# Patient Record
Sex: FEMALE | HISPANIC OR LATINO | Employment: FULL TIME | URBAN - METROPOLITAN AREA
[De-identification: names, ages, dates, MRNs, and addresses within clinical notes are randomized per-mention and may not be internally consistent; named-entity substitution may affect disease eponyms.]

---

## 2017-10-04 ENCOUNTER — TRANSFERRED RECORDS (OUTPATIENT)
Dept: HEALTH INFORMATION MANAGEMENT | Facility: CLINIC | Age: 32
End: 2017-10-04

## 2017-10-30 ENCOUNTER — TRANSFERRED RECORDS (OUTPATIENT)
Dept: HEALTH INFORMATION MANAGEMENT | Facility: CLINIC | Age: 32
End: 2017-10-30

## 2017-10-31 ENCOUNTER — TRANSFERRED RECORDS (OUTPATIENT)
Dept: HEALTH INFORMATION MANAGEMENT | Facility: CLINIC | Age: 32
End: 2017-10-31

## 2017-11-22 ENCOUNTER — PRENATAL OFFICE VISIT (OUTPATIENT)
Dept: OBGYN | Facility: CLINIC | Age: 32
End: 2017-11-22
Payer: COMMERCIAL

## 2017-11-22 ENCOUNTER — PRENATAL OFFICE VISIT (OUTPATIENT)
Dept: NURSING | Facility: CLINIC | Age: 32
End: 2017-11-22
Payer: COMMERCIAL

## 2017-11-22 VITALS
WEIGHT: 175.5 LBS | SYSTOLIC BLOOD PRESSURE: 122 MMHG | BODY MASS INDEX: 29.96 KG/M2 | HEART RATE: 62 BPM | HEIGHT: 64 IN | DIASTOLIC BLOOD PRESSURE: 72 MMHG | TEMPERATURE: 98 F

## 2017-11-22 DIAGNOSIS — N89.8 VAGINAL DISCHARGE: ICD-10-CM

## 2017-11-22 DIAGNOSIS — Z11.3 ROUTINE SCREENING FOR STI (SEXUALLY TRANSMITTED INFECTION): ICD-10-CM

## 2017-11-22 DIAGNOSIS — Z34.90 SUPERVISION OF NORMAL PREGNANCY: Primary | ICD-10-CM

## 2017-11-22 DIAGNOSIS — Z23 NEED FOR PROPHYLACTIC VACCINATION AND INOCULATION AGAINST INFLUENZA: ICD-10-CM

## 2017-11-22 DIAGNOSIS — Z33.3 PREGNANT STATE, GESTATIONAL CARRIER: Primary | ICD-10-CM

## 2017-11-22 LAB
ABO + RH BLD: NORMAL
ABO + RH BLD: NORMAL
ALBUMIN UR-MCNC: NEGATIVE MG/DL
APPEARANCE UR: CLEAR
BILIRUB UR QL STRIP: NEGATIVE
BLD GP AB SCN SERPL QL: NORMAL
BLOOD BANK CMNT PATIENT-IMP: NORMAL
COLOR UR AUTO: YELLOW
ERYTHROCYTE [DISTWIDTH] IN BLOOD BY AUTOMATED COUNT: 14.6 % (ref 10–15)
GLUCOSE UR STRIP-MCNC: NEGATIVE MG/DL
HCT VFR BLD AUTO: 37.7 % (ref 35–47)
HGB BLD-MCNC: 12.5 G/DL (ref 11.7–15.7)
HGB UR QL STRIP: NEGATIVE
KETONES UR STRIP-MCNC: NEGATIVE MG/DL
LEUKOCYTE ESTERASE UR QL STRIP: NEGATIVE
MCH RBC QN AUTO: 28.7 PG (ref 26.5–33)
MCHC RBC AUTO-ENTMCNC: 33.2 G/DL (ref 31.5–36.5)
MCV RBC AUTO: 87 FL (ref 78–100)
NITRATE UR QL: NEGATIVE
PH UR STRIP: 6.5 PH (ref 5–7)
PLATELET # BLD AUTO: 271 10E9/L (ref 150–450)
RBC # BLD AUTO: 4.35 10E12/L (ref 3.8–5.2)
SOURCE: NORMAL
SP GR UR STRIP: 1.02 (ref 1–1.03)
SPECIMEN EXP DATE BLD: NORMAL
SPECIMEN SOURCE: NORMAL
UROBILINOGEN UR STRIP-ACNC: 0.2 EU/DL (ref 0.2–1)
WBC # BLD AUTO: 7.9 10E9/L (ref 4–11)
WET PREP SPEC: NORMAL

## 2017-11-22 PROCEDURE — 99207 ZZC FIRST OB VISIT: CPT | Performed by: OBSTETRICS & GYNECOLOGY

## 2017-11-22 PROCEDURE — 86780 TREPONEMA PALLIDUM: CPT | Performed by: OBSTETRICS & GYNECOLOGY

## 2017-11-22 PROCEDURE — 90686 IIV4 VACC NO PRSV 0.5 ML IM: CPT | Performed by: OBSTETRICS & GYNECOLOGY

## 2017-11-22 PROCEDURE — 86900 BLOOD TYPING SEROLOGIC ABO: CPT | Performed by: OBSTETRICS & GYNECOLOGY

## 2017-11-22 PROCEDURE — 99207 ZZC NO CHARGE NURSE ONLY: CPT

## 2017-11-22 PROCEDURE — 87086 URINE CULTURE/COLONY COUNT: CPT | Performed by: OBSTETRICS & GYNECOLOGY

## 2017-11-22 PROCEDURE — 86901 BLOOD TYPING SEROLOGIC RH(D): CPT | Performed by: OBSTETRICS & GYNECOLOGY

## 2017-11-22 PROCEDURE — 87340 HEPATITIS B SURFACE AG IA: CPT | Performed by: OBSTETRICS & GYNECOLOGY

## 2017-11-22 PROCEDURE — 86850 RBC ANTIBODY SCREEN: CPT | Performed by: OBSTETRICS & GYNECOLOGY

## 2017-11-22 PROCEDURE — 87389 HIV-1 AG W/HIV-1&-2 AB AG IA: CPT | Performed by: OBSTETRICS & GYNECOLOGY

## 2017-11-22 PROCEDURE — 90471 IMMUNIZATION ADMIN: CPT | Performed by: OBSTETRICS & GYNECOLOGY

## 2017-11-22 PROCEDURE — 36415 COLL VENOUS BLD VENIPUNCTURE: CPT | Performed by: OBSTETRICS & GYNECOLOGY

## 2017-11-22 PROCEDURE — 87591 N.GONORRHOEAE DNA AMP PROB: CPT | Performed by: OBSTETRICS & GYNECOLOGY

## 2017-11-22 PROCEDURE — 87210 SMEAR WET MOUNT SALINE/INK: CPT | Performed by: OBSTETRICS & GYNECOLOGY

## 2017-11-22 PROCEDURE — 87491 CHLMYD TRACH DNA AMP PROBE: CPT | Performed by: OBSTETRICS & GYNECOLOGY

## 2017-11-22 PROCEDURE — 86762 RUBELLA ANTIBODY: CPT | Performed by: OBSTETRICS & GYNECOLOGY

## 2017-11-22 PROCEDURE — 81003 URINALYSIS AUTO W/O SCOPE: CPT | Performed by: OBSTETRICS & GYNECOLOGY

## 2017-11-22 PROCEDURE — 85027 COMPLETE CBC AUTOMATED: CPT | Performed by: OBSTETRICS & GYNECOLOGY

## 2017-11-22 RX ORDER — PRENATAL VIT/IRON FUM/FOLIC AC 27MG-0.8MG
1 TABLET ORAL DAILY
Status: ON HOLD | COMMUNITY
End: 2018-06-10

## 2017-11-22 NOTE — PROGRESS NOTES
OB - New OB History and Physical  Date of visit: 2017  Chief Complaint: To establish prenatal care    HPI: Ana Lilia Arrington is a 32 year old  at 11w0d as dated by frozen embryo transfer.  She is a gestational carrier for a couple in [place].  The egg is from the intended mother's sister who was in her early 30s at the time of egg retrieval.  Two frozen embryos were transferred, but only one has developed.    Since becoming pregnant, she has been doing well.  She's had some mild nausea, but no vomiting.  She has not had any cramping or bleeding since becoming pregnant.    Ultrasound: performed from [facility] prior to transfer to our clinic showed IUP with + FHT, and second gestational sac that did not develop.    Obstetric history:     Obstetric History       T3      L3     SAB0   TAB0   Ectopic0   Multiple0   Live Births3       # Outcome Date GA Lbr Mayank/2nd Weight Sex Delivery Anes PTL Lv   4 Current            3 Term 09/12/10 39w0d 06:00 8 lb 6 oz (3.799 kg) M    MICHELINE      Name: radha   2 Term 08 40w2d 03:00 7 lb (3.175 kg) F    MICHELINE      Name: Marcella   1 Term 06 40w0d 05:00 6 lb (2.722 kg) F    MICHELINE      Name: Estephanie        Patient denies history of gestational hypertension, pre-eclampsia, gestational diabetes,  labor.    Gynecologic History:   STI history: remote history of chlamydia  Last Pap: 2015  History of abnormal pap: none    Allergy: Eggs and No known drug allergies  Patient denies food, latex or environmental allergies except as above.    Current Medications:  Current Outpatient Prescriptions   Medication     PROGESTERONE MICRONIZED PO     Prenatal Vit-Fe Fumarate-FA (PRENATAL MULTIVITAMIN PLUS IRON) 27-0.8 MG TABS per tablet     No current facility-administered medications for this visit.        Past Medical History:  Past Medical History:   Diagnosis Date     NO ACTIVE PROBLEMS        Past Surgical History:  Past Surgical History:   Procedure  Laterality Date     NO HISTORY OF SURGERY         Social History:  Patient lives with  and children.  Patient's relationship status is: .    Denies current tobacco, alcohol or recreational drug use.  She feels safe in her relationship. Patient denies history of sexual, physical or mental abuse.     Family History:  Family History   Problem Relation Age of Onset     Family History Negative No family hx of        Review of Systems  Gen:  no change in weight, no fever, no chills, no fatigue  CV: no palpitations, no chest pain, no hypertension, no syncope  Resp: no shortness of breath, no cough, no wheezing, no asthma  GI: some nausea, no vomiting, no diarrhea, no constipation, no bloating, no GERD  :  no vaginal discharge, no dysuria, no abnormal bleeding, no pelvic pain   Endo: no thyroid problems, no cold/heat intolerance, no acne, no hirsutism, no diabetes  Heme: no easy bruising or bleeding, no history of DVT/PE/CVA  Neuro: no headaches, no seizures, no strokes, no focal deficits      Physical Exam:  There were no vitals filed for this visit.  There is no height or weight on file to calculate BMI.  Gen: alert, oriented, no distress, pleasant, appears stated age, well groomed  Neck: supple, trachea midline, no thyromegaly, no lymphadenopathy  HEENT: head normocephalic, atraumatic, normal oropharynx without erythema or exudates  CV: normal heart sounds, regular rate and rhythm, no murmurs  Resp: good inspiratory effort, lungs clear to ascultation bilaterally, no wheezes or rhonchi  Breast: no masses or nipple discharge  Abd: soft, nontender, nondistended, normoactive bowel sounds,  no masses or organomegaly, no hernias, no scars  : normal external genitalia with lesions or erythema; normal, well supported urethra, normal Bartholins, normal Skenes; normal pink rugated vaginal mucosa, no lesions or abnormal discharge; regular Nisreen speculum inserted without difficulty; normal appearing cervix  without lesions, bleeding or discharge. Bimanual exam shows 12week sized anteverted uterus, mobile, no fundal tenderness, no CMT, no adnexal masses or tenderness. Cervix WNL  Extr: warm, well perfused, nontender, no edema  Psych: affect bright, cooperative, responds appropriately      Assessment:  Ana Lilia Arrington is a 32 year old  at 11w0d presenting to establish prenatal care.    Problem List:   Gestational carrier  Nausea in early pregnancy    Plan:  1. Gestational carrier: intended parents in [place].  Patient will set up [employer] so she's able to share information/test results with intended parents.  2. Nausea in early pregnancy:  Taking B6 and unisom.  Rx sent for Zofran  3. Reviewed routine prenatal care. Discussed MD call schedule as well as role of residents and med students both in clinic and hospital.  She is okay  with resident care  4. Pap: UTD   5. Diet, Nutrition and Exercise:  Continue PNVs. Continue normal exercise. Her prepregnancy BMI is 28.  According to the WHO guidelines, patient is given a goal of gaining approximately 15-25 pounds during the course of her pregnancy.    6. Immunizations: plan TdaP at 28 weeks  7. Fetal anomaly screening: FTS scheduled, as it's in her gestational carrier contract  8. Routine Prenatal Care: the patient will return to clinic in 4 weeks and prn    Mraybel Monroe MD

## 2017-11-22 NOTE — PROGRESS NOTES

## 2017-11-22 NOTE — NURSING NOTE
"Chief Complaint   Patient presents with     Prenatal Care     new  teaching and labs       Initial /72  Pulse 62  Temp 98  F (36.7  C)  Ht 5' 4\" (1.626 m)  Wt 175 lb 8 oz (79.6 kg)  BMI 30.12 kg/m2 Estimated body mass index is 30.12 kg/(m^2) as calculated from the following:    Height as of this encounter: 5' 4\" (1.626 m).    Weight as of this encounter: 175 lb 8 oz (79.6 kg).  Medication Reconciliation: complete    "

## 2017-11-22 NOTE — PROGRESS NOTES
Patient presents for new ob teaching and labs, fourth pregnancy. This pregnancy she is a surrogate mother, [place] [facility] Last ultrasound was 10/30/17. Ordered first trimester screening, handouts reviewed and given. Complains of nausea, would like Rx. Has appointment today with Dr Monroe      Caffeine intake/servings daily - 0  Calcium intake/servings daily - 3  Exercise 5 times weekly - describe ; walks,   Sunscreen used - Yes  Seatbelts used - Yes  Guns stored in the home - No  Self Breast Exam - Yes  Pap test up to date -  Yes  Eye exam up to date -  Yes  Dental exam up to date -  No  DEXA scan up to date -  No  Flex Sig/Colonoscopy up to date -  No  Mammography up to date -  No  Immunizations reviewed and up to date - Yes  Abuse: Current or Past (Physical, Sexual or Emotional) - No  Do you feel safe in your environment - Yes  Do you cope well with stress - Yes  Do you suffer from insomnia - No    Prenatal OB Questionnaire  Past Medical History  Diabetes   No  Hypertension   No  Heart Disease, mitral valve prolapse, or rheumatic fever?   No  An autoimmune disorder such as Lupus or Rheumatoid Arthritis?   No  Kidney Disease or Urinary Tract Infection?   No  Epilepsy, seizures or spells?   No  Migraine headaches?   Yes  A stroke or loss of function or sensation?   No  Any other neurological problems?   No  Have you ever been treated for depression?  No  Are you having problems with crying spells or loss of self-esteem?   No  Have you ever required psychiatric care?   No  Have you ever hepatitis, liver disease or jaundice?   No  Have you ever been treated for blood clots in your veins, deep venous thrombosis, inflammation in the veins, thrombosis, phlebitis, pulmonary embolism or varicosities?   No  Have you had excessive bleeding after surgery or dental work?   No  Do you bleed more than other women after a cut or scratch?   No  Do you have a history of anemia?   No  Have you ever been treated  for thyroid problems or taken thyroid medication?  No  Do you have any other endocrine problems?  No  Have you ever been in a major accident or suffered serious trauma?   No  Within the last year, has anyone hit slapped, kicked or otherwise hurt you?  No  In the last year, has anyone forced you to have sex when you didn't want to?  No  Have you ever had a blood transfusion?   No  Would you refuse a blood transfusion if a doctor judged it to be medically necessary?   No  If you answered yes, would you rather die than have a blood transfusion?   No  If you answered yes, is this for Sabianist reasons?   No  Does anyone in your home smoke?   No  Do you use tobacco products?  No  Do you drink beer, wine, hard liquor?  No  Do you use any of the following: marijuana, speed, cocaine, heroine, hallucinogens, or other drugs?  No  Is your blood type Rh negative?   No  Have you ever had abnormal antibodies in your blood?   No  Have you ever had asthma?   No  Have you ever had tuberculosis?   No  Do you have any allergies to drugs or over-the-counter medications?   No    Allergies as of 11/22/2017:    Allergies as of 11/22/2017 - Nba as Reviewed 11/22/2017   Allergen Reaction Noted     Eggs  10/29/2010     No known drug allergies  08/06/2010       Do you have any breast problems?   No  Have you ever ?   No  Have you had any gynecological surgical procedures such as cervical conization, a LEEP procedure, laser treatment, cryosurgery of the cervix, or a dilation and curettage, etc?  No  Have you had any other surgical procedures?  No  Have you been hospitalized for a nonsurgical reason excluding normal delivery?   No  Have you ever had any anesthetic complications?   No  Have you ever had an abnormal pap smear?   No  Do you have a history of abnormalities of the uterus?   No  Did it take you more than one year to become pregnant?   No  Have you ever been evaluated or treated for infertility?   No  Is there a history of  medical problems in your family, which you feel might adversely affect your health or pregnancy?   No  Do you have any other problems we have not asked you about which you feel may be important to this pregnancy?  No    Symptoms since Last Menstrual Period  Do you have any of the following:    *abdominal pain  No  *blood in stool or urine  No  *chest pain  No  *shortness of breath  No  *coughing or vomiting up blood No  *heart racing or skipping beats  No  *nausea and vomiting  No  *pain with urination  No  *vaginal discharge or bleeding  No  Current medications are:  Current Outpatient Prescriptions   Medication Sig Dispense Refill     PROGESTERONE MICRONIZED PO Take 200 mg by mouth daily       Prenatal Vit-Fe Fumarate-FA (PRENATAL MULTIVITAMIN PLUS IRON) 27-0.8 MG TABS per tablet Take 1 tablet by mouth daily         Genetic Screening  At the time of birth, will you be 35 years old or older?  No  Has the patient, baby s father, or anyone in either family had:  Thalassemia (Italian, Greek, Mediterranean, or  background only) and an MCV result less than 80?  No  Neural tube defect such as meningomyelocele, spina bifida or anencephaly?  No  Congenital heart defect?  No  Down s syndrome?  No  Nicho-Sach s disease (Orthodox, Cajun, Uruguayan-[place])?  No  Sickle cell disease or trait (Shayy)?  No  Hemophilia or other inherited problems of blood coagulation? No  Muscular dystrophy?  No  Cystic Fibrosis?  No  Richland s chorea?  No  Mental retardation/autism? No   If yes, was the person tested for fragile X?  No  Any other inherited genetic or chromosomal disorder?  No  Maternal metabolic disorder (e.g. insulin-dependent diabetes, PKU)? No  A child with birth defects not listed above?  No  Recurrent pregnancy loss or a stillbirth?  No  Has the patient had any medications/street drugs/alcohol since her last menstrual period? No  Does the patient or baby s father have any other genetic risks?  No  Infection History  Do  you object to being tested for Hepatitis B? No  Do you object to being tested for HIV? No  Do you feel that you are at high risk for coming in contact with the AIDS virus?  No  Have you ever been treated for tuberculosis?  No  Have you ever received the BCG vaccine for tuberculosis?  No  Have you ever had a positive skin test for tuberculosis? No  Do you live with someone who has tuberculosis?  No  Have you ever been exposed to tuberculosis?  No  Do you have genital herpes?  No  Does your partner have genital herpes?  No  Have you had a rash or viral illness since your last period?  No  Have you ever had Gonorrhea, Chlamydia, Syphilis, venereal warts, trichomoniasis, pelvic inflammatory disease or any other sexually transmitted disease?  Yes  Do you know if you are a genital group B streptococcus carrier? No  You have not had chicken pox/varicella  Yes  Have you been vaccinated against chicken pox?  Yes  Have you had any other infectious disease? No        Early ultrasound screening tool:    Does patient have irregular periods?  No  Did patient use hormonal birth control in the three months prior to positive urine pregnancy test? No  Is the patient breastfeeding?  No  Is the patient 10 weeks or greater at time of education visit?  Yes

## 2017-11-22 NOTE — MR AVS SNAPSHOT
After Visit Summary   11/22/2017    Ana Lilia Arrington    MRN: [id number]           Patient Information     Date Of Birth          1985        Visit Information        Provider Department      11/22/2017 1:45 PM RD OB NURSE EDUCATION [facility]        Today's Diagnoses     Supervision of normal pregnancy    -  1       Follow-ups after your visit        Additional Services     MAT FETAL MED CTR REFERRAL-PREGNANCY       >> Patient may proceed with recommendations for further testing as directed by the Maternal Fetal Medicine Specialist >>    >> If requesting Fetal Echo: MFM will determine appropriate location for exam due to indication.    >> If requesting Lung Maturity Amnio:  If results indicate fetal lung maturity, induction or C/S is recommended within 36 hours.  Please schedule accordingly.     Dear Patient:   Please be aware that coverage of these services is subject to the terms and limitations of your health insurance plan.  Call member services at your health plan with any benefit or coverage questions.      Please bring the following to your appointment:    >>  Any x-rays, CTs or MRIs which have been performed.  Contact the facility where they were done to arrange for  prior to your scheduled appointment.  Any new CT, MRI or other procedures ordered by your specialist must be performed at a [facility] facility or coordinated by your clinic's referral office.  >>  List of current medications   >>  This referral request   >>  Any documents/labs given to you for this referral                  Your next 10 appointments already scheduled     Nov 22, 2017  2:30 PM CST   New Prenatal with Marybel Monroe MD   [facility]    [address] [place] [id number] [phone]              Who to contact     If you have questions or need follow up information about today's clinic visit or your schedule please  "contact [facility] directly at [phone].  Normal or non-critical lab and imaging results will be communicated to you by MyChart, letter or phone within 4 business days after the clinic has received the results. If you do not hear from us within 7 days, please contact the clinic through MyChart or phone. If you have a critical or abnormal lab result, we will notify you by phone as soon as possible.  Submit refill requests through [employer] or call your pharmacy and they will forward the refill request to us. Please allow 3 business days for your refill to be completed.          Additional Information About Your Visit        [facility] Information     [employer] lets you send messages to your doctor, view your test results, renew your prescriptions, schedule appointments and more. To sign up, go to [email or web address] . Click on \"Log in\" on the left side of the screen, which will take you to the Welcome page. Then click on \"Sign up Now\" on the right side of the page.     You will be asked to enter the access code listed below, as well as some personal information. Please follow the directions to create your username and password.     Your access code is: [email or web address]  Expires: 2018  2:21 PM     Your access code will  in 90 days. If you need help or a new code, please call your [facility] clinic or [phone].        Care EveryWhere ID     This is your Care EveryWhere ID. This could be used by other organizations to access your [facility] medical records  [id number]        Your Vitals Were     Pulse Temperature Height BMI (Body Mass Index)          62 98  F (36.7  C) 5' 4\" (1.626 m) 30.12 kg/m2         Blood Pressure from Last 3 Encounters:   17 122/72   16 133/74   11/16/15 134/70    Weight from Last 3 Encounters:   17 175 lb 8 oz (79.6 kg)   16 137 lb 6.4 oz (62.3 kg)   11/16/15 127 lb 3.2 oz (57.7 kg)              We Performed the Following     ABO/RH Type and " Screen     Anti Treponema     CBC with Platelets     Hepatitis B surface antigen     HIV Antigen Antibody Combo     MAT FETAL MED CTR REFERRAL-PREGNANCY     Rubella Antibody IgG Quantitative     UA without Microscopic     Urine Culture Aerobic Bacterial        Primary Care Provider Office Phone # Fax SONU Reece [phone]       [address]  [facility] [address]        Equal Access to Services     [facility] Hospital[facility] : Hadii aad ku hadasho Soomaali, waaxda luqadaha, qaybta kaalmada adeegyada, waxay idiin hayaan adeeg kharash la'aan . So [facility] [phone].    ATENCIÓN: Si habla español, tiene a alberto disposición servicios gratuitos de asistencia lingüística. Ernesto al [phone].    We comply with applicable federal civil rights laws and [place] laws. We do not discriminate on the basis of race, color, national origin, age, disability, sex, sexual orientation, or gender identity.            Thank you!     Thank you for choosing [facility]  for your care. Our goal is always to provide you with excellent care. Hearing back from our patients is one way we can continue to improve our services. Please take a few minutes to complete the written survey that you may receive in the mail after your visit with us. Thank you!             Your Updated Medication List - Protect others around you: Learn how to safely use, store and throw away your medicines at www.disposemymeds.org.          This list is accurate as of: 11/22/17  2:21 PM.  Always use your most recent med list.                   Brand Name Dispense Instructions for use Diagnosis    prenatal multivitamin plus iron 27-0.8 MG Tabs per tablet      Take 1 tablet by mouth daily        PROGESTERONE MICRONIZED PO      Take 200 mg by mouth daily

## 2017-11-22 NOTE — MR AVS SNAPSHOT
After Visit Summary   11/22/2017    Ana Lilia Arrington    MRN: [id number]           Patient Information     Date Of Birth          1985        Visit Information        Provider Department      11/22/2017 2:30 PM Marybel Monroe MD [facility]        Today's Diagnoses     Pregnant state, gestational carrier    -  1    Need for prophylactic vaccination and inoculation against influenza        Vaginal discharge        Routine screening for STI (sexually transmitted infection)           Follow-ups after your visit        Additional Services     MAT FETAL MED CTR REFERRAL-PREGNANCY       >> Patient may proceed with recommendations for further testing as directed by the Maternal Fetal Medicine Specialist >>    >> If requesting Fetal Echo: MFM will determine appropriate location for exam due to indication.    >> If requesting Lung Maturity Amnio:  If results indicate fetal lung maturity, induction or C/S is recommended within 36 hours.  Please schedule accordingly.     Dear Patient:   Please be aware that coverage of these services is subject to the terms and limitations of your health insurance plan.  Call member services at your health plan with any benefit or coverage questions.      Please bring the following to your appointment:    >>  Any x-rays, CTs or MRIs which have been performed.  Contact the facility where they were done to arrange for  prior to your scheduled appointment.  Any new CT, MRI or other procedures ordered by your specialist must be performed at a [place] facility or coordinated by your clinic's referral office.  >>  List of current medications   >>  This referral request   >>  Any documents/labs given to you for this referral                  Your next 10 appointments already scheduled     Nov 30, 2017  2:15 PM CST   Genetic Counseling with SH GEN COUNSELOR 1   [facility] Maternal Fetal Medicine [facility] ([facility])    [address] [facility]  "[address] [address]   [phone]            Nov 30, 2017  3:00 PM CST   MFM NUCHAL TRANSLUCENCY with SHMFMUSR3   [facility] Maternal Fetal Medicine Ultrasound - [facility] ([facility])    [address] [place] [address] [address]  [facility] [address]   [phone]            Nov 30, 2017  3:30 PM CST   Radiology MD with SH MFM MD   [facility] Maternal Fetal Medicine [facility] ([facility])    [address] [place] [address] [address]  [facility] [address]   [phone]           Please arrive at the time given for your first appointment. This visit is used internally to schedule the physician's time during your ultrasound.            Dec 20, 2017  1:30 PM CST   ESTABLISHED PRENATAL with Marybel Monroe MD   [facility]    [address] [address]  [facility] [address] [phone]              Who to contact     If you have questions or need follow up information about today's clinic visit or your schedule please contact [facility] directly at [phone].  Normal or non-critical lab and imaging results will be communicated to you by MyChart, letter or phone within 4 business days after the clinic has received the results. If you do not hear from us within 7 days, please contact the clinic through Local Motors[employer] or phone. If you have a critical or abnormal lab result, we will notify you by phone as soon as possible.  Submit refill requests through Yodle or call your pharmacy and they will forward the refill request to us. Please allow 3 business days for your refill to be completed.          Additional Information About Your Visit        Yodle Information     Yodle lets you send messages to your doctor, view your test results, renew your prescriptions, schedule appointments and more. To sign up, go to [email or web address] . Click on \"Log in\" on the left side of the screen, which will take you to " "the Welcome page. Then click on \"Sign up Now\" on the right side of the page.     You will be asked to enter the access code listed below, as well as some personal information. Please follow the directions to create your username and password.     Your access code is: [email or web address]  Expires: 2018  3:45 PM     Your access code will  in 90 days. If you need help or a new code, please call your [place] clinic or [phone].        Care EveryWhere ID     This is your Care EveryWhere ID. This could be used by other organizations to access your [place] medical records  [id number]         Blood Pressure from Last 3 Encounters:   17 122/72   16 133/74   11/16/15 134/70    Weight from Last 3 Encounters:   17 175 lb 8 oz (79.6 kg)   16 137 lb 6.4 oz (62.3 kg)   11/16/15 127 lb 3.2 oz (57.7 kg)              We Performed the Following     CHLAMYDIA TRACHOMATIS PCR     FLU VAC, SPLIT VIRUS IM > 3 YO (QUADRIVALENT) [67757]     MAT FETAL MED CTR REFERRAL-PREGNANCY     NEISSERIA GONORRHOEA PCR     Vaccine Administration, Initial [13041]     Wet prep        Primary Care Provider Office Phone # Fax #    SONU Ortiz [facility] [phone]       [address]  [facility] [address]        Equal Access to Services     MISHA ALAN : Hadii aad ku hadasho Soomaali, waaxda luqadaha, qaybta kaalmada adeegyada, duane hewitt . So [facility] [phone].    ATENCIÓN: Si habla español, tiene a alberto disposición servicios gratuitos de asistencia lingüística. Cadename al [phone].    We comply with applicable federal civil rights laws and [place] laws. We do not discriminate on the basis of race, color, national origin, age, disability, sex, sexual orientation, or gender identity.            Thank you!     Thank you for choosing [facility]  for your care. Our goal is always to provide you with excellent care. Hearing back from our patients is " one way we can continue to improve our services. Please take a few minutes to complete the written survey that you may receive in the mail after your visit with us. Thank you!             Your Updated Medication List - Protect others around you: Learn how to safely use, store and throw away your medicines at www.disposemymeds.org.          This list is accurate as of: 11/22/17 11:59 PM.  Always use your most recent med list.                   Brand Name Dispense Instructions for use Diagnosis    prenatal multivitamin plus iron 27-0.8 MG Tabs per tablet      Take 1 tablet by mouth daily        PROGESTERONE MICRONIZED PO      Take 200 mg by mouth daily

## 2017-11-23 LAB
BACTERIA SPEC CULT: NORMAL
SPECIMEN SOURCE: NORMAL

## 2017-11-24 LAB
C TRACH DNA SPEC QL NAA+PROBE: NEGATIVE
HBV SURFACE AG SERPL QL IA: NONREACTIVE
HIV 1+2 AB+HIV1 P24 AG SERPL QL IA: NONREACTIVE
N GONORRHOEA DNA SPEC QL NAA+PROBE: NEGATIVE
RUBV IGG SERPL IA-ACNC: 199 IU/ML
SPECIMEN SOURCE: NORMAL
SPECIMEN SOURCE: NORMAL
T PALLIDUM IGG+IGM SER QL: NEGATIVE

## 2017-11-27 ENCOUNTER — PRE VISIT (OUTPATIENT)
Dept: MATERNAL FETAL MEDICINE | Facility: CLINIC | Age: 32
End: 2017-11-27

## 2017-11-27 PROBLEM — Z33.3 PREGNANT STATE, GESTATIONAL CARRIER: Status: ACTIVE | Noted: 2017-11-27

## 2017-11-30 ENCOUNTER — HOSPITAL ENCOUNTER (OUTPATIENT)
Dept: LAB | Facility: CLINIC | Age: 32
End: 2017-11-30
Attending: OBSTETRICS & GYNECOLOGY
Payer: COMMERCIAL

## 2017-11-30 ENCOUNTER — OFFICE VISIT (OUTPATIENT)
Dept: MATERNAL FETAL MEDICINE | Facility: CLINIC | Age: 32
End: 2017-11-30
Attending: OBSTETRICS & GYNECOLOGY
Payer: COMMERCIAL

## 2017-11-30 ENCOUNTER — HOSPITAL ENCOUNTER (OUTPATIENT)
Dept: ULTRASOUND IMAGING | Facility: CLINIC | Age: 32
Discharge: HOME | End: 2017-11-30
Attending: OBSTETRICS & GYNECOLOGY | Admitting: OBSTETRICS & GYNECOLOGY
Payer: COMMERCIAL

## 2017-11-30 DIAGNOSIS — Z36.9 FIRST TRIMESTER SCREENING: ICD-10-CM

## 2017-11-30 DIAGNOSIS — O09.811 PREGNANCY RESULTING FROM ASSISTED REPRODUCTIVE TECHNOLOGY IN FIRST TRIMESTER: Primary | ICD-10-CM

## 2017-11-30 DIAGNOSIS — Z36.9 FIRST TRIMESTER SCREENING: Primary | ICD-10-CM

## 2017-11-30 DIAGNOSIS — Z36.82 ENCOUNTER FOR (NT) NUCHAL TRANSLUCENCY SCAN: ICD-10-CM

## 2017-11-30 DIAGNOSIS — O26.90 PREGNANCY RELATED CONDITION, ANTEPARTUM: ICD-10-CM

## 2017-11-30 PROCEDURE — 84704 HCG FREE BETACHAIN TEST: CPT | Performed by: OBSTETRICS & GYNECOLOGY

## 2017-11-30 PROCEDURE — 76813 OB US NUCHAL MEAS 1 GEST: CPT

## 2017-11-30 PROCEDURE — 96040 ZZH GENETIC COUNSELING, EACH 30 MINUTES: CPT | Mod: ZF | Performed by: GENETIC COUNSELOR, MS

## 2017-11-30 PROCEDURE — 84163 PAPPA SERUM: CPT | Performed by: OBSTETRICS & GYNECOLOGY

## 2017-11-30 PROCEDURE — 36415 COLL VENOUS BLD VENIPUNCTURE: CPT | Performed by: OBSTETRICS & GYNECOLOGY

## 2017-11-30 NOTE — MR AVS SNAPSHOT
After Visit Summary   11/30/2017    Ana Lilia Arrington    MRN: [id number]           Patient Information     Date Of Birth          1985        Visit Information        Provider Department      11/30/2017 3:30 PM Pat Marshall DO [facility] Maternal Fetal Medicine [facility]        Today's Diagnoses     Pregnancy resulting from assisted reproductive technology in first trimester    -  1    Encounter for (NT) nuchal translucency scan           Follow-ups after your visit        Your next 10 appointments already scheduled     Dec 20, 2017  1:30 PM CST   ESTABLISHED PRENATAL with Marybel Monroe MD   [facility] ([facility])    [address]  [address]  [facility] [address]   [phone]            Jan 12, 2018 10:15 AM CST   (Arrive by 10:00 AM)   MFM US COMP with URMFMUSR3   [facility] Maternal Fetal Medicine Ultrasound - [facility])    [address]  [facility] [id number] [phone]           Wear comfortable clothes and leave your valuables at home.            Jan 12, 2018 10:45 AM CST   Radiology MD with UR MARGARET WALTON   [facility] Maternal Fetal Medicine - [facility])    [address]  [facility] [address]   [phone]           Please arrive at the time given for your first appointment. This visit is used internally to schedule the physician's time during your ultrasound.              Future tests that were ordered for you today     Open Future Orders        Priority Expected Expires Ordered    MFM US Comprehensive Single Routine 1/11/2018 9/30/2018 11/30/2017    First Trimester Screen Biochem Markers Routine  11/30/2018 11/30/2017            Who to contact     If you have questions or need follow up information about today's clinic visit or your schedule please contact [facility] MATERNAL FETAL MEDICINE [facility] directly at  [phone].  Normal or non-critical lab and imaging results will be communicated to you by MyChart, letter or phone within 4 business days after the clinic has received the results. If you do not hear from us within 7 days, please contact the clinic through MEDOPhart or phone. If you have a critical or abnormal lab result, we will notify you by phone as soon as possible.  Submit refill requests through BotanoCap or call your pharmacy and they will forward the refill request to us. Please allow 3 business days for your refill to be completed.          Additional Information About Your Visit        MEDOPhart Information     BotanoCap gives you secure access to your electronic health record. If you see a primary care provider, you can also send messages to your care team and make appointments. If you have questions, please call your primary care clinic.  If you do not have a primary care provider, please call [phone] and they will assist you.        Care EveryWhere ID     This is your Care EveryWhere ID. This could be used by other organizations to access your [place] medical records  [id number]         Blood Pressure from Last 3 Encounters:   11/22/17 122/72   04/13/16 133/74   11/16/15 134/70    Weight from Last 3 Encounters:   11/22/17 79.6 kg (175 lb 8 oz)   04/13/16 62.3 kg (137 lb 6.4 oz)   11/16/15 57.7 kg (127 lb 3.2 oz)               Primary Care Provider Office Phone # Fax #    Gill SONU Perez [facility] [phone]       [address] [facility] [address]  [facility] [address]        Equal Access to Services     [facility]: Hadii aad ku hadasho Soomaali, waaxda luqadaha, qaybta kaalmada adeegyanellie, duane hewitt . So [facility] [phone].    ATENCIÓN: Si habla español, tiene a alberto disposición servicios gratuitos de asistencia lingüística. Llame al [phone].    We comply with applicable federal civil rights laws and [place] laws. We do not discriminate on the basis of  race, color, national origin, age, disability, sex, sexual orientation, or gender identity.            Thank you!     Thank you for choosing [facility]  for your care. Our goal is always to provide you with excellent care. Hearing back from our patients is one way we can continue to improve our services. Please take a few minutes to complete the written survey that you may receive in the mail after your visit with us. Thank you!             Your Updated Medication List - Protect others around you: Learn how to safely use, store and throw away your medicines at www.disposemymeds.org.          This list is accurate as of: 11/30/17 11:59 PM.  Always use your most recent med list.                   Brand Name Dispense Instructions for use Diagnosis    prenatal multivitamin plus iron 27-0.8 MG Tabs per tablet      Take 1 tablet by mouth daily        PROGESTERONE MICRONIZED PO      Take 200 mg by mouth daily

## 2017-11-30 NOTE — PROGRESS NOTES
[facility] [place]  Genetic Counseling Consult    Patient: Ana Lilia Arrington YOB: 1985   Date of Service: 17      Ana Lilia Arrington was seen at [facility] for genetic consultation to discuss the options for routine screening for fetal chromosome abnormalities.       Impression/Plan:   1.  Ana Lilia had an ultrasound and blood draw for first trimester screening.  Results are expected within 3-5 days, and will be available in [facility].  We will contact her to discuss the results, and a copy will be forwarded to the office of the referring OB provider.    2. Maternal serum AFP (single marker screen) is recommended after 15 weeks to screen for open neural tube defects. A quad screen should not be performed.    Pregnancy History:   /Parity:    Age at Delivery: 33 year old  SANDY: 2018, by Ultrasound  Gestational Age: 12w1d    No significant complications or exposures were reported in the current pregnancy.    As you know, Ana Lilia is a gestational carrier for a couple in [place]. The egg was donated by the intended mother's sister. Per Ana Lilia,the donor was 27 years old at the time of retrieval The intended father is the sperm donor. Ana Lilia reported that this was a frozen embryo transfer on 2017. Two embryos were transferred and a osborn pregnancy was diagnosed by early ultrasound.       Family History:   No family history was available for the intended father or the egg donor.          Risk Assessment for Chromosome Conditions:   We explained that the risk for fetal chromosome abnormalities increases with maternal age. We discussed specific features of common chromosome abnormalities, including Down syndrome, trisomy 13, trisomy 18, and sex chromosome trisomies.      At age 27 (age of the egg donor), the risk to have a baby with Down syndrome is 1 in 1111.     At age 27 (age of the egg donor), the risk to have a baby with any  chromosome abnormality is 1 in 455.          Testing Options:   We discussed the following options:   First trimester screening    First trimester ultrasound with nuchal translucency and nasal bone assessments, maternal plasma hCG, JULIETA-A, and AFP measurement    Screens for fetal trisomy 21, trisomy 13, and trisomy 18    Cannot screen for open neural tube defects; maternal serum AFP after 15 weeks is recommended     Non-invasive Prenatal Testing (NIPT)    Maternal plasma cell-free DNA testing; first trimester ultrasound with nuchal translucency and nasal bone assessment is recommended, when appropriate    Screens for fetal trisomy 21, trisomy 13, trisomy 18, and sex chromosome aneuploidy    Cannot screen for open neural tube defects; maternal serum AFP after 15 weeks is recommended     Genetic Amniocentesis    Invasive procedure typically performed in the second trimester by which amniotic fluid is obtained for the purpose of chromosome analysis and/or other prenatal genetic analysis    Diagnostic results; >99% sensitivity for fetal chromosome abnormalities    AFAFP measurement tests for open neural tube defects     Comprehensive (Level II) ultrasound: Detailed ultrasound performed between 18-22 weeks gestation to screen for major birth defects and markers for aneuploidy.      We reviewed the benefits and limitations of this testing.  Screening tests provide a risk assessment specific to the pregnancy for certain fetal chromosome abnormalities, but cannot definitively diagnose or exclude a fetal chromosome abnormality.  Follow-up genetic counseling and consideration of diagnostic testing is recommended with any abnormal screening result.      It was a pleasure to be involved with Iris s care. Face-to-face time of the meeting was 25 minutes.    Melinda Sutton MS, [facility]  Maternal Fetal Medicine  [facility]  Phone:[phone]  Email: [email or web address]

## 2017-11-30 NOTE — MR AVS SNAPSHOT
After Visit Summary   11/30/2017    Ana Lilia Arrington    MRN: [id number]           Patient Information     Date Of Birth          1985        Visit Information        Provider Department      11/30/2017 2:15 PM Melinda Sutton GC [facility] Maternal Fetal Medicine  [place]        Today's Diagnoses     First trimester screening    -  1    Pregnancy related condition, antepartum           Follow-ups after your visit        Your next 10 appointments already scheduled     Dec 20, 2017  1:30 PM CST   ESTABLISHED PRENATAL with Marybel Monroe MD   [facility] ([facility])    [address]  [address]  [facility] [address]   [phone]            Jan 12, 2018 10:15 AM CST   (Arrive by 10:00 AM)   MFM US COMP with [id number]   [facility] Maternal Fetal Medicine Ultrasound - [facility])    [address]  [facility] [address] [phone]           Wear comfortable clothes and leave your valuables at home.            Jan 12, 2018 10:45 AM CST   Radiology MD with UR MARGARET WALTON   [facility] Maternal Fetal Medicine - [facility])    [address]  [facility] [address]   [phone]           Please arrive at the time given for your first appointment. This visit is used internally to schedule the physician's time during your ultrasound.              Future tests that were ordered for you today     Open Future Orders        Priority Expected Expires Ordered    MFM US Comprehensive Single Routine 1/11/2018 9/30/2018 11/30/2017    First Trimester Screen Biochem Markers Routine  11/30/2018 11/30/2017            Who to contact     If you have questions or need follow up information about today's clinic visit or your schedule please contact [facility] MATERNAL FETAL MEDICINE [facility] directly at [phone].  Normal or non-critical lab and imaging results will be  communicated to you by Sierra Photonicshart, letter or phone within 4 business days after the clinic has received the results. If you do not hear from us within 7 days, please contact the clinic through APR E[employer] or phone. If you have a critical or abnormal lab result, we will notify you by phone as soon as possible.  Submit refill requests through dotSyntax or call your pharmacy and they will forward the refill request to us. Please allow 3 business days for your refill to be completed.          Additional Information About Your Visit        [employer] Information     dotSyntax gives you secure access to your electronic health record. If you see a primary care provider, you can also send messages to your care team and make appointments. If you have questions, please call your primary care clinic.  If you do not have a primary care provider, please call [phone] and they will assist you.        Care EveryWhere ID     This is your Care EveryWhere ID. This could be used by other organizations to access your [place] medical records  [id number]         Blood Pressure from Last 3 Encounters:   11/22/17 122/72   04/13/16 133/74   11/16/15 134/70    Weight from Last 3 Encounters:   11/22/17 79.6 kg (175 lb 8 oz)   04/13/16 62.3 kg (137 lb 6.4 oz)   11/16/15 57.7 kg (127 lb 3.2 oz)              We Performed the Following     [facility] Genetic Counseling        Primary Care Provider Office Phone # Fax #    Gill SONU Perez [facility] [phone]       [address] [facility] [address]  [facility] [address]        Equal Access to Services     MISHA ALAN : Hadii aad ku hadasho Soomaali, waaxda luqadaha, qaybta kaalmada adeegyada, duane hewitt . So [facility] [phone].    ATENCIÓN: Si habla español, tiene a alberto disposición servicios gratuitos de asistencia lingüística. Llame al [phone].    We comply with applicable federal civil rights laws and [place] laws. We do not discriminate on the basis of race, color,  national origin, age, disability, sex, sexual orientation, or gender identity.            Thank you!     Thank you for choosing [facility]  for your care. Our goal is always to provide you with excellent care. Hearing back from our patients is one way we can continue to improve our services. Please take a few minutes to complete the written survey that you may receive in the mail after your visit with us. Thank you!             Your Updated Medication List - Protect others around you: Learn how to safely use, store and throw away your medicines at www.disposemymeds.org.          This list is accurate as of: 11/30/17  4:34 PM.  Always use your most recent med list.                   Brand Name Dispense Instructions for use Diagnosis    prenatal multivitamin plus iron 27-0.8 MG Tabs per tablet      Take 1 tablet by mouth daily        PROGESTERONE MICRONIZED PO      Take 200 mg by mouth daily

## 2017-12-01 NOTE — PROGRESS NOTES
"Please see \"Imaging\" tab under \"Chart Review\" for details of today's US.    Pat Marshall, DO    "

## 2017-12-04 ENCOUNTER — TELEPHONE (OUTPATIENT)
Dept: MATERNAL FETAL MEDICINE | Facility: CLINIC | Age: 32
End: 2017-12-04

## 2017-12-04 NOTE — TELEPHONE ENCOUNTER
Called Iris and discussed normal first trimester screen resutls. These results indicated a 1 in >10,000 risk for Down syndrome and a 1 in >10,000 risk for trisomy 18/13. This screening test gives information about the risk of some chromosome conditions in a pregnancy, but does not definitively diagnose or exclude the presence of these chromosome conditions.  A copy of these results are available in her EPIC chart for her primary OB to review. Maternal serum AFP only is recommended in the second trimester to screen for neural tube defects.    Melinda Sutton MS, [facility]  Maternal Fetal Medicine  [phone]

## 2017-12-05 LAB — LAB SCANNED RESULT: NORMAL

## 2017-12-20 ENCOUNTER — PRENATAL OFFICE VISIT (OUTPATIENT)
Dept: OBGYN | Facility: CLINIC | Age: 32
End: 2017-12-20
Payer: COMMERCIAL

## 2017-12-20 VITALS
HEART RATE: 65 BPM | WEIGHT: 178 LBS | DIASTOLIC BLOOD PRESSURE: 65 MMHG | TEMPERATURE: 97.1 F | BODY MASS INDEX: 30.55 KG/M2 | SYSTOLIC BLOOD PRESSURE: 115 MMHG

## 2017-12-20 DIAGNOSIS — Z33.3 PREGNANT STATE, GESTATIONAL CARRIER: Primary | ICD-10-CM

## 2017-12-20 PROCEDURE — 99207 ZZC PRENATAL VISIT: CPT | Performed by: OBSTETRICS & GYNECOLOGY

## 2017-12-20 NOTE — MR AVS SNAPSHOT
After Visit Summary   12/20/2017    Ana Lilia Arrington    MRN: [id number]           Patient Information     Date Of Birth          1985        Visit Information        Provider Department      12/20/2017 1:30 PM Marybel Monroe MD [facility]        Today's Diagnoses     Pregnant state, gestational carrier    -  1       Follow-ups after your visit        Your next 10 appointments already scheduled     Jan 12, 2018 10:15 AM CST   (Arrive by 10:00 AM)   MARGARET US COMP with URMFMUSR3   [facility] - [facility])    [address]  [facility] [id number] [phone]           Wear comfortable clothes and leave your valuables at home.            Jan 12, 2018 10:45 AM CST   Radiology MD with UR MARGARET WALTON   [facility] - [facility])    [address]  [facility] [address] [phone]           Please arrive at the time given for your first appointment. This visit is used internally to schedule the physician's time during your ultrasound.            Jan 12, 2018 11:30 AM CST   LAB with RD LAB   [facility] ([facility])    [address] [place] [address]  [facility] [address] [phone]           Please do not eat 10-12 hours before your appointment if you are coming in fasting for labs on lipids, cholesterol, or glucose (sugar). This does not apply to pregnant women. Water, hot tea and black coffee (with nothing added) are okay. Do not drink other fluids, diet soda or chew gum.            Jan 19, 2018  1:30 PM CST   ESTABLISHED PRENATAL with Marybel Monroe MD   [facility] ([facility])    [address] [place] [address]  [facility] [id number] [phone]              Future tests that were ordered for you today     Open Future Orders        Priority  Expected Expires Ordered    Alpha fetoprotein maternal screen Routine 1/12/2018 12/20/2018 12/20/2017            Who to contact     If you have questions or need follow up information about today's clinic visit or your schedule please contact [facility] directly at [phone].  Normal or non-critical lab and imaging results will be communicated to you by MyChart, letter or phone within 4 business days after the clinic has received the results. If you do not hear from us within 7 days, please contact the clinic through [employer] or phone. If you have a critical or abnormal lab result, we will notify you by phone as soon as possible.  Submit refill requests through Rypos or call your pharmacy and they will forward the refill request to us. Please allow 3 business days for your refill to be completed.          Additional Information About Your Visit        MyChart Information     Rypos gives you secure access to your electronic health record. If you see a primary care provider, you can also send messages to your care team and make appointments. If you have questions, please call your primary care clinic.  If you do not have a primary care provider, please call [phone] and they will assist you.        Care EveryWhere ID     This is your Care EveryWhere ID. This could be used by other organizations to access your [place] medical records  [id number]        Your Vitals Were     Pulse Temperature BMI (Body Mass Index)             65 97.1  F (36.2  C) (Oral) 30.55 kg/m2          Blood Pressure from Last 3 Encounters:   12/20/17 115/65   11/22/17 122/72   04/13/16 133/74    Weight from Last 3 Encounters:   12/20/17 178 lb (80.7 kg)   11/22/17 175 lb 8 oz (79.6 kg)   04/13/16 137 lb 6.4 oz (62.3 kg)               Primary Care Provider Office Phone # Fax #    SONU Ortiz [facility] [phone]       [address] [address]        Equal Access to Services      MISHA ALAN : Hadii aad ku natalie Worrell, watressada luqadaha, qaybta kaalmada kwakustivennellie, duane gardnergregkayla munoz. So [facility] [phone].    ATENCIÓN: Si habla español, tiene a alberto disposición servicios gratuitos de asistencia lingüística. Llame al [phone].    We comply with applicable federal civil rights laws and [place] laws. We do not discriminate on the basis of race, color, national origin, age, disability, sex, sexual orientation, or gender identity.            Thank you!     Thank you for choosing [facility]  for your care. Our goal is always to provide you with excellent care. Hearing back from our patients is one way we can continue to improve our services. Please take a few minutes to complete the written survey that you may receive in the mail after your visit with us. Thank you!             Your Updated Medication List - Protect others around you: Learn how to safely use, store and throw away your medicines at www.disposemymeds.org.          This list is accurate as of: 12/20/17 11:59 PM.  Always use your most recent med list.                   Brand Name Dispense Instructions for use Diagnosis    prenatal multivitamin plus iron 27-0.8 MG Tabs per tablet      Take 1 tablet by mouth daily        PROGESTERONE MICRONIZED PO      Take 200 mg by mouth daily

## 2017-12-21 NOTE — PROGRESS NOTES
Doing well.  No problems since last visit.  Normal first trimester screen.  No cramping or vaginal bleeding, but has some round ligament pain.  Not really feeling movement yet.  Has FAS scheduled.  Will have AFP drawn that day.  RTC 4 weeks.    Marybel Monroe MD

## 2018-01-12 ENCOUNTER — HOSPITAL ENCOUNTER (OUTPATIENT)
Dept: ULTRASOUND IMAGING | Facility: CLINIC | Age: 33
Discharge: HOME | End: 2018-01-12
Attending: OBSTETRICS & GYNECOLOGY | Admitting: OBSTETRICS & GYNECOLOGY
Payer: COMMERCIAL

## 2018-01-12 ENCOUNTER — OFFICE VISIT (OUTPATIENT)
Dept: MATERNAL FETAL MEDICINE | Facility: CLINIC | Age: 33
End: 2018-01-12
Attending: OBSTETRICS & GYNECOLOGY
Payer: COMMERCIAL

## 2018-01-12 DIAGNOSIS — Z36.3 SCREENING, ANTENATAL, FOR MALFORMATION BY ULTRASOUND: ICD-10-CM

## 2018-01-12 DIAGNOSIS — O09.819 PREGNANCY RESULTING FROM IN VITRO FERTILIZATION, ANTEPARTUM: Primary | ICD-10-CM

## 2018-01-12 DIAGNOSIS — Z33.3 PREGNANT STATE, GESTATIONAL CARRIER: ICD-10-CM

## 2018-01-12 DIAGNOSIS — O09.811 PREGNANCY RESULTING FROM ASSISTED REPRODUCTIVE TECHNOLOGY IN FIRST TRIMESTER: ICD-10-CM

## 2018-01-12 PROCEDURE — 99000 SPECIMEN HANDLING OFFICE-LAB: CPT | Performed by: OBSTETRICS & GYNECOLOGY

## 2018-01-12 PROCEDURE — 82105 ALPHA-FETOPROTEIN SERUM: CPT | Mod: 90 | Performed by: OBSTETRICS & GYNECOLOGY

## 2018-01-12 PROCEDURE — 76811 OB US DETAILED SNGL FETUS: CPT

## 2018-01-12 PROCEDURE — 36415 COLL VENOUS BLD VENIPUNCTURE: CPT | Performed by: OBSTETRICS & GYNECOLOGY

## 2018-01-12 NOTE — MR AVS SNAPSHOT
After Visit Summary   2018    Ana Lilia Arrington    MRN: [id number]           Patient Information     Date Of Birth          1985        Visit Information        Provider Department      2018 10:45 AM Juan Lawton MD [facility] Maternal Fetal Medicine [facility] [place]        Today's Diagnoses     Pregnancy resulting from in vitro fertilization, antepartum    -  1    Screening, , for malformation by ultrasound           Follow-ups after your visit        Your next 10 appointments already scheduled     2018  1:30 PM CST   ESTABLISHED PRENATAL with Marybel Monroe MD   [facility] ([facility])    [address] [facility] 700  [facility] [address]   [phone]            2018 11:00 AM CST   Ech Fetal Complete* with URFETR1   [facility] Echo/EKG ([facility])    [address]  [facility] [address]               2018  1:30 PM CST   (Arrive by 1:15 PM)   MFM US COMPRE SINGLE F/U with URMFMUSR3   [facility] Maternal Fetal Medicine Ultrasound - [facility])    [address]  [facility] [id number] [phone]           Wear comfortable clothes and leave your valuables at home.            2018  2:00 PM CST   Radiology MD with UR MARGARET WALTON   [facility] Maternal Fetal Medicine - [facility])    [address]  [facility] [address]   [phone]           Please arrive at the time given for your first appointment. This visit is used internally to schedule the physician's time during your ultrasound.              Future tests that were ordered for you today     Open Future Orders        Priority Expected Expires Ordered    Echo Fetal Complete-Peds Cardiology Routine 2018    MFM US Comprehensive Single F/U Routine 2018            Who to contact      If you have questions or need follow up information about today's clinic visit or your schedule please contact [facility] MATERNAL FETAL MEDICINE [facility] directly at [phone].  Normal or non-critical lab and imaging results will be communicated to you by MyChart, letter or phone within 4 business days after the clinic has received the results. If you do not hear from us within 7 days, please contact the clinic through XOJEThart or phone. If you have a critical or abnormal lab result, we will notify you by phone as soon as possible.  Submit refill requests through Rapidlea or call your pharmacy and they will forward the refill request to us. Please allow 3 business days for your refill to be completed.          Additional Information About Your Visit        Rapidlea Information     Rapidlea gives you secure access to your electronic health record. If you see a primary care provider, you can also send messages to your care team and make appointments. If you have questions, please call your primary care clinic.  If you do not have a primary care provider, please call [phone] and they will assist you.        Care EveryWhere ID     This is your Care EveryWhere ID. This could be used by other organizations to access your [place] medical records  [id number]         Blood Pressure from Last 3 Encounters:   12/20/17 115/65   11/22/17 122/72   04/13/16 133/74    Weight from Last 3 Encounters:   12/20/17 80.7 kg (178 lb)   11/22/17 79.6 kg (175 lb 8 oz)   04/13/16 62.3 kg (137 lb 6.4 oz)               Primary Care Provider Office Phone # Fax #    Gill SONU Perez [facility] [phone]       [address] [facility] [address]  [facility] [address]        Equal Access to Services     MISHA ALAN : Hadii davon Worrell, waaxda luqadaha, qaybta kaalmada vrashayada, duane munoz. So [facility] [phone].    ATENCIÓN: Si habla español, tiene a alberto disposición servicios gratuitos de  asistencia lingüística. Ernesto al [phone].    We comply with applicable federal civil rights laws and [place] laws. We do not discriminate on the basis of race, color, national origin, age, disability, sex, sexual orientation, or gender identity.            Thank you!     Thank you for choosing [facility]  for your care. Our goal is always to provide you with excellent care. Hearing back from our patients is one way we can continue to improve our services. Please take a few minutes to complete the written survey that you may receive in the mail after your visit with us. Thank you!             Your Updated Medication List - Protect others around you: Learn how to safely use, store and throw away your medicines at www.disposemymeds.org.          This list is accurate as of: 1/12/18 11:50 AM.  Always use your most recent med list.                   Brand Name Dispense Instructions for use Diagnosis    prenatal multivitamin plus iron 27-0.8 MG Tabs per tablet      Take 1 tablet by mouth daily        PROGESTERONE MICRONIZED PO      Take 200 mg by mouth daily

## 2018-01-12 NOTE — PROGRESS NOTES
Please refer to ultrasound report under 'Imaging' Studies of 'Chart Review' tabs.    Juan Lawton M.D.

## 2018-01-13 LAB
# FETUSES US: NORMAL
AFP ADJ MOM AMN: 1.31
AFP SERPL-MCNC: 51 NG/ML
AGE - REPORTED: 32.3 YR
DATING METHOD: NORMAL
DIABETIC AT CONCEPTION: NO
FAMILY MEMBER DISEASES HX: NO
FAMILY MEMBER DISEASES HX: NO
GA METHOD: NORMAL
GA: 18.29 WEEKS
HX OF HEREDITARY DISORDERS: NO
IDDM PATIENT QL: NO
INTEGRATED SCN PATIENT-IMP: NORMAL
LMP START DATE: NORMAL
PREV HX CHROMOSOME ABNORMALITY: NO
SPECIMEN DRAWN SERPL: NORMAL
TWINS: NORMAL

## 2018-01-19 ENCOUNTER — PRENATAL OFFICE VISIT (OUTPATIENT)
Dept: OBGYN | Facility: CLINIC | Age: 33
End: 2018-01-19
Payer: COMMERCIAL

## 2018-01-19 VITALS
HEART RATE: 64 BPM | WEIGHT: 181 LBS | DIASTOLIC BLOOD PRESSURE: 59 MMHG | TEMPERATURE: 97.8 F | BODY MASS INDEX: 31.07 KG/M2 | SYSTOLIC BLOOD PRESSURE: 110 MMHG

## 2018-01-19 DIAGNOSIS — Z33.3 PREGNANT STATE, GESTATIONAL CARRIER: Primary | ICD-10-CM

## 2018-01-19 PROCEDURE — 99207 ZZC PRENATAL VISIT: CPT | Performed by: OBSTETRICS & GYNECOLOGY

## 2018-01-19 NOTE — PROGRESS NOTES
19w2d  Doing well.  No issues since last visit.  Had comprehensive US, which was WNL.  Girl!  Parents excited.  Has repeat US and fetal echo scheduled.  RTC 4w.  Marybel Monroe MD

## 2018-01-19 NOTE — MR AVS SNAPSHOT
After Visit Summary   1/19/2018    Ana Lilia Arrington    MRN: [id number]           Patient Information     Date Of Birth          1985        Visit Information        Provider Department      1/19/2018 1:30 PM Marybel Monroe MD [facility]        Today's Diagnoses     Pregnant state, gestational carrier    -  1       Follow-ups after your visit        Your next 10 appointments already scheduled     Feb 09, 2018 11:00 AM CST   Ech Fetal Complete* with URFETR1   [facility] Echo[facility]KG ([facility])    [address]  [facility] [address]               Feb 09, 2018  1:30 PM CST   (Arrive by 1:15 PM)   MFM US COMPRE SINGLE F/U with URMFMUSR3   [facility] [place] ([facility])    [address]  [facility] [address] [phone]           Wear comfortable clothes and leave your valuables at home.            Feb 09, 2018  2:00 PM CST   Radiology MD with UR MARGARET WALTON   [facility])    [address]  [facility] [id number] [phone]           Please arrive at the time given for your first appointment. This visit is used internally to schedule the physician's time during your ultrasound.            Feb 16, 2018 11:00 AM CST   ESTABLISHED PRENATAL with Marybel Monroe MD   [facility] ([facility])    [address] [place] [id number] [phone]              Who to contact     If you have questions or need follow up information about today's clinic visit or your schedule please contact [facility] directly at [phone].  Normal or non-critical lab and imaging results will be communicated to you by MyChart, letter or phone within 4 business days after the clinic has received the results. If you do not hear from us  within 7 days, please contact the clinic through [employer] or phone. If you have a critical or abnormal lab result, we will notify you by phone as soon as possible.  Submit refill requests through [employer] or call your pharmacy and they will forward the refill request to us. Please allow 3 business days for your refill to be completed.          Additional Information About Your Visit        [employer]s [employer]row[employer] Information     [employer] gives you secure access to your electronic health record. If you see a primary care provider, you can also send messages to your care team and make appointments. If you have questions, please call your primary care clinic.  If you do not have a primary care provider, please call [phone] and they will assist you.        Care EveryWhere ID     This is your Care EveryWhere ID. This could be used by other organizations to access your [place] medical records  [id number]        Your Vitals Were     Pulse Temperature BMI (Body Mass Index)             64 97.8  F (36.6  C) (Oral) 31.07 kg/m2          Blood Pressure from Last 3 Encounters:   01/19/18 110/59   12/20/17 115/65   11/22/17 122/72    Weight from Last 3 Encounters:   01/19/18 181 lb (82.1 kg)   12/20/17 178 lb (80.7 kg)   11/22/17 175 lb 8 oz (79.6 kg)              Today, you had the following     No orders found for display       Primary Care Provider Office Phone # Fax #    Gill SONU Perez [facility] [phone]       [address]  [facility] [address]        Equal Access to Services     MISHA ALAN : Hadii aad ku hadasho Soomaali, waaxda luqadaha, qaybta kaalmada adeegyada, duane hewitt . So [facility] [phone].    ATENCIÓN: Si habla español, tiene a alberto disposición servicios gratuitos de asistencia lingüística. Llame al [phone].    We comply with applicable federal civil rights laws and [place] laws. We do not discriminate on the basis of race, color, national origin, age,  disability, sex, sexual orientation, or gender identity.            Thank you!     Thank you for choosing [facility]  for your care. Our goal is always to provide you with excellent care. Hearing back from our patients is one way we can continue to improve our services. Please take a few minutes to complete the written survey that you may receive in the mail after your visit with us. Thank you!             Your Updated Medication List - Protect others around you: Learn how to safely use, store and throw away your medicines at www.disposemymeds.org.          This list is accurate as of: 1/19/18  3:19 PM.  Always use your most recent med list.                   Brand Name Dispense Instructions for use Diagnosis    prenatal multivitamin plus iron 27-0.8 MG Tabs per tablet      Take 1 tablet by mouth daily        PROGESTERONE MICRONIZED PO      Take 200 mg by mouth daily

## 2018-02-09 ENCOUNTER — HOSPITAL ENCOUNTER (OUTPATIENT)
Dept: ULTRASOUND IMAGING | Facility: CLINIC | Age: 33
End: 2018-02-09
Attending: OBSTETRICS & GYNECOLOGY
Payer: COMMERCIAL

## 2018-02-09 ENCOUNTER — OFFICE VISIT (OUTPATIENT)
Dept: MATERNAL FETAL MEDICINE | Facility: CLINIC | Age: 33
End: 2018-02-09
Attending: OBSTETRICS & GYNECOLOGY
Payer: COMMERCIAL

## 2018-02-09 ENCOUNTER — HOSPITAL ENCOUNTER (OUTPATIENT)
Dept: CARDIOLOGY | Facility: CLINIC | Age: 33
Discharge: HOME | End: 2018-02-09
Attending: OBSTETRICS & GYNECOLOGY | Admitting: OBSTETRICS & GYNECOLOGY
Payer: COMMERCIAL

## 2018-02-09 DIAGNOSIS — O09.819 PREGNANCY RESULTING FROM IN VITRO FERTILIZATION, ANTEPARTUM: ICD-10-CM

## 2018-02-09 DIAGNOSIS — Z36.89 ENCOUNTER FOR ULTRASOUND TO CHECK FETAL GROWTH: Primary | ICD-10-CM

## 2018-02-09 PROCEDURE — 76816 OB US FOLLOW-UP PER FETUS: CPT

## 2018-02-09 PROCEDURE — 76825 ECHO EXAM OF FETAL HEART: CPT

## 2018-02-09 NOTE — MR AVS SNAPSHOT
After Visit Summary   2/9/2018    Ana Lilia Arrington    MRN: [id number]           Patient Information     Date Of Birth          1985        Visit Information        Provider Department      2/9/2018 2:00 PM Jesenia Askew DO [facility] Maternal Fetal Medicine [facility]        Today's Diagnoses     Encounter for ultrasound to check fetal growth    -  1    Pregnancy resulting from in vitro fertilization, antepartum           Follow-ups after your visit        Your next 10 appointments already scheduled     Feb 16, 2018 11:00 AM CST   ESTABLISHED PRENATAL with [facility]bel [facility] MD   [facility]    [address]  [address]  [facility] [address] [phone]            Mar 09, 2018 11:00 AM CST   (Arrive by 10:45 AM)   MFM US COMPRE SINGLE F/U with URMFMUSR1   [facility]ound - [place] ([facility])    [address]  [facility] [address] [phone]           Wear comfortable clothes and leave your valuables at home.            Mar 09, 2018 11:30 AM CST   Radiology MD with UR MARGARET WALTON   [facility] [place])    [address]  [facility] [id number] [phone]           Please arrive at the time given for your first appointment. This visit is used internally to schedule the physician's time during your ultrasound.              Future tests that were ordered for you today     Open Future Orders        Priority Expected Expires Ordered    MFM US Comprehensive Single F/U Routine 3/9/2018 2/9/2019 2/9/2018            Who to contact     If you have questions or need follow up information about today's clinic visit or your schedule please contact [facility] MATERNAL FETAL MEDICINE [facility] directly at [phone].  Normal or non-critical lab and imaging results will be communicated to you by  MyChart, letter or phone within 4 business days after the clinic has received the results. If you do not hear from us within 7 days, please contact the clinic through [employer] or phone. If you have a critical or abnormal lab result, we will notify you by phone as soon as possible.  Submit refill requests through Escapio or call your pharmacy and they will forward the refill request to us. Please allow 3 business days for your refill to be completed.          Additional Information About Your Visit        [employer] Information     [employer] gives you secure access to your electronic health record. If you see a primary care provider, you can also send messages to your care team and make appointments. If you have questions, please call your primary care clinic.  If you do not have a primary care provider, please call [phone] and they will assist you.        Care EveryWhere ID     This is your Care EveryWhere ID. This could be used by other organizations to access your [place] medical records  [id number]         Blood Pressure from Last 3 Encounters:   01/19/18 110/59   12/20/17 115/65   11/22/17 122/72    Weight from Last 3 Encounters:   01/19/18 82.1 kg (181 lb)   12/20/17 80.7 kg (178 lb)   11/22/17 79.6 kg (175 lb 8 oz)               Primary Care Provider Office Phone # Fax #    Gill SONU Perez [facility] [phone]       [address]  [facility] [address]        Equal Access to Services     MISHA ALAN AH: Hadii davon sappo Soantonioali, waaxda luqadaha, qaybta kaalmada adeegyada, duane hewitt . So [facility] [phone].    ATENCIÓN: Si habla español, tiene a alberto disposición servicios gratuitos de asistencia lingüística. Ernesto al [phone].    We comply with applicable federal civil rights laws and [place] laws. We do not discriminate on the basis of race, color, national origin, age, disability, sex, sexual orientation, or gender identity.            Thank  you!     Thank you for choosing [facility]  for your care. Our goal is always to provide you with excellent care. Hearing back from our patients is one way we can continue to improve our services. Please take a few minutes to complete the written survey that you may receive in the mail after your visit with us. Thank you!             Your Updated Medication List - Protect others around you: Learn how to safely use, store and throw away your medicines at www.disposemymeds.org.          This list is accurate as of 2/9/18  2:13 PM.  Always use your most recent med list.                   Brand Name Dispense Instructions for use Diagnosis    prenatal multivitamin plus iron 27-0.8 MG Tabs per tablet      Take 1 tablet by mouth daily        PROGESTERONE MICRONIZED PO      Take 200 mg by mouth daily

## 2018-02-09 NOTE — PROGRESS NOTES
"Please see \"Imaging\" tab under \"Chart Review\" for details of today's US.      Jesenia Askew, DO  Maternal-Fetal Medicine        "

## 2018-02-16 ENCOUNTER — PRENATAL OFFICE VISIT (OUTPATIENT)
Dept: OBGYN | Facility: CLINIC | Age: 33
End: 2018-02-16
Payer: COMMERCIAL

## 2018-02-16 VITALS
HEART RATE: 62 BPM | WEIGHT: 186.8 LBS | TEMPERATURE: 97.9 F | DIASTOLIC BLOOD PRESSURE: 72 MMHG | BODY MASS INDEX: 32.06 KG/M2 | SYSTOLIC BLOOD PRESSURE: 106 MMHG

## 2018-02-16 DIAGNOSIS — Z33.3 PREGNANT STATE, GESTATIONAL CARRIER: ICD-10-CM

## 2018-02-16 DIAGNOSIS — Z34.80 PRENATAL CARE, SUBSEQUENT PREGNANCY, UNSPECIFIED TRIMESTER: Primary | ICD-10-CM

## 2018-02-16 PROCEDURE — 99207 ZZC PRENATAL VISIT: CPT | Performed by: OBSTETRICS & GYNECOLOGY

## 2018-02-16 NOTE — PROGRESS NOTES
23w2d  Doing well.  Sent message with contraction recently, but it was only one and eventually resolved.  No vaginal bleeding or leakage of fluid.  +fetal movement.  Has f/u US scheduled on 3/9.  FL 4%ile, but suspecting constitutional, not skeletal dysplasia.  Will schedule GCT for 3/9, PNV in 4w.  Marybel Monroe MD

## 2018-02-16 NOTE — MR AVS SNAPSHOT
After Visit Summary   2/16/2018    Ana Lilia Arrington    MRN: [id number]           Patient Information     Date Of Birth          1985        Visit Information        Provider Department      2/16/2018 11:00 AM Marybel Monroe MD [facility]        Today's Diagnoses     Prenatal care, subsequent pregnancy, unspecified trimester    -  1    Pregnant state, gestational carrier           Follow-ups after your visit        Your next 10 appointments already scheduled     Mar 09, 2018  8:45 AM CST   LAB with RD LAB   [facility] ([facility])    [address] [facility] [address]  [facility] [id number] [phone]           Please do not eat 10-12 hours before your appointment if you are coming in fasting for labs on lipids, cholesterol, or glucose (sugar). This does not apply to pregnant women. Water, hot tea and black coffee (with nothing added) are okay. Do not drink other fluids, diet soda or chew gum.            Mar 09, 2018 11:00 AM CST   (Arrive by 10:45 AM)   MFM US COMPRE SINGLE F/U with [id number]   [facility] Maternal Fetal Medicine Ultrasound - [facility])    [address]  [facility] [address] [phone]           Wear comfortable clothes and leave your valuables at home.            Mar 09, 2018 11:30 AM CST   Radiology MD with UR MARGARET WALTON   [facility]etal [facility] - [facility])    [address]  [facility] [address] [phone]           Please arrive at the time given for your first appointment. This visit is used internally to schedule the physician's time during your ultrasound.            Mar 16, 2018 11:30 AM CDT   ESTABLISHED PRENATAL with Marybel Monroe MD   [facility] ([facility])    [address] [facility] [address]  [facility] [address] [phone]              Future  tests that were ordered for you today     Open Future Orders        Priority Expected Expires Ordered    OB Hemoglobin: Future Routine  8/15/2018 2/16/2018    Glucose Challenge Test (GCT)1 Hour, (Future) Routine  4/13/2018 2/16/2018            Who to contact     If you have questions or need follow up information about today's clinic visit or your schedule please contact [facility] directly at [phone].  Normal or non-critical lab and imaging results will be communicated to you by [employer], letter or phone within 4 business days after the clinic has received the results. If you do not hear from us within 7 days, please contact the clinic through [employer] or phone. If you have a critical or abnormal lab result, we will notify you by phone as soon as possible.  Submit refill requests through [employer] or call your pharmacy and they will forward the refill request to us. Please allow 3 business days for your refill to be completed.          Additional Information About Your Visit        [employer] Information     [employer] gives you secure access to your electronic health record. If you see a primary care provider, you can also send messages to your care team and make appointments. If you have questions, please call your primary care clinic.  If you do not have a primary care provider, please call [phone] and they will assist you.        Care EveryWhere ID     This is your Care EveryWhere ID. This could be used by other organizations to access your [facility] medical records  [id number]        Your Vitals Were     Pulse Temperature BMI (Body Mass Index)             62 97.9  F (36.6  C) (Oral) 32.06 kg/m2          Blood Pressure from Last 3 Encounters:   02/16/18 106/72   01/19/18 110/59   12/20/17 115/65    Weight from Last 3 Encounters:   02/16/18 186 lb 12.8 oz (84.7 kg)   01/19/18 181 lb (82.1 kg)   12/20/17 178 lb (80.7 kg)                 Today's Medication Changes          These changes are  accurate as of 2/16/18 12:01 PM.  If you have any questions, ask your nurse or doctor.               Stop taking these medicines if you haven't already. Please contact your care team if you have questions.     PROGESTERONE MICRONIZED PO   Stopped by:  Marybel Monroe MD                    Primary Care Provider Office Phone # Fax #    [facility]U [facility] [phone]       [address] [facility] [address]  [place] [address]        Equal Access to Services     [facility] HospitalMAHIN : Hadii aad ku hadasho Soomaali, waaxda luqadaha, qaybta kaalmada adeegyada, waxay idiin hayaan adeeg kharash marcelina . So [facility] [phone].    ATENCIÓN: Si habla español, tiene a alberto disposición servicios gratuitos de asistencia lingüística. [facility] [phone].    We comply with applicable federal civil rights laws and [place] laws. We do not discriminate on the basis of race, color, national origin, age, disability, sex, sexual orientation, or gender identity.            Thank you!     Thank you for choosing [facility]  for your care. Our goal is always to provide you with excellent care. Hearing back from our patients is one way we can continue to improve our services. Please take a few minutes to complete the written survey that you may receive in the mail after your visit with us. Thank you!             Your Updated Medication List - Protect others around you: Learn how to safely use, store and throw away your medicines at www.disposemymeds.org.          This list is accurate as of 2/16/18 12:01 PM.  Always use your most recent med list.                   Brand Name Dispense Instructions for use Diagnosis    prenatal multivitamin plus iron 27-0.8 MG Tabs per tablet      Take 1 tablet by mouth daily

## 2018-03-09 ENCOUNTER — OFFICE VISIT (OUTPATIENT)
Dept: MATERNAL FETAL MEDICINE | Facility: CLINIC | Age: 33
End: 2018-03-09
Attending: OBSTETRICS & GYNECOLOGY
Payer: COMMERCIAL

## 2018-03-09 ENCOUNTER — HOSPITAL ENCOUNTER (OUTPATIENT)
Dept: ULTRASOUND IMAGING | Facility: CLINIC | Age: 33
Discharge: HOME | End: 2018-03-09
Attending: OBSTETRICS & GYNECOLOGY | Admitting: OBSTETRICS & GYNECOLOGY
Payer: COMMERCIAL

## 2018-03-09 DIAGNOSIS — Z34.80 PRENATAL CARE, SUBSEQUENT PREGNANCY, UNSPECIFIED TRIMESTER: ICD-10-CM

## 2018-03-09 DIAGNOSIS — Z36.89 ENCOUNTER FOR ULTRASOUND TO CHECK FETAL GROWTH: ICD-10-CM

## 2018-03-09 DIAGNOSIS — O35.9XX0 SUSPECTED FETAL ANOMALY, ANTEPARTUM, SINGLE OR UNSPECIFIED FETUS: Primary | ICD-10-CM

## 2018-03-09 LAB
GLUCOSE 1H P 50 G GLC PO SERPL-MCNC: 98 MG/DL (ref 60–129)
HGB BLD-MCNC: 11.4 G/DL (ref 11.7–15.7)

## 2018-03-09 PROCEDURE — 36415 COLL VENOUS BLD VENIPUNCTURE: CPT | Performed by: OBSTETRICS & GYNECOLOGY

## 2018-03-09 PROCEDURE — 00000218 ZZHCL STATISTIC OBHBG - HEMOGLOBIN: Performed by: OBSTETRICS & GYNECOLOGY

## 2018-03-09 PROCEDURE — 82950 GLUCOSE TEST: CPT | Performed by: OBSTETRICS & GYNECOLOGY

## 2018-03-09 PROCEDURE — 76816 OB US FOLLOW-UP PER FETUS: CPT

## 2018-03-09 NOTE — MR AVS SNAPSHOT
After Visit Summary   3/9/2018    Ana Lilia Arrington    MRN: [id number]           Patient Information     Date Of Birth          1985        Visit Information        Provider Department      3/9/2018 11:30 AM Berry Coe MD [facility] Maternal Fetal Medicine [facility]        Today's Diagnoses     Suspected fetal anomaly, antepartum, single or unspecified fetus    -  1    Encounter for ultrasound to check fetal growth           Follow-ups after your visit        Your next 10 appointments already scheduled     Mar 16, 2018 11:30 AM CDT   ESTABLISHED PRENATAL with Marybel Monroe MD   [facility] ([facility])    [address] [facility] [address]  [facility] [address]   [phone]            Apr 20, 2018  1:30 PM CDT   (Arrive by 1:15 PM)   MFM US COMPRE SINGLE F/U with [id number]   [facility] Maternal Fetal Medicine Ultrasound [facility] ([facility])    [address]  [facility] [address] [phone]           Wear comfortable clothes and leave your valuables at home.            Apr 20, 2018  2:00 PM CDT   Radiology MD with UR MARGARET WALTON   [facility] Maternal Fetal Medicine - [place] ([facility])    [address]  [facility] [address] [phone]           Please arrive at the time given for your first appointment. This visit is used internally to schedule the physician's time during your ultrasound.              Future tests that were ordered for you today     Open Future Orders        Priority Expected Expires Ordered    MFM US Comprehensive Single F/U Routine 4/20/2018 3/9/2019 3/9/2018            Who to contact     If you have questions or need follow up information about today's clinic visit or your schedule please contact [facility] MATERNAL FETAL MEDICINE [facility] directly at [phone].  Normal or non-critical lab and imaging results will be communicated to you  by [employer], letter or phone within 4 business days after the clinic has received the results. If you do not hear from us within 7 days, please contact the clinic through [employer] or phone. If you have a critical or abnormal lab result, we will notify you by phone as soon as possible.  Submit refill requests through [employer] or call your pharmacy and they will forward the refill request to us. Please allow 3 business days for your refill to be completed.          Additional Information About Your Visit        [employer] Information     [employer] gives you secure access to your electronic health record. If you see a primary care provider, you can also send messages to your care team and make appointments. If you have questions, please call your primary care clinic.  If you do not have a primary care provider, please call [phone] and they will assist you.        Care EveryWhere ID     This is your Care EveryWhere ID. This could be used by other organizations to access your [facility] medical records  [id number]         Blood Pressure from Last 3 Encounters:   02/16/18 106/72   01/19/18 110/59   12/20/17 115/65    Weight from Last 3 Encounters:   02/16/18 84.7 kg (186 lb 12.8 oz)   01/19/18 82.1 kg (181 lb)   12/20/17 80.7 kg (178 lb)               Primary Care Provider Office Phone # Fax #    Gill SONU Perez [facility] [phone]       [address] [facility] [address]  [facility] [address]        Equal Access to Services     MISHA ALAN : Hadii davon ku hadasho Soomaali, waaxda luqadaha, qaybta kaalmada adeegyada, duane hewitt . So [facility] [phone].    ATENCIÓN: Si habla mauricioañol, tiene a alberto disposición servicios gratuitos de asistencia lingüística. Llame al [phone].    We comply with applicable federal civil rights laws and [place] laws. We do not discriminate on the basis of race, color, national origin, age, disability, sex, sexual orientation, or gender identity.             Thank you!     Thank you for choosing [facility]  for your care. Our goal is always to provide you with excellent care. Hearing back from our patients is one way we can continue to improve our services. Please take a few minutes to complete the written survey that you may receive in the mail after your visit with us. Thank you!             Your Updated Medication List - Protect others around you: Learn how to safely use, store and throw away your medicines at www.disposemymeds.org.          This list is accurate as of 3/9/18 11:37 AM.  Always use your most recent med list.                   Brand Name Dispense Instructions for use Diagnosis    prenatal multivitamin plus iron 27-0.8 MG Tabs per tablet      Take 1 tablet by mouth daily

## 2018-03-09 NOTE — PROGRESS NOTES
"Please see \"Imaging\" tab under \"Chart Review\" for details of today's US at the [facility].    Berry Coe MD  Maternal-Fetal Medicine      "

## 2018-03-16 ENCOUNTER — PRENATAL OFFICE VISIT (OUTPATIENT)
Dept: OBGYN | Facility: CLINIC | Age: 33
End: 2018-03-16
Payer: COMMERCIAL

## 2018-03-16 VITALS
SYSTOLIC BLOOD PRESSURE: 95 MMHG | BODY MASS INDEX: 32.1 KG/M2 | WEIGHT: 187 LBS | TEMPERATURE: 97.9 F | DIASTOLIC BLOOD PRESSURE: 57 MMHG | HEART RATE: 66 BPM

## 2018-03-16 DIAGNOSIS — Z34.82 ENCOUNTER FOR SUPERVISION OF OTHER NORMAL PREGNANCY IN SECOND TRIMESTER: Primary | ICD-10-CM

## 2018-03-16 PROCEDURE — 99207 ZZC PRENATAL VISIT: CPT | Performed by: OBSTETRICS & GYNECOLOGY

## 2018-03-16 NOTE — PROGRESS NOTES
"27w2d  Active fetal movement. No leaking or bleeding, no pain or cramping.   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  Last ultrasound showed EFW 31%. \"Growth parameters and estimated fetal weight were consistent with appropriate for gestational age pattern of growth. There has been normal interval growth of the femur bones. Fetal anatomy appeared normal for gestational age.\"  Gtt wnl (98) and Hgb 11.4  Childbirth classes? No  Plan on breastfeeding? Might pump   Birthcontrol? Undecided   Gender on ultrasound? girl  Circumsion? NA  Peds doc? Unsure  RTC 3 weeks.  Marybel Pisano MD  "

## 2018-03-16 NOTE — LETTER
[facility]  [address] [address]  [facility] [address]  [phone]      March 16, 2018      Ana Lilia Arrington  [address]  [facility] [address]              To Whom It May Concern:    Ana Lilia Arrington is pregnant and being seen in our clinic for prenatal care.  Her Estimated Date of Delivery: Jun 13, 2018. Patient may continue to work with reasonable modifications. I recommend the following restrictions: no lifting greater than 8lb. She may use an upright vacuum but cannot carry a backpack vacuum. Thank you for your anticipated understanding. Please contact our clinic if questions, [phone].         Sincerely,            Marybel Pisano MD

## 2018-03-16 NOTE — MR AVS SNAPSHOT
After Visit Summary   3/16/2018    Ana Lilia Arrington    MRN: [id number]           Patient Information     Date Of Birth          1985        Visit Information        Provider Department      3/16/2018 11:30 AM Marybel Pisano MD [facility]        Today's Diagnoses     Encounter for supervision of other normal pregnancy in second trimester    -  1       Follow-ups after your visit        Follow-up notes from your care team     Return in about 3 weeks (around 4/6/2018).      Your next 10 appointments already scheduled     Apr 06, 2018 11:30 AM CDT   ESTABLISHED PRENATAL with Marybel Monroe MD   [facility] ([facility])    [address] [facility] [address]  [facility] [address] [phone]            Apr 20, 2018  1:30 PM CDT   (Arrive by 1:15 PM)   MFM US COMPRE SINGLE F/U with [id number]   [facility] Maternal Fetal Medicine Ultrasound - [facility])    [address]  [facility] [address] [phone]           Wear comfortable clothes and leave your valuables at home.            Apr 20, 2018  2:00 PM CDT   Radiology MD with UR MARGARET WALTON   [facility] M[facility]etal [facility])    [address]  [facility] [address] [phone]           Please arrive at the time given for your first appointment. This visit is used internally to schedule the physician's time during your ultrasound.              Who to contact     If you have questions or need follow up information about today's clinic visit or your schedule please contact [facility] directly at [phone].  Normal or non-critical lab and imaging results will be communicated to you by MyChart, letter or phone within 4 business days after the clinic has received the results. If you do not hear from us within 7 days, please contact the clinic through  [employer] or phone. If you have a critical or abnormal lab result, we will notify you by phone as soon as possible.  Submit refill requests through [email or web address] or call your pharmacy and they will forward the refill request to us. Please allow 3 business days for your refill to be completed.          Additional Information About Your Visit        [employer] Information     [email or web address] gives you secure access to your electronic health record. If you see a primary care provider, you can also send messages to your care team and make appointments. If you have questions, please call your primary care clinic.  If you do not have a primary care provider, please call [phone] and they will assist you.        Care EveryWhere ID     This is your Care EveryWhere ID. This could be used by other organizations to access your [place] medical records  [id number]        Your Vitals Were     Pulse Temperature BMI (Body Mass Index)             66 97.9  F (36.6  C) (Oral) 32.1 kg/m2          Blood Pressure from Last 3 Encounters:   03/16/18 95/57   02/16/18 106/72   01/19/18 110/59    Weight from Last 3 Encounters:   03/16/18 187 lb (84.8 kg)   02/16/18 186 lb 12.8 oz (84.7 kg)   01/19/18 181 lb (82.1 kg)              Today, you had the following     No orders found for display       Primary Care Provider Office Phone # Fax #    Gill Saenz SONU [facility] [phone]       [address] [place] [address]  [facility] [address]        Equal Access to Services     MISHA ALAN : Hadii aad ku hadasho Soomaali, waaxda luqadaha, qaybta kaalmada adeegyada, waxay blue haytun varsha hewitt . So [facility] [phone].    ATENCIÓN: Si habla español, tiene a alberto disposición servicios gratuitos de asistencia lingüística. Llame al [phone].    We comply with applicable federal civil rights laws and [place] laws. We do not discriminate on the basis of race, color, national origin, age, disability, sex, sexual orientation, or gender  identity.            Thank you!     Thank you for choosing [facility]  for your care. Our goal is always to provide you with excellent care. Hearing back from our patients is one way we can continue to improve our services. Please take a few minutes to complete the written survey that you may receive in the mail after your visit with us. Thank you!             Your Updated Medication List - Protect others around you: Learn how to safely use, store and throw away your medicines at www.disposemymeds.org.          This list is accurate as of 3/16/18 11:45 AM.  Always use your most recent med list.                   Brand Name Dispense Instructions for use Diagnosis    prenatal multivitamin plus iron 27-0.8 MG Tabs per tablet      Take 1 tablet by mouth daily

## 2018-04-06 ENCOUNTER — PRENATAL OFFICE VISIT (OUTPATIENT)
Dept: OBGYN | Facility: CLINIC | Age: 33
End: 2018-04-06
Payer: COMMERCIAL

## 2018-04-06 VITALS
DIASTOLIC BLOOD PRESSURE: 74 MMHG | BODY MASS INDEX: 32.96 KG/M2 | HEART RATE: 74 BPM | TEMPERATURE: 98.1 F | SYSTOLIC BLOOD PRESSURE: 111 MMHG | WEIGHT: 192 LBS

## 2018-04-06 DIAGNOSIS — Z33.3 PREGNANT STATE, GESTATIONAL CARRIER: Primary | ICD-10-CM

## 2018-04-06 DIAGNOSIS — Z23 NEED FOR TDAP VACCINATION: ICD-10-CM

## 2018-04-06 PROCEDURE — 90715 TDAP VACCINE 7 YRS/> IM: CPT | Performed by: OBSTETRICS & GYNECOLOGY

## 2018-04-06 PROCEDURE — 90471 IMMUNIZATION ADMIN: CPT | Performed by: OBSTETRICS & GYNECOLOGY

## 2018-04-06 PROCEDURE — 99207 ZZC PRENATAL VISIT: CPT | Performed by: OBSTETRICS & GYNECOLOGY

## 2018-04-06 NOTE — PROGRESS NOTES
30w2d  Doing well.  Getting more tired with pregnancy and has some cramping when she's more active at work.  Always resolves with rest.  Iris tries very hard to stay hydrated.  ++ fetal movement  Intended mom plans to come to the area by June 1 to be here for delivery.  She has some family in the area.  Tdap today  RTC 2w  Marybel Monroe MD

## 2018-04-20 ENCOUNTER — OFFICE VISIT (OUTPATIENT)
Dept: MATERNAL FETAL MEDICINE | Facility: CLINIC | Age: 33
End: 2018-04-20
Attending: OBSTETRICS & GYNECOLOGY
Payer: COMMERCIAL

## 2018-04-20 ENCOUNTER — PRENATAL OFFICE VISIT (OUTPATIENT)
Dept: OBGYN | Facility: CLINIC | Age: 33
End: 2018-04-20
Payer: COMMERCIAL

## 2018-04-20 ENCOUNTER — HOSPITAL ENCOUNTER (OUTPATIENT)
Dept: ULTRASOUND IMAGING | Facility: CLINIC | Age: 33
Discharge: HOME | End: 2018-04-20
Attending: OBSTETRICS & GYNECOLOGY | Admitting: OBSTETRICS & GYNECOLOGY
Payer: COMMERCIAL

## 2018-04-20 VITALS
BODY MASS INDEX: 33.28 KG/M2 | TEMPERATURE: 97.5 F | WEIGHT: 193.9 LBS | SYSTOLIC BLOOD PRESSURE: 118 MMHG | DIASTOLIC BLOOD PRESSURE: 72 MMHG | HEART RATE: 69 BPM

## 2018-04-20 DIAGNOSIS — O35.HXX0 SHORT LOWER EXTREMITIES OF FETUS AFFECTING ANTEPARTUM CARE OF MOTHER, SINGLE OR UNSPECIFIED FETUS: ICD-10-CM

## 2018-04-20 DIAGNOSIS — Z03.74 FETAL GROWTH PROBLEM SUSPECTED BUT NOT FOUND: Primary | ICD-10-CM

## 2018-04-20 DIAGNOSIS — O35.9XX0 SUSPECTED FETAL ANOMALY, ANTEPARTUM, SINGLE OR UNSPECIFIED FETUS: ICD-10-CM

## 2018-04-20 DIAGNOSIS — Z33.3 PREGNANT STATE, GESTATIONAL CARRIER: Primary | ICD-10-CM

## 2018-04-20 PROCEDURE — 99207 ZZC PRENATAL VISIT: CPT | Performed by: OBSTETRICS & GYNECOLOGY

## 2018-04-20 PROCEDURE — 76816 OB US FOLLOW-UP PER FETUS: CPT

## 2018-04-20 NOTE — MR AVS SNAPSHOT
After Visit Summary   4/20/2018    Ana Lilia Arrington    MRN: [id number]           Patient Information     Date Of Birth          1985        Visit Information        Provider Department      4/20/2018 2:00 PM Sindy Green MD [facility] Maternal Fetal Medicine [facility]        Today's Diagnoses     Fetal growth problem suspected but not found    -  1    Short lower extremities of fetus affecting antepartum care of mother, single or unspecified fetus           Follow-ups after your visit        Your next 10 appointments already scheduled     May 04, 2018  1:00 PM CDT   ESTABLISHED PRENATAL with Marybel Monroe MD   [facility])    [address] [place] [address] [address]  [facility] [address] [phone]            May 18, 2018  1:00 PM CDT   ESTABLISHED PRENATAL with Marybel Monroe MD   [facility] ([facility])    [address] [place] [address]  [facility] [address] [phone]              Who to contact     If you have questions or need follow up information about today's clinic visit or your schedule please contact [facility] MATERNAL FETAL MEDICINE [facility] directly at [phone].  Normal or non-critical lab and imaging results will be communicated to you by MyChart, letter or phone within 4 business days after the clinic has received the results. If you do not hear from us within 7 days, please contact the clinic through [employer] or phone. If you have a critical or abnormal lab result, we will notify you by phone as soon as possible.  Submit refill requests through [employer] or call your pharmacy and they will forward the refill request to us. Please allow 3 business days for your refill to be completed.          Additional Information About Your Visit        [employer] Information     [employer] gives you secure access to your electronic health record. If you see a primary care provider, you can also send  messages to your care team and make appointments. If you have questions, please call your primary care clinic.  If you do not have a primary care provider, please call [phone] and they will assist you.        Care EveryWhere ID     This is your Care EveryWhere ID. This could be used by other organizations to access your [place] medical records  [id number]         Blood Pressure from Last 3 Encounters:   04/20/18 118/72   04/06/18 111/74   03/16/18 95/57    Weight from Last 3 Encounters:   04/20/18 193 lb 14.4 oz (88 kg)   04/06/18 192 lb (87.1 kg)   03/16/18 187 lb (84.8 kg)              Today, you had the following     No orders found for display       Primary Care Provider Office Phone # Fax #    Gill SONU Perez [employer] [phone]       [address] [place]  [facility] [address]        Equal Access to Services     MISHA ALAN : Hadii aad ku hadasho Soomaali, waaxda luqadaha, qaybta kaalmada adeegyada, waxay idiin hayaan varsha khveronica hewitt . So [facility] -[phone].    ATENCIÓN: Si rolandola esppeter, tiene a alberto disposición servicios gratuitos de asistencia lingüística. Ernesto al [phone].    We comply with applicable federal civil rights laws and [place] laws. We do not discriminate on the basis of race, color, national origin, age, disability, sex, sexual orientation, or gender identity.            Thank you!     Thank you for choosing [facility]  for your care. Our goal is always to provide you with excellent care. Hearing back from our patients is one way we can continue to improve our services. Please take a few minutes to complete the written survey that you may receive in the mail after your visit with us. Thank you!             Your Updated Medication List - Protect others around you: Learn how to safely use, store and throw away your medicines at www.disposemymeds.org.          This list is accurate as of 4/20/18  2:17 PM.  Always use your  most recent med list.                   Brand Name Dispense Instructions for use Diagnosis    prenatal multivitamin plus iron 27-0.8 MG Tabs per tablet      Take 1 tablet by mouth daily

## 2018-04-20 NOTE — PROGRESS NOTES
32w2d feeling ok, starting to get more uncomfortable.  Lots of mvmt.  Some heartburn, discussed OTC meds she can use.  FMLA paperwork done today.  RTC 2 weeks  davin

## 2018-04-20 NOTE — PROGRESS NOTES
Please see the imaging tab for details of the ultrasound performed today.    Sindy Green MD  Specialist in Maternal-Fetal Medicine

## 2018-04-20 NOTE — MR AVS SNAPSHOT
After Visit Summary   4/20/2018    Ana Lilia Arrington    MRN: [id number]           Patient Information     Date Of Birth          1985        Visit Information        Provider Department      4/20/2018 1:00 PM Valentina Stevens MD [facility]        Today's Diagnoses     Pregnant state, gestational carrier    -  1       Follow-ups after your visit        Your next 10 appointments already scheduled     May 04, 2018  1:00 PM CDT   ESTABLISHED PRENATAL with Marybel Monroe MD   [facility] ([facility])    [address] [place] [address] [phone]            May 18, 2018  1:00 PM CDT   ESTABLISHED PRENATAL with Marybel Monroe MD   [facility] ([facility])    [address] [place] [address] [phone]              Who to contact     If you have questions or need follow up information about today's clinic visit or your schedule please contact [facility] directly at [phone].  Normal or non-critical lab and imaging results will be communicated to you by Weddingfulhart, letter or phone within 4 business days after the clinic has received the results. If you do not hear from us within 7 days, please contact the clinic through GridBridget or phone. If you have a critical or abnormal lab result, we will notify you by phone as soon as possible.  Submit refill requests through TruClinic or call your pharmacy and they will forward the refill request to us. Please allow 3 business days for your refill to be completed.          Additional Information About Your Visit        Weddingfulhart Information     TruClinic gives you secure access to your electronic health record. If you see a primary care provider, you can also send messages to your care team and make appointments. If you have questions, please call your primary care clinic.  If you do not have a primary care  provider, please call [phone] and they will assist you.        Care EveryWhere ID     This is your Care EveryWhere ID. This could be used by other organizations to access your [place] medical records  [id number]        Your Vitals Were     Pulse Temperature BMI (Body Mass Index)             69 97.5  F (36.4  C) (Oral) 33.28 kg/m2          Blood Pressure from Last 3 Encounters:   04/20/18 118/72   04/06/18 111/74   03/16/18 95/57    Weight from Last 3 Encounters:   04/20/18 193 lb 14.4 oz (88 kg)   04/06/18 192 lb (87.1 kg)   03/16/18 187 lb (84.8 kg)              Today, you had the following     No orders found for display       Primary Care Provider Office Phone # Fax #    Gill SONU Perez [facility] [phone]       [address]  [facility] [address]        Equal Access to Services     MISHA ALAN : Hadii aad ku hadasho Soomaali, waaxda luqadaha, qaybta kaalmada adeegyada, waxay idiin haytun varsha hewitt . So [facility] [phone].    ATENCIÓN: Si habla español, tiene a alberto disposición servicios gratuitos de asistencia lingüística. Llame al [phone].    We comply with applicable federal civil rights laws and [place] laws. We do not discriminate on the basis of race, color, national origin, age, disability, sex, sexual orientation, or gender identity.            Thank you!     Thank you for choosing [facility]  for your [place]. Our goal is always to provide you with excellent care. Hearing back from our patients is one way we can continue to improve our services. Please take a few minutes to complete the written survey that you may receive in the mail after your visit with us. Thank you!             Your Updated Medication List - Protect others around you: Learn how to safely use, store and throw away your medicines at www.disposemymeds.org.          This list is accurate as of 4/20/18  5:16 PM.  Always use your most recent med list.                   Brand  Name Dispense Instructions for use Diagnosis    prenatal multivitamin plus iron 27-0.8 MG Tabs per tablet      Take 1 tablet by mouth daily

## 2018-05-01 ENCOUNTER — PATIENT OUTREACH (OUTPATIENT)
Dept: CARE COORDINATION | Facility: CLINIC | Age: 33
End: 2018-05-01

## 2018-05-01 DIAGNOSIS — Z33.3 PREGNANT STATE, GESTATIONAL CARRIER: Primary | ICD-10-CM

## 2018-05-01 NOTE — PROGRESS NOTES
Clinic Care Coordination Contact  Care Team Conversations    SW received referral from Dr. Monroe. Patient is a gestational carrier. Patient is in need of a a psychosocial assessment.      SW called and left voice mail for patient to call this SW back.     Patient has appointment on Friday 05/04/2018 at 1pm.   SW will meet with patient after appointment.     BENITEZ Conner    Care Coordinator  [facility] ,[facility], [facility] Primary Care, and Women's   [phone]

## 2018-05-04 ENCOUNTER — PRENATAL OFFICE VISIT (OUTPATIENT)
Dept: OBGYN | Facility: CLINIC | Age: 33
End: 2018-05-04
Payer: COMMERCIAL

## 2018-05-04 VITALS
WEIGHT: 198 LBS | TEMPERATURE: 98.7 F | HEART RATE: 67 BPM | SYSTOLIC BLOOD PRESSURE: 120 MMHG | BODY MASS INDEX: 33.99 KG/M2 | DIASTOLIC BLOOD PRESSURE: 73 MMHG

## 2018-05-04 DIAGNOSIS — Z33.3 PREGNANT STATE, GESTATIONAL CARRIER: Primary | ICD-10-CM

## 2018-05-04 PROCEDURE — 99207 ZZC PRENATAL VISIT: CPT | Performed by: OBSTETRICS & GYNECOLOGY

## 2018-05-04 NOTE — PROGRESS NOTES
34w2d  Doing well.  No questions or concerns today.  Discussed social work consult to ensure everything is all set up for her delivery.  SW unavailable after her visit, so will have them call her.  They are already working with a  to have all the legal paperwork drawn up.  Intended mom coming to town a week before due date.  Plans to stay in the area at least one week after delivery.  No signs of labor.  Good fetal movement.  RTC 2w.  Group B Strep and hgb at that time.  Marybel Monroe MD

## 2018-05-04 NOTE — MR AVS SNAPSHOT
After Visit Summary   5/4/2018    Ana Lilia Arrington    MRN: [id number]           Patient Information     Date Of Birth          1985        Visit Information        Provider Department      5/4/2018 1:00 PM Marybel Monroe MD [facility]        Today's Diagnoses     Pregnant state, gestational carrier    -  1       Follow-ups after your visit        Your next 10 appointments already scheduled     May 18, 2018  1:00 PM CDT   ESTABLISHED PRENATAL with Marybel Monroe MD   [facility] ([facility])    [address] [place] [address] [phone]              Who to contact     If you have questions or need follow up information about today's clinic visit or your schedule please contact [facility] directly at [phone].  Normal or non-critical lab and imaging results will be communicated to you by MyChart, letter or phone within 4 business days after the clinic has received the results. If you do not hear from us within 7 days, please contact the clinic through MyChart or phone. If you have a critical or abnormal lab result, we will notify you by phone as soon as possible.  Submit refill requests through Xi'an [email or web address] or call your pharmacy and they will forward the refill request to us. Please allow 3 business days for your refill to be completed.          Additional Information About Your Visit        MyChart Information     Xi'an [email or web address] gives you secure access to your electronic health record. If you see a primary care provider, you can also send messages to your care team and make appointments. If you have questions, please call your primary care clinic.  If you do not have a primary care provider, please call [phone] and they will assist you.        Care EveryWhere ID     This is your Care EveryWhere ID. This could be used by other organizations to access your [place] medical records  [id number]         Your Vitals Were     Pulse Temperature BMI (Body Mass Index)             67 98.7  F (37.1  C) (Oral) 33.99 kg/m2          Blood Pressure from Last 3 Encounters:   05/04/18 120/73   04/20/18 118/72   04/06/18 111/74    Weight from Last 3 Encounters:   05/04/18 198 lb (89.8 kg)   04/20/18 193 lb 14.4 oz (88 kg)   04/06/18 192 lb (87.1 kg)              Today, you had the following     No orders found for display       Primary Care Provider Office Phone # Fax #    Gill Verma, APRN [phone]       [address] [facility] [address]  [facility] [address]        Equal Access to Services     MISHA ALAN : Hadii davon sappo Sorachel, waaxda luqadaha, qaybta kaalmada adeegyada, waxeliot hewitt . So [facility] [phone].    ATENCIÓN: Si habla español, tiene a alberto disposición servicios gratuitos de asistencia lingüística. Llame al [phone].    We comply with applicable federal civil rights laws and [place] laws. We do not discriminate on the basis of race, color, national origin, age, disability, sex, sexual orientation, or gender identity.            Thank you!     Thank you for choosing [facility]  for your care. Our goal is always to provide you with excellent care. Hearing back from our patients is one way we can continue to improve our services. Please take a few minutes to complete the written survey that you may receive in the mail after your visit with us. Thank you!             Your Updated Medication List - Protect others around you: Learn how to safely use, store and throw away your medicines at www.disposemymeds.org.          This list is accurate as of 5/4/18  2:14 PM.  Always use your most recent med list.                   Brand Name Dispense Instructions for use Diagnosis    prenatal multivitamin plus iron 27-0.8 MG Tabs per tablet      Take 1 tablet by mouth daily

## 2018-05-18 ENCOUNTER — PRENATAL OFFICE VISIT (OUTPATIENT)
Dept: OBGYN | Facility: CLINIC | Age: 33
End: 2018-05-18
Payer: COMMERCIAL

## 2018-05-18 ENCOUNTER — PATIENT OUTREACH (OUTPATIENT)
Dept: OBGYN | Facility: CLINIC | Age: 33
End: 2018-05-18

## 2018-05-18 VITALS
SYSTOLIC BLOOD PRESSURE: 126 MMHG | HEART RATE: 68 BPM | WEIGHT: 203 LBS | BODY MASS INDEX: 34.84 KG/M2 | TEMPERATURE: 98.3 F | DIASTOLIC BLOOD PRESSURE: 78 MMHG

## 2018-05-18 DIAGNOSIS — Z33.3 PREGNANT STATE, GESTATIONAL CARRIER: Primary | ICD-10-CM

## 2018-05-18 LAB — HGB BLD-MCNC: 11.2 G/DL (ref 11.7–15.7)

## 2018-05-18 PROCEDURE — 87186 SC STD MICRODIL/AGAR DIL: CPT | Performed by: OBSTETRICS & GYNECOLOGY

## 2018-05-18 PROCEDURE — 00000218 ZZHCL STATISTIC OBHBG - HEMOGLOBIN: Performed by: OBSTETRICS & GYNECOLOGY

## 2018-05-18 PROCEDURE — 99207 ZZC PRENATAL VISIT: CPT | Performed by: OBSTETRICS & GYNECOLOGY

## 2018-05-18 PROCEDURE — 36416 COLLJ CAPILLARY BLOOD SPEC: CPT | Performed by: OBSTETRICS & GYNECOLOGY

## 2018-05-18 PROCEDURE — 87653 STREP B DNA AMP PROBE: CPT | Performed by: OBSTETRICS & GYNECOLOGY

## 2018-05-18 NOTE — PROGRESS NOTES
36w2d  Feeling more pelvic pressure and occ mild cramping.  No vaginal bleeding or leakage of fluid.  + fetal movement.  Meeting with SW after visit today to ensure all documentation is set for time of delivery.  Group B Strep, hgb today  RTC weekly  Marybel Monroe MD

## 2018-05-18 NOTE — PROGRESS NOTES
Clinic Care Coordination Contact  Care Team Conversations    SW met with patient in clinic.     Patient currently lives with  and children. Patient works full time. Patient is currently a gestational carrier. Patient is working with an  and has filed paperwork with the courts.     Patient provided SW with the court documents.     SW sent them to [employer] group to reviews.     At this time patient does not have any SW needs. SW will no longer follow.     BENITEZ Conner    Care Coordinator  [facility] ,[facility] Primary Care, and Women's   [phone]

## 2018-05-18 NOTE — MR AVS SNAPSHOT
"              After Visit Summary   2018    Ana Lilia Arrington    MRN: [id number]           Patient Information     Date Of Birth          1985        Visit Information        Provider Department      2018 1:00 PM Marybel Monroe MD [facility]        Today's Diagnoses     Pregnant state, gestational carrier    -  1       Follow-ups after your visit        Who to contact     If you have questions or need follow up information about today's clinic visit or your schedule please contact [facility] directly at [phone].  Normal or non-critical lab and imaging results will be communicated to you by Custorahart, letter or phone within 4 business days after the clinic has received the results. If you do not hear from us within 7 days, please contact the clinic through Nubeet or phone. If you have a critical or abnormal lab result, we will notify you by phone as soon as possible.  Submit refill requests through [email or web address] or call your pharmacy and they will forward the refill request to us. Please allow 3 business days for your refill to be completed.          Additional Information About Your Visit        MyChart Information     [email or web address] lets you send messages to your doctor, view your test results, renew your prescriptions, schedule appointments and more. To sign up, go to [email or web address] . Click on \"Log in\" on the left side of the screen, which will take you to the Welcome page. Then click on \"Sign up Now\" on the right side of the page.     You will be asked to enter the access code listed below, as well as some personal information. Please follow the directions to create your username and password.     Your access code is: [email or web address]  Expires: 2018  1:39 PM     Your access code will  in 90 days. If you need help or a new code, please call your [facility] or [phone].        Care EveryWhere ID     This is your Care EveryWhere ID. This could be used " by other organizations to access your [place] medical records  [id number]        Your Vitals Were     Pulse Temperature BMI (Body Mass Index)             68 98.3  F (36.8  C) (Oral) 34.84 kg/m2          Blood Pressure from Last 3 Encounters:   05/18/18 126/78   05/04/18 120/73   04/20/18 118/72    Weight from Last 3 Encounters:   05/18/18 203 lb (92.1 kg)   05/04/18 198 lb (89.8 kg)   04/20/18 193 lb 14.4 oz (88 kg)              We Performed the Following     Group B strep PCR     OB hemoglobin        Primary Care Provider Office Phone # Fax #    Gill Saenz Bayron, APRN [facility] [phone]       [address] [facility] [address]  [facility] [address]        Equal Access to Services     MISHA ALAN : Hadii davon sappo Sorachel, waaxda luqadaha, qaybta kaalmada adeclaritayanellie, duane hewitt . So [facility] [phone].    ATENCIÓN: Si habla español, tiene a alberto disposición servicios gratuitos de asistencia lingüística. Ernesto al [phone].    We comply with applicable federal civil rights laws and [place] laws. We do not discriminate on the basis of race, color, national origin, age, disability, sex, sexual orientation, or gender identity.            Thank you!     Thank you for choosing [facility]  for your care. Our goal is always to provide you with excellent care. Hearing back from our patients is one way we can continue to improve our services. Please take a few minutes to complete the written survey that you may receive in the mail after your visit with us. Thank you!             Your Updated Medication List - Protect others around you: Learn how to safely use, store and throw away your medicines at www.disposemymeds.org.          This list is accurate as of 5/18/18  1:39 PM.  Always use your most recent med list.                   Brand Name Dispense Instructions for use Diagnosis    prenatal multivitamin plus iron 27-0.8 MG Tabs per tablet      Take 1 tablet by mouth  daily

## 2018-05-19 LAB
GP B STREP DNA SPEC QL NAA+PROBE: POSITIVE
SPECIMEN SOURCE: ABNORMAL

## 2018-05-23 LAB
BACTERIA SPEC CULT: ABNORMAL
SPECIMEN SOURCE: ABNORMAL

## 2018-05-25 ENCOUNTER — PRENATAL OFFICE VISIT (OUTPATIENT)
Dept: OBGYN | Facility: CLINIC | Age: 33
End: 2018-05-25
Payer: COMMERCIAL

## 2018-05-25 VITALS
SYSTOLIC BLOOD PRESSURE: 122 MMHG | HEART RATE: 60 BPM | TEMPERATURE: 98.4 F | BODY MASS INDEX: 34.72 KG/M2 | WEIGHT: 202.3 LBS | DIASTOLIC BLOOD PRESSURE: 77 MMHG

## 2018-05-25 DIAGNOSIS — Z33.3 PREGNANT STATE, GESTATIONAL CARRIER: Primary | ICD-10-CM

## 2018-05-25 DIAGNOSIS — B95.1 POSITIVE GBS TEST: ICD-10-CM

## 2018-05-25 PROCEDURE — 99207 ZZC PRENATAL VISIT: CPT | Performed by: OBSTETRICS & GYNECOLOGY

## 2018-05-25 RX ORDER — ACETAMINOPHEN 325 MG/1
650 TABLET ORAL EVERY 4 HOURS PRN
Status: CANCELLED | OUTPATIENT
Start: 2018-05-25

## 2018-05-25 RX ORDER — OXYTOCIN 10 [USP'U]/ML
10 INJECTION, SOLUTION INTRAMUSCULAR; INTRAVENOUS
Status: CANCELLED | OUTPATIENT
Start: 2018-05-25

## 2018-05-25 RX ORDER — SODIUM CHLORIDE, SODIUM LACTATE, POTASSIUM CHLORIDE, CALCIUM CHLORIDE 600; 310; 30; 20 MG/100ML; MG/100ML; MG/100ML; MG/100ML
INJECTION, SOLUTION INTRAVENOUS CONTINUOUS
Status: CANCELLED | OUTPATIENT
Start: 2018-05-25

## 2018-05-25 RX ORDER — OXYTOCIN/0.9 % SODIUM CHLORIDE 30/500 ML
100-340 PLASTIC BAG, INJECTION (ML) INTRAVENOUS CONTINUOUS PRN
Status: CANCELLED | OUTPATIENT
Start: 2018-05-25

## 2018-05-25 RX ORDER — NALOXONE HYDROCHLORIDE 0.4 MG/ML
.1-.4 INJECTION, SOLUTION INTRAMUSCULAR; INTRAVENOUS; SUBCUTANEOUS
Status: CANCELLED | OUTPATIENT
Start: 2018-05-25

## 2018-05-25 RX ORDER — OXYCODONE AND ACETAMINOPHEN 5; 325 MG/1; MG/1
1 TABLET ORAL
Status: CANCELLED | OUTPATIENT
Start: 2018-05-25

## 2018-05-25 RX ORDER — ONDANSETRON 2 MG/ML
4 INJECTION INTRAMUSCULAR; INTRAVENOUS EVERY 6 HOURS PRN
Status: CANCELLED | OUTPATIENT
Start: 2018-05-25

## 2018-05-25 RX ORDER — FENTANYL CITRATE 50 UG/ML
50-100 INJECTION, SOLUTION INTRAMUSCULAR; INTRAVENOUS
Status: CANCELLED | OUTPATIENT
Start: 2018-05-25

## 2018-05-25 RX ORDER — METHYLERGONOVINE MALEATE 0.2 MG/ML
200 INJECTION INTRAVENOUS
Status: CANCELLED | OUTPATIENT
Start: 2018-05-25

## 2018-05-25 RX ORDER — CARBOPROST TROMETHAMINE 250 UG/ML
250 INJECTION, SOLUTION INTRAMUSCULAR
Status: CANCELLED | OUTPATIENT
Start: 2018-05-25

## 2018-05-25 RX ORDER — IBUPROFEN 200 MG
800 TABLET ORAL
Status: CANCELLED | OUTPATIENT
Start: 2018-05-25

## 2018-05-25 NOTE — MR AVS SNAPSHOT
"              After Visit Summary   5/25/2018    Ana Lilia Arrington    MRN: [id number]           Patient Information     Date Of Birth          1985        Visit Information        Provider Department      5/25/2018 9:45 AM Marybel Monroe MD [facility]        Today's Diagnoses     Pregnant state, gestational carrier    -  1    Positive GBS test           Follow-ups after your visit        Your next 10 appointments already scheduled     Jun 01, 2018  9:45 AM CDT   ESTABLISHED PRENATAL with Marybel Monroe MD   [facility] ([facility])    [address] [place] [address] [phone]            Jun 08, 2018 11:45 AM CDT   ESTABLISHED PRENATAL with Valentina Stevens MD   [facility] ([facility])    [address] [place] [address] [phone]              Who to contact     If you have questions or need follow up information about today's clinic visit or your schedule please contact [facility] directly at [phone].  Normal or non-critical lab and imaging results will be communicated to you by MedeFile Internationalhart, letter or phone within 4 business days after the clinic has received the results. If you do not hear from us within 7 days, please contact the clinic through MedeFile Internationalhart or phone. If you have a critical or abnormal lab result, we will notify you by phone as soon as possible.  Submit refill requests through Radient Technologies or call your pharmacy and they will forward the refill request to us. Please allow 3 business days for your refill to be completed.          Additional Information About Your Visit        MedeFile Internationalhart Information     Radient Technologies lets you send messages to your doctor, view your test results, renew your prescriptions, schedule appointments and more. To sign up, go to [email or web address] . Click on \"Log in\" on the left side of the screen, which will take you " "to the Welcome page. Then click on \"Sign up Now\" on the right side of the page.     You will be asked to enter the access code listed below, as well as some personal information. Please follow the directions to create your username and password.     Your access code is: [email or web address]  Expires: 2018  1:39 PM     Your access code will  in 90 days. If you need help or a new code, please call your [place] clinic or [phone].        Care EveryWhere ID     This is your Care EveryWhere ID. This could be used by other organizations to access your [place] medical records  [id number]        Your Vitals Were     Pulse Temperature BMI (Body Mass Index)             60 98.4  F (36.9  C) (Oral) 34.72 kg/m2          Blood Pressure from Last 3 Encounters:   18 122/77   18 126/78   18 120/73    Weight from Last 3 Encounters:   18 202 lb 4.8 oz (91.8 kg)   18 203 lb (92.1 kg)   18 198 lb (89.8 kg)              Today, you had the following     No orders found for display       Primary Care Provider Office Phone # Fax #    [facility] [phone]       [address] [facility] [address]  [facility] [address]        Equal Access to Services     CARLITO [facility] : Hadii davon waters hadasho Sorachel, waaxda luqadaha, qaybta kaalmada adeegyada, duane hewitt . So [facility] [phone].    ATENCIÓN: Si habla español, tiene a alberto disposición servicios gratuitos de asistencia lingüística. Llame al [phone].    We comply with applicable federal civil rights laws and [place] laws. We do not discriminate on the basis of race, color, national origin, age, disability, sex, sexual orientation, or gender identity.            Thank you!     Thank you for choosing [facility]  for your care. Our goal is always to provide you with excellent care. Hearing back from our patients is one way we can continue to improve our services. Please take a few " minutes to complete the written survey that you may receive in the mail after your visit with us. Thank you!             Your Updated Medication List - Protect others around you: Learn how to safely use, store and throw away your medicines at www.disposemymeds.org.          This list is accurate as of 5/25/18 10:09 AM.  Always use your most recent med list.                   Brand Name Dispense Instructions for use Diagnosis    prenatal multivitamin plus iron 27-0.8 MG Tabs per tablet      Take 1 tablet by mouth daily

## 2018-06-01 ENCOUNTER — PRENATAL OFFICE VISIT (OUTPATIENT)
Dept: OBGYN | Facility: CLINIC | Age: 33
End: 2018-06-01
Payer: COMMERCIAL

## 2018-06-01 VITALS
TEMPERATURE: 98.6 F | BODY MASS INDEX: 34.84 KG/M2 | SYSTOLIC BLOOD PRESSURE: 122 MMHG | HEART RATE: 75 BPM | WEIGHT: 203 LBS | DIASTOLIC BLOOD PRESSURE: 80 MMHG

## 2018-06-01 DIAGNOSIS — B95.1 POSITIVE GBS TEST: ICD-10-CM

## 2018-06-01 DIAGNOSIS — Z33.3 PREGNANT STATE, GESTATIONAL CARRIER: Primary | ICD-10-CM

## 2018-06-01 PROCEDURE — 99207 ZZC PRENATAL VISIT: CPT | Performed by: OBSTETRICS & GYNECOLOGY

## 2018-06-01 NOTE — PROGRESS NOTES
38w2d  Irregular mild contractions, but nothing regular or painful.  No vaginal bleeding or leakage of fluid. + Fetal movement  Intended family coming to town on 6/5.  Very eager for baby to arrive.  Legal documentation in order.  GBS pos.  RTC weekly.  Marybel Monroe MD

## 2018-06-01 NOTE — MR AVS SNAPSHOT
"              After Visit Summary   6/1/2018    Ana Lilia Arrington    MRN: [id number]           Patient Information     Date Of Birth          1985        Visit Information        Provider Department      6/1/2018 3:30 PM Marybel Monroe MD [facility]        Today's Diagnoses     Pregnant state, gestational carrier    -  1    Positive GBS test           Follow-ups after your visit        Your next 10 appointments already scheduled     Jun 07, 2018  3:45 PM CDT   ESTABLISHED PRENATAL with Marybel Monroe MD   [facility] ([facility]    [address] [place] [address] [phone]              Who to contact     If you have questions or need follow up information about today's clinic visit or your schedule please contact [facility] directly at [phone].  Normal or non-critical lab and imaging results will be communicated to you by MyChart, letter or phone within 4 business days after the clinic has received the results. If you do not hear from us within 7 days, please contact the clinic through MyChart or phone. If you have a critical or abnormal lab result, we will notify you by phone as soon as possible.  Submit refill requests through Fighters or call your pharmacy and they will forward the refill request to us. Please allow 3 business days for your refill to be completed.          Additional Information About Your Visit        MyChart Information     Fighters lets you send messages to your doctor, view your test results, renew your prescriptions, schedule appointments and more. To sign up, go to [email or web address] . Click on \"Log in\" on the left side of the screen, which will take you to the Welcome page. Then click on \"Sign up Now\" on the right side of the page.     You will be asked to enter the access code listed below, as well as some personal information. Please follow the directions to create your " username and password.     Your access code is: [email or web address]  Expires: 2018  1:39 PM     Your access code will  in 90 days. If you need help or a new code, please call your [place] clinic or [phone].        Care EveryWhere ID     This is your Care EveryWhere ID. This could be used by other organizations to access your [place] medical records  [id number]        Your Vitals Were     Pulse Temperature BMI (Body Mass Index)             75 98.6  F (37  C) (Oral) 34.84 kg/m2          Blood Pressure from Last 3 Encounters:   18 122/80   18 122/77   18 126/78    Weight from Last 3 Encounters:   18 203 lb (92.1 kg)   18 202 lb 4.8 oz (91.8 kg)   18 203 lb (92.1 kg)              Today, you had the following     No orders found for display       Primary Care Provider Office Phone # Fax #    Gill SONU Perez [facility] [phone]       [address] [facility] [address]  [facility] [address]        Equal Access to Services     CARLITO ALAN : Hadii aad ku hadangelineo Soantonioali, waaxda luqadaha, qaybta kaalmada adeegyada, duane hewitt . So [facility] [phone].    ATENCIÓN: Si habla español, tiene a alberto disposición servicios gratuitos de asistencia lingüística. [facility] [phone].    We comply with applicable federal civil rights laws and [place] laws. We do not discriminate on the basis of race, color, national origin, age, disability, sex, sexual orientation, or gender identity.            Thank you!     Thank you for choosing [facility]  for your care. Our goal is always to provide you with excellent care. Hearing back from our patients is one way we can continue to improve our services. Please take a few minutes to complete the written survey that you may receive in the mail after your visit with us. Thank you!             Your Updated Medication List - Protect others around you: Learn how to safely use, store and throw away  your medicines at www.disposemymeds.org.          This list is accurate as of 6/1/18 11:59 PM.  Always use your most recent med list.                   Brand Name Dispense Instructions for use Diagnosis    prenatal multivitamin plus iron 27-0.8 MG Tabs per tablet      Take 1 tablet by mouth daily

## 2018-06-05 ENCOUNTER — HOSPITAL ENCOUNTER (OUTPATIENT)
Facility: CLINIC | Age: 33
End: 2018-06-05
Admitting: OBSTETRICS & GYNECOLOGY
Payer: COMMERCIAL

## 2018-06-07 ENCOUNTER — HOSPITAL ENCOUNTER (INPATIENT)
Facility: CLINIC | Age: 33
LOS: 3 days | Discharge: HOME HEALTH | End: 2018-06-10
Attending: OBSTETRICS & GYNECOLOGY | Admitting: OBSTETRICS & GYNECOLOGY
Payer: COMMERCIAL

## 2018-06-07 ENCOUNTER — PRENATAL OFFICE VISIT (OUTPATIENT)
Dept: OBGYN | Facility: CLINIC | Age: 33
End: 2018-06-07
Payer: COMMERCIAL

## 2018-06-07 VITALS
DIASTOLIC BLOOD PRESSURE: 103 MMHG | HEART RATE: 68 BPM | BODY MASS INDEX: 34.67 KG/M2 | TEMPERATURE: 98.4 F | SYSTOLIC BLOOD PRESSURE: 166 MMHG | WEIGHT: 202 LBS

## 2018-06-07 DIAGNOSIS — Z33.3 PREGNANT STATE, GESTATIONAL CARRIER: Primary | ICD-10-CM

## 2018-06-07 DIAGNOSIS — O14.93 PRE-ECLAMPSIA IN THIRD TRIMESTER: ICD-10-CM

## 2018-06-07 DIAGNOSIS — B95.1 POSITIVE GBS TEST: ICD-10-CM

## 2018-06-07 LAB
ABO + RH BLD: NORMAL
ABO + RH BLD: NORMAL
ALT SERPL W P-5'-P-CCNC: 14 U/L (ref 0–50)
AST SERPL W P-5'-P-CCNC: 23 U/L (ref 0–45)
BASOPHILS # BLD AUTO: 0 10E9/L (ref 0–0.2)
BASOPHILS NFR BLD AUTO: 0.2 %
BLD GP AB SCN SERPL QL: NORMAL
BLOOD BANK CMNT PATIENT-IMP: NORMAL
CREAT SERPL-MCNC: 0.68 MG/DL (ref 0.52–1.04)
CREAT UR-MCNC: 186 MG/DL
DIFFERENTIAL METHOD BLD: ABNORMAL
EOSINOPHIL # BLD AUTO: 0.1 10E9/L (ref 0–0.7)
EOSINOPHIL NFR BLD AUTO: 1.1 %
ERYTHROCYTE [DISTWIDTH] IN BLOOD BY AUTOMATED COUNT: 15.4 % (ref 10–15)
GFR SERPL CREATININE-BSD FRML MDRD: >90 ML/MIN/1.7M2
HCT VFR BLD AUTO: 37.7 % (ref 35–47)
HGB BLD-MCNC: 11.9 G/DL (ref 11.7–15.7)
IMM GRANULOCYTES # BLD: 0 10E9/L (ref 0–0.4)
IMM GRANULOCYTES NFR BLD: 0.2 %
LYMPHOCYTES # BLD AUTO: 2.7 10E9/L (ref 0.8–5.3)
LYMPHOCYTES NFR BLD AUTO: 32.9 %
MCH RBC QN AUTO: 25.7 PG (ref 26.5–33)
MCHC RBC AUTO-ENTMCNC: 31.6 G/DL (ref 31.5–36.5)
MCV RBC AUTO: 81 FL (ref 78–100)
MONOCYTES # BLD AUTO: 0.6 10E9/L (ref 0–1.3)
MONOCYTES NFR BLD AUTO: 7.7 %
NEUTROPHILS # BLD AUTO: 4.8 10E9/L (ref 1.6–8.3)
NEUTROPHILS NFR BLD AUTO: 57.9 %
NRBC # BLD AUTO: 0 10*3/UL
NRBC BLD AUTO-RTO: 0 /100
PLATELET # BLD AUTO: 248 10E9/L (ref 150–450)
PROT UR-MCNC: 3.23 G/L
PROT/CREAT 24H UR: 1.74 G/G CR (ref 0–0.2)
RBC # BLD AUTO: 4.63 10E12/L (ref 3.8–5.2)
SPECIMEN EXP DATE BLD: NORMAL
WBC # BLD AUTO: 8.3 10E9/L (ref 4–11)

## 2018-06-07 PROCEDURE — 36415 COLL VENOUS BLD VENIPUNCTURE: CPT | Performed by: STUDENT IN AN ORGANIZED HEALTH CARE EDUCATION/TRAINING PROGRAM

## 2018-06-07 PROCEDURE — 82565 ASSAY OF CREATININE: CPT | Performed by: STUDENT IN AN ORGANIZED HEALTH CARE EDUCATION/TRAINING PROGRAM

## 2018-06-07 PROCEDURE — 85025 COMPLETE CBC W/AUTO DIFF WBC: CPT | Performed by: STUDENT IN AN ORGANIZED HEALTH CARE EDUCATION/TRAINING PROGRAM

## 2018-06-07 PROCEDURE — 86900 BLOOD TYPING SEROLOGIC ABO: CPT | Performed by: OBSTETRICS & GYNECOLOGY

## 2018-06-07 PROCEDURE — 25000128 H RX IP 250 OP 636: Performed by: STUDENT IN AN ORGANIZED HEALTH CARE EDUCATION/TRAINING PROGRAM

## 2018-06-07 PROCEDURE — 99207 ZZC PRENATAL VISIT: CPT | Performed by: OBSTETRICS & GYNECOLOGY

## 2018-06-07 PROCEDURE — 84156 ASSAY OF PROTEIN URINE: CPT | Performed by: STUDENT IN AN ORGANIZED HEALTH CARE EDUCATION/TRAINING PROGRAM

## 2018-06-07 PROCEDURE — 86780 TREPONEMA PALLIDUM: CPT | Performed by: STUDENT IN AN ORGANIZED HEALTH CARE EDUCATION/TRAINING PROGRAM

## 2018-06-07 PROCEDURE — 84460 ALANINE AMINO (ALT) (SGPT): CPT | Performed by: STUDENT IN AN ORGANIZED HEALTH CARE EDUCATION/TRAINING PROGRAM

## 2018-06-07 PROCEDURE — 86901 BLOOD TYPING SEROLOGIC RH(D): CPT | Performed by: OBSTETRICS & GYNECOLOGY

## 2018-06-07 PROCEDURE — 12000028 ZZH R&B OB UMMC

## 2018-06-07 PROCEDURE — 25000132 ZZH RX MED GY IP 250 OP 250 PS 637: Performed by: OBSTETRICS & GYNECOLOGY

## 2018-06-07 PROCEDURE — 86850 RBC ANTIBODY SCREEN: CPT | Performed by: OBSTETRICS & GYNECOLOGY

## 2018-06-07 PROCEDURE — 84450 TRANSFERASE (AST) (SGOT): CPT | Performed by: STUDENT IN AN ORGANIZED HEALTH CARE EDUCATION/TRAINING PROGRAM

## 2018-06-07 RX ORDER — OXYTOCIN/0.9 % SODIUM CHLORIDE 30/500 ML
100-340 PLASTIC BAG, INJECTION (ML) INTRAVENOUS CONTINUOUS PRN
Status: COMPLETED | OUTPATIENT
Start: 2018-06-07 | End: 2018-06-08

## 2018-06-07 RX ORDER — CARBOPROST TROMETHAMINE 250 UG/ML
250 INJECTION, SOLUTION INTRAMUSCULAR
Status: DISCONTINUED | OUTPATIENT
Start: 2018-06-07 | End: 2018-06-08

## 2018-06-07 RX ORDER — METHYLERGONOVINE MALEATE 0.2 MG/ML
200 INJECTION INTRAVENOUS
Status: DISCONTINUED | OUTPATIENT
Start: 2018-06-07 | End: 2018-06-08

## 2018-06-07 RX ORDER — NIFEDIPINE 10 MG/1
10 CAPSULE ORAL
Status: DISCONTINUED | OUTPATIENT
Start: 2018-06-07 | End: 2018-06-08

## 2018-06-07 RX ORDER — ONDANSETRON 2 MG/ML
4 INJECTION INTRAMUSCULAR; INTRAVENOUS EVERY 6 HOURS PRN
Status: DISCONTINUED | OUTPATIENT
Start: 2018-06-07 | End: 2018-06-08

## 2018-06-07 RX ORDER — NALOXONE HYDROCHLORIDE 0.4 MG/ML
.1-.4 INJECTION, SOLUTION INTRAMUSCULAR; INTRAVENOUS; SUBCUTANEOUS
Status: DISCONTINUED | OUTPATIENT
Start: 2018-06-07 | End: 2018-06-08

## 2018-06-07 RX ORDER — IBUPROFEN 800 MG/1
800 TABLET, FILM COATED ORAL
Status: DISCONTINUED | OUTPATIENT
Start: 2018-06-07 | End: 2018-06-08

## 2018-06-07 RX ORDER — MAGNESIUM SULFATE IN WATER 40 MG/ML
2 INJECTION, SOLUTION INTRAVENOUS CONTINUOUS
Status: DISCONTINUED | OUTPATIENT
Start: 2018-06-07 | End: 2018-06-08

## 2018-06-07 RX ORDER — HYDRALAZINE HYDROCHLORIDE 20 MG/ML
5 INJECTION INTRAMUSCULAR; INTRAVENOUS
Status: DISCONTINUED | OUTPATIENT
Start: 2018-06-07 | End: 2018-06-08

## 2018-06-07 RX ORDER — HYDRALAZINE HYDROCHLORIDE 20 MG/ML
10 INJECTION INTRAMUSCULAR; INTRAVENOUS
Status: DISCONTINUED | OUTPATIENT
Start: 2018-06-07 | End: 2018-06-08

## 2018-06-07 RX ORDER — CALCIUM GLUCONATE 94 MG/ML
1 INJECTION, SOLUTION INTRAVENOUS
Status: DISCONTINUED | OUTPATIENT
Start: 2018-06-07 | End: 2018-06-08

## 2018-06-07 RX ORDER — MAGNESIUM SULFATE HEPTAHYDRATE 500 MG/ML
4 INJECTION, SOLUTION INTRAMUSCULAR; INTRAVENOUS
Status: DISCONTINUED | OUTPATIENT
Start: 2018-06-07 | End: 2018-06-08

## 2018-06-07 RX ORDER — SODIUM CHLORIDE, SODIUM LACTATE, POTASSIUM CHLORIDE, CALCIUM CHLORIDE 600; 310; 30; 20 MG/100ML; MG/100ML; MG/100ML; MG/100ML
10-125 INJECTION, SOLUTION INTRAVENOUS CONTINUOUS
Status: DISCONTINUED | OUTPATIENT
Start: 2018-06-07 | End: 2018-06-08

## 2018-06-07 RX ORDER — ACETAMINOPHEN 325 MG/1
650 TABLET ORAL EVERY 4 HOURS PRN
Status: DISCONTINUED | OUTPATIENT
Start: 2018-06-07 | End: 2018-06-08

## 2018-06-07 RX ORDER — MAGNESIUM SULFATE HEPTAHYDRATE 40 MG/ML
4 INJECTION, SOLUTION INTRAVENOUS
Status: DISCONTINUED | OUTPATIENT
Start: 2018-06-07 | End: 2018-06-08

## 2018-06-07 RX ORDER — OXYTOCIN 10 [USP'U]/ML
10 INJECTION, SOLUTION INTRAMUSCULAR; INTRAVENOUS
Status: DISCONTINUED | OUTPATIENT
Start: 2018-06-07 | End: 2018-06-08

## 2018-06-07 RX ORDER — LORAZEPAM 2 MG/ML
2 INJECTION INTRAMUSCULAR
Status: DISCONTINUED | OUTPATIENT
Start: 2018-06-07 | End: 2018-06-08

## 2018-06-07 RX ORDER — FENTANYL CITRATE 50 UG/ML
50-100 INJECTION, SOLUTION INTRAMUSCULAR; INTRAVENOUS
Status: DISCONTINUED | OUTPATIENT
Start: 2018-06-07 | End: 2018-06-08

## 2018-06-07 RX ORDER — OXYCODONE AND ACETAMINOPHEN 5; 325 MG/1; MG/1
1 TABLET ORAL
Status: DISCONTINUED | OUTPATIENT
Start: 2018-06-07 | End: 2018-06-08

## 2018-06-07 RX ORDER — MAGNESIUM SULFATE HEPTAHYDRATE 40 MG/ML
4 INJECTION, SOLUTION INTRAVENOUS ONCE
Status: COMPLETED | OUTPATIENT
Start: 2018-06-07 | End: 2018-06-07

## 2018-06-07 RX ORDER — SODIUM CHLORIDE, SODIUM LACTATE, POTASSIUM CHLORIDE, CALCIUM CHLORIDE 600; 310; 30; 20 MG/100ML; MG/100ML; MG/100ML; MG/100ML
INJECTION, SOLUTION INTRAVENOUS CONTINUOUS
Status: DISCONTINUED | OUTPATIENT
Start: 2018-06-07 | End: 2018-06-08

## 2018-06-07 RX ADMIN — ACETAMINOPHEN 650 MG: 325 TABLET, FILM COATED ORAL at 19:36

## 2018-06-07 RX ADMIN — MAGNESIUM SULFATE HEPTAHYDRATE 4 G: 40 INJECTION, SOLUTION INTRAVENOUS at 17:48

## 2018-06-07 RX ADMIN — SODIUM CHLORIDE, POTASSIUM CHLORIDE, SODIUM LACTATE AND CALCIUM CHLORIDE 75 ML/HR: 600; 310; 30; 20 INJECTION, SOLUTION INTRAVENOUS at 17:56

## 2018-06-07 RX ADMIN — MAGNESIUM SULFATE IN WATER 2 G/HR: 40 INJECTION, SOLUTION INTRAVENOUS at 18:35

## 2018-06-07 NOTE — IP AVS SNAPSHOT
[facility]    [address] [address]    Phone:  [phone]                                       After Visit Summary   6/7/2018    Ana Lilia Arrington    MRN: [id number]           After Visit Summary Signature Page     I have received my discharge instructions, and my questions have been answered. I have discussed any challenges I see with this plan with the nurse or doctor.    ..........................................................................................................................................  Patient/Patient Representative Signature      ..........................................................................................................................................  Patient Representative Print Name and Relationship to Patient    ..................................................               ................................................  Date                                            Time    ..........................................................................................................................................  Reviewed by Signature/Title    ...................................................              ..............................................  Date                                                            Time

## 2018-06-07 NOTE — H&P
[facility] Labor and Delivery History and Physical    Ana Lilia Arrington MRN# [id number]   Age: 33 year old YOB: 1985     Date of Admission:  2018    Primary care provider: Gill Verma    CC: IOL for Pre-Eclampsia with Severe Features     HPI: Ana Lilia Arrington is a 33 year old  at 39w1d by LMP c/w 7w5d US who presents for IOL d/t Pre-Eclampsia with Severe Features. Denies vaginal bleeding or loss of fluid. Denies visual changes. Does admit headache. Notes good fetal movement.    Pregnancy complicated by:  1. Pre-Eclampsia with Severe Features   2. GBS positive     ROS:   Complete 10-point ROS negative except as noted in HPI.She admits headache, denies blurry vision, chest pain, shortness of breath, RUQ pain, nausea, vomiting, dysuria, hematuria or extremity edema.     Pregnancy History:  OBSTETRIC HISTORY:    Obstetric History       T3      L3     SAB0   TAB0   Ectopic0   Multiple0   Live Births3       # Outcome Date GA Lbr Amyank/2nd Weight Sex Delivery Anes PTL Lv   4 Current            3 Term 09/12/10 39w0d 06:00 3.799 kg (8 lb 6 oz) M    MICHELINE      Name: radha   2 Term 08 40w2d 03:00 3.175 kg (7 lb) F    MICHELINE      Name: Marcella   1 Term 06 40w0d 05:00 2.722 kg (6 lb) F    MICHELINE      Name: Estephanie          Prenatal Labs:   Lab Results   Component Value Date    ABO PENDING 2018    RH Pos 2017    AS PENDING 2018    HEPBANG Nonreactive 2017    CHPCRT Negative 2017    GCPCRT Negative 2017    TREPAB Negative 2017    RUBELLAABIGG >500  Interpretation:  Positive, Immune 2010    HGB 11.9 2018    HIV Negative 2010       GBS Status:   Lab Results   Component Value Date    GBS Positive (A) 2018       Active Problem List  Patient Active Problem List   Diagnosis     Plans ESSURE postpartum     CARDIOVASCULAR SCREENING; LDL GOAL LESS THAN 160     Migraine     Need  for Tdap vaccination     Pregnant state, gestational carrier     Positive GBS test       Medication Prior to Admission  Prescriptions Prior to Admission   Medication Sig Dispense Refill Last Dose     Prenatal Vit-Fe Fumarate-FA (PRENATAL MULTIVITAMIN PLUS IRON) 27-0.8 MG TABS per tablet Take 1 tablet by mouth daily   Taking   .     Medical History  Past Medical History:   Diagnosis Date     NO ACTIVE PROBLEMS        Surgical History  Past Surgical History:   Procedure Laterality Date     NO HISTORY OF SURGERY             Family History  Family History   Problem Relation Age of Onset     Family History Negative No family hx of        Social History:  Lives with . Is gestational carrier for couple from [place], who are with her for labor. Denies tobacco, ETOH or drug use during pregnancy.    Physical Exam:    Gen: Well appearing, no apparent distress  CV: RRR, no murmurs  Pulm: CTAB, no wheezes   GI: gravid, soft, nontender. EFW 3500  Cervix: deferred at this time. in office 1 hour ago   Presentation:Cephalic by BSUS    Fetal Heart Rate Tracing: Baseline 30, moderate variability, accels present, no decels  Tocometer: 0 contractions in 10 minutes    Imaging:    Dating Ultrasound   GA: 7w5d      Single IUP, +Fetal cardiac activity   Fetal Anatomy Survey   GA: 18w2d      Single IUP, Fetal Anatomy WNL, 3 VC, Placenta: posterior     Assessment:  Ana Lilia Arrington is a 33 year old  at 39w1d by LMP c/w 7w5d US admitted for IOL for Pre-E with SF.    Plan:    #Labor  - Admit to L&D  - Plans pending for labor analgesia  - GBS positive, PCN when in active labor or ROM.   - Plans for IOL with miso after moves to L&D.    # Pre Eclampsia with Severe features  - hydralazine for SBP > 160  - Starting magnesium ggt  - Mag checks q4h   - HELLP labs: AST/ALT WNL, Cr WNL, Plts WNL, UPC pending.     #Fetal Wellbeing  -EFW   -Category 1, reactive.  Continue monitoring per protocol    #pain assessment  - pt has no pain at  this time     Jensen Olson DO, MA  PGY-1   2018     Physician Attestation   I, Mariya Singer, personally examined and evaluated this patient.  I discussed the patient with the care team, and agree with the assessment and plan of care as documented in the note of 2018  [date].      I personally reviewed vital signs, medications and labs and fetal heart tracing.     Key findings: patient is a  at 39w2d who had severe range BP's in clinic today x 2.  She was sent here for induction but d/t closed unit, we can only begin magnesium for now and wait until we have appropriate staffing to start induction. Patient denies any HA or visual changes at the current time.  Her BP's on antepartum have improved with only rare BP in severe range, while resting here.  will monitor tonight and begin induction tomorrow.    Mariya Singer MD  Date of Service (when I saw the patient): 18

## 2018-06-07 NOTE — IP AVS SNAPSHOT
MRN:[id number]                      After Visit Summary   6/7/2018    Ana Lilia Arrington    MRN: [id number]           Thank you!     Thank you for choosing [facility] for your care. Our goal is always to provide you with excellent care. Hearing back from our patients is one way we can continue to improve our services. Please take a few minutes to complete the written survey that you may receive in the mail after you visit with us. Thank you!        Patient Information     Date Of Birth          1985        About your hospital stay     You were [facility] on:  June 7, 2018 [facility]    You were discharged on:  Estela 10, 2018        Reason for your hospital stay       Maternity care                  Who to Call     For medical emergencies, please call 911.  For non-urgent questions about your medical care, please call your primary care provider or clinic, [phone]          Attending Provider     Provider Specialty    Mariya Singer MD OB/Gyn    Mabel, Marybel Merlos MD OB/Gyn    Rhea, Terrie Lawrence MD OB/Gyn       Primary Care Provider Office Phone # Fax #    [facility] [phone]      After Care Instructions     Activity       Review discharge instructions            Diet       Resume previous diet            Discharge Instructions       Call the clinic or return to the ED if you have any of the following:    - Temperature greater than 100.4F  - Pain not controlled by pain medications  - any symptoms or preeclampsia, including: Severe headache, visual changes, chest pain, shortness of breath, pain in the upper right abdomen, or sudden increase in swelling  - Uncontrolled nausea/vomiting  - Foul-smelling vaginal discharge  - Vaginal bleeding soaking 1 pad per hour for 2 hours in a row            Discharge Instructions - Hypertensive disorders patients       High Blood pressure patients to follow up in clinic or via home  care within one week for a blood pressure check            Discharge Instructions - Postpartum visit       Schedule postpartum visit with your provider and return to clinic in 6 weeks.                  Further instructions from your care team       Postpartum Vaginal Delivery Instructions    Activity       Ask family and friends for help when you need it.    Do not place anything in your vagina for 6 weeks.    You are not restricted on other activities, but take it easy for a few weeks to allow your body to recover from delivery.  You are able to do any activities you feel up to that point.    No driving until you have stopped taking your pain medications (usually two weeks after delivery).     Call your health care provider if you have any of these symptoms:       Increased pain, swelling, redness, or fluid around your stiches from an episiotomy or perineal tear.    A fever above 100.4 F (38 C) with or without chills when placing a thermometer under your tongue.    You soak a sanitary pad with blood within 1 hour, or you see blood clots larger than a golf ball.    Bleeding that lasts more than 6 weeks.    Vaginal discharge that smells bad.    Severe pain, cramping or tenderness in your lower belly area.    A need to urinate more frequently (use the toilet more often), more urgently (use the toilet very quickly), or it burns when you urinate.    Nausea and vomiting.    Redness, swelling or pain around a vein in your leg.    Problems breastfeeding or a red or painful area on your breast.    Chest pain and cough or are gasping for air.    Problems coping with sadness, anxiety, or depression.  If you have any concerns about hurting yourself or the baby, call your provider immediately.     You have questions or concerns after you return home.     Keep your hands clean:  Always wash your hands before touching your perineal area and stitches.  This helps reduce your risk of infection.  If your hands aren't dirty, you may  "use an alcohol hand-rub to clean your hands. Keep your nails clean and short.        Pending Results     No orders found from 2018 to 2018.            Statement of Approval     Ordered          06/10/18 0924  I have reviewed and agree with all the recommendations and orders detailed in this document.  EFFECTIVE NOW     Approved and electronically signed by:  Terrie Wilkerson MD             Admission Information     Date & Time Provider Department Dept. Phone    2018 Terrie Wilkerson MD [facility] [phone]      Your Vitals Were     Blood Pressure Pulse Temperature Respirations Height Weight    134/92 75 98.6  F (37  C) (Oral) 18 1.626 m (5' 4\") 87.5 kg (193 lb)    Pulse Oximetry BMI (Body Mass Index)                99% 33.13 kg/m2          MyChart Information     [facility] lets you send messages to your doctor, view your test results, renew your prescriptions, schedule appointments and more. To sign up, go to [email or web address] . Click on \"Log in\" on the left side of the screen, which will take you to the Welcome page. Then click on \"Sign up Now\" on the right side of the page.     You will be asked to enter the access code listed below, as well as some personal information. Please follow the directions to create your username and password.     Your access code is: [email or web address]  Expires: 2018  1:39 PM     Your access code will  in 90 days. If you need help or a new code, please call your [facility] clinic or [phone].        Care EveryWhere ID     This is your Care EveryWhere ID. This could be used by other organizations to access your [facility] medical records  [id number]        Equal Access to Services     CARLITO ALAN : Julito Worrell, sylvia magallanes, tao traylor, duane munoz. So [facility] [phone].    ATENCIÓN: Si habla español, tiene a alberto disposición servicios gratuitos de asistencia lingüística. Llame al " [phone].    We comply with applicable federal civil rights laws and [place] laws. We do not discriminate on the basis of race, color, national origin, age, disability, sex, sexual orientation, or gender identity.               Review of your medicines      START taking        Dose / Directions    acetaminophen 325 MG tablet   Commonly known as:  TYLENOL        Dose:  650 mg   Take 2 tablets (650 mg) by mouth every 4 hours as needed for mild pain or fever (greater than or equal to 38? C /100.4? F (oral) or 38.5? C/ 101.4? F (core).)   Quantity:  40 tablet   Refills:  0       ibuprofen 600 MG tablet   Commonly known as:  ADVIL/MOTRIN        Dose:  600 mg   Take 1 tablet (600 mg) by mouth every 6 hours as needed for moderate pain   Quantity:  30 tablet   Refills:  1         STOP taking     prenatal multivitamin plus iron 27-0.8 MG Tabs per tablet                Where to get your medicines      Some of these will need a paper prescription and others can be bought over the counter. Ask your nurse if you have questions.     You don't need a prescription for these medications     acetaminophen 325 MG tablet    ibuprofen 600 MG tablet                Protect others around you: Learn how to safely use, store and throw away your medicines at www.disposemymeds.org.             Medication List: This is a list of all your medications and when to take them. Check marks below indicate your daily home schedule. Keep this list as a reference.      Medications           Morning Afternoon Evening Bedtime As Needed    acetaminophen 325 MG tablet   Commonly known as:  TYLENOL   Take 2 tablets (650 mg) by mouth every 4 hours as needed for mild pain or fever (greater than or equal to 38? C /100.4? F (oral) or 38.5? C/ 101.4? F (core).)   Last time this was given:  650 mg on 6/8/2018  9:57 AM                                ibuprofen 600 MG tablet   Commonly known as:  ADVIL/MOTRIN   Take 1 tablet (600 mg) by mouth every 6 hours as  needed for moderate pain

## 2018-06-07 NOTE — PROGRESS NOTES
39w1d  Elevated BP today, 166/103 with repeat 160/100.  Also reports HA that started yesterday.  Tried to rest and wait for it to go away, but it did not.  Has been unrelenting since it came.  Discussed that based on her BP today and this HA, she has pre-eclampsia and I would recommend delivery.  Called L&D, but unfortunately L&D is full.  Given severe range BP and the fact that Ana Lilia is a gestational carrier (requiring much legal documentation for delivery) the patient was accepted to come to antepartum.  Plan for magnesium and anti-hypertensive treatment prn until staffing is adequate to start induction.  Clinic MA wheeled Ana Lilia over to labor and delivery following our visit today.  RTC post-partum.  Marybel Monroe MD

## 2018-06-07 NOTE — Clinical Note
This is the patient I'm sending over to you.  Super sweet.  Met the intended mom today for the first time.  She was very sweet and supportive as well.  Thanks for caring for Iris.  Hopefully they'll be able to start her induction sooner rather than later.  Marybel

## 2018-06-07 NOTE — MR AVS SNAPSHOT
"              After Visit Summary   2018    Ana Lilia Arrington    MRN: [id number]           Patient Information     Date Of Birth          1985        Visit Information        Provider Department      2018 3:45 PM Marybel Monroe MD [facility]        Today's Diagnoses     Pregnant state, gestational carrier    -  1    Pre-eclampsia in third trimester        Positive GBS test           Follow-ups after your visit        Who to contact     If you have questions or need follow up information about today's clinic visit or your schedule please contact [facility] directly at [phone].  Normal or non-critical lab and imaging results will be communicated to you by [employer], letter or phone within 4 business days after the clinic has received the results. If you do not hear from us within 7 days, please contact the clinic through [employer] or phone. If you have a critical or abnormal lab result, we will notify you by phone as soon as possible.  Submit refill requests through [employer] or call your pharmacy and they will forward the refill request to us. Please allow 3 business days for your refill to be completed.          Additional Information About Your Visit        MyChart Information     [employer] lets you send messages to your doctor, view your test results, renew your prescriptions, schedule appointments and more. To sign up, go to [email or web address] . Click on \"Log in\" on the left side of the screen, which will take you to the Welcome page. Then click on \"Sign up Now\" on the right side of the page.     You will be asked to enter the access code listed below, as well as some personal information. Please follow the directions to create your username and password.     Your access code is: [email or web address]  Expires: 2018  1:39 PM     Your access code will  in 90 days. If you need help or a new code, please call your [facility] or [phone].        Care " EveryWhere ID     This is your Care EveryWhere ID. This could be used by other organizations to access your [facility] medical records  [id number]        Your Vitals Were     Pulse Temperature BMI (Body Mass Index)             68 98.4  F (36.9  C) (Oral) 34.67 kg/m2          Blood Pressure from Last 3 Encounters:   06/07/18 (!) 166/103   06/01/18 122/80   05/25/18 122/77    Weight from Last 3 Encounters:   06/07/18 202 lb (91.6 kg)   06/01/18 203 lb (92.1 kg)   05/25/18 202 lb 4.8 oz (91.8 kg)              Today, you had the following     No orders found for display       Primary Care Provider Office Phone # Fax #    [facility] [phone]       [address] [facility] [address]  [facility] [address]        Equal Access to Services     [facility]: Hadii aad ku hadasho Sorachel, waaxda luqadaha, qaybta kaalmada adeegyada, duane mcarthur haymarianne hewitt . So [facility] [phone].    ATENCIÓN: Si habla español, tiene a alberto disposición servicios gratuitos de asistencia lingüística. Llame al [phone].    We comply with applicable federal civil rights laws and [place] laws. We do not discriminate on the basis of race, color, national origin, age, disability, sex, sexual orientation, or gender identity.            Thank you!     Thank you for choosing [facility]  for your care. Our goal is always to provide you with excellent care. Hearing back from our patients is one way we can continue to improve our services. Please take a few minutes to complete the written survey that you may receive in the mail after your visit with us. Thank you!             Your Updated Medication List - Protect others around you: Learn how to safely use, store and throw away your medicines at www.disposemymeds.org.          This list is accurate as of 6/7/18  4:38 PM.  Always use your most recent med list.                   Brand Name Dispense Instructions for use Diagnosis    prenatal  multivitamin plus iron 27-0.8 MG Tabs per tablet      Take 1 tablet by mouth daily

## 2018-06-08 ENCOUNTER — ANESTHESIA EVENT (OUTPATIENT)
Dept: OBGYN | Facility: CLINIC | Age: 33
End: 2018-06-08
Payer: COMMERCIAL

## 2018-06-08 ENCOUNTER — ANESTHESIA (OUTPATIENT)
Dept: OBGYN | Facility: CLINIC | Age: 33
End: 2018-06-08
Payer: COMMERCIAL

## 2018-06-08 LAB
ALT SERPL W P-5'-P-CCNC: 14 U/L (ref 0–50)
AST SERPL W P-5'-P-CCNC: 20 U/L (ref 0–45)
CREAT SERPL-MCNC: 0.64 MG/DL (ref 0.52–1.04)
ERYTHROCYTE [DISTWIDTH] IN BLOOD BY AUTOMATED COUNT: 15.6 % (ref 10–15)
GFR SERPL CREATININE-BSD FRML MDRD: >90 ML/MIN/1.7M2
HCT VFR BLD AUTO: 36.6 % (ref 35–47)
HGB BLD-MCNC: 11.8 G/DL (ref 11.7–15.7)
MCH RBC QN AUTO: 26.2 PG (ref 26.5–33)
MCHC RBC AUTO-ENTMCNC: 32.2 G/DL (ref 31.5–36.5)
MCV RBC AUTO: 81 FL (ref 78–100)
PLATELET # BLD AUTO: 244 10E9/L (ref 150–450)
RBC # BLD AUTO: 4.51 10E12/L (ref 3.8–5.2)
T PALLIDUM AB SER QL: NONREACTIVE
WBC # BLD AUTO: 8.8 10E9/L (ref 4–11)

## 2018-06-08 PROCEDURE — 84460 ALANINE AMINO (ALT) (SGPT): CPT | Performed by: STUDENT IN AN ORGANIZED HEALTH CARE EDUCATION/TRAINING PROGRAM

## 2018-06-08 PROCEDURE — 0HQ9XZZ REPAIR PERINEUM SKIN, EXTERNAL APPROACH: ICD-10-PCS | Performed by: OBSTETRICS & GYNECOLOGY

## 2018-06-08 PROCEDURE — 25000125 ZZHC RX 250: Performed by: OBSTETRICS & GYNECOLOGY

## 2018-06-08 PROCEDURE — 59400 OBSTETRICAL CARE: CPT | Performed by: OBSTETRICS & GYNECOLOGY

## 2018-06-08 PROCEDURE — 00HU33Z INSERTION OF INFUSION DEVICE INTO SPINAL CANAL, PERCUTANEOUS APPROACH: ICD-10-PCS | Performed by: ANESTHESIOLOGY

## 2018-06-08 PROCEDURE — 3E0R3BZ INTRODUCTION OF ANESTHETIC AGENT INTO SPINAL CANAL, PERCUTANEOUS APPROACH: ICD-10-PCS | Performed by: ANESTHESIOLOGY

## 2018-06-08 PROCEDURE — 82565 ASSAY OF CREATININE: CPT | Performed by: STUDENT IN AN ORGANIZED HEALTH CARE EDUCATION/TRAINING PROGRAM

## 2018-06-08 PROCEDURE — 25000128 H RX IP 250 OP 636: Performed by: ANESTHESIOLOGY

## 2018-06-08 PROCEDURE — 25000125 ZZHC RX 250

## 2018-06-08 PROCEDURE — 12000028 ZZH R&B OB UMMC

## 2018-06-08 PROCEDURE — 10907ZC DRAINAGE OF AMNIOTIC FLUID, THERAPEUTIC FROM PRODUCTS OF CONCEPTION, VIA NATURAL OR ARTIFICIAL OPENING: ICD-10-PCS | Performed by: OBSTETRICS & GYNECOLOGY

## 2018-06-08 PROCEDURE — 36415 COLL VENOUS BLD VENIPUNCTURE: CPT | Performed by: STUDENT IN AN ORGANIZED HEALTH CARE EDUCATION/TRAINING PROGRAM

## 2018-06-08 PROCEDURE — 25000125 ZZHC RX 250: Performed by: ANESTHESIOLOGY

## 2018-06-08 PROCEDURE — 72200001 ZZH LABOR CARE VAGINAL DELIVERY SINGLE

## 2018-06-08 PROCEDURE — 84450 TRANSFERASE (AST) (SGOT): CPT | Performed by: STUDENT IN AN ORGANIZED HEALTH CARE EDUCATION/TRAINING PROGRAM

## 2018-06-08 PROCEDURE — 25000132 ZZH RX MED GY IP 250 OP 250 PS 637: Performed by: OBSTETRICS & GYNECOLOGY

## 2018-06-08 PROCEDURE — 25000128 H RX IP 250 OP 636: Performed by: STUDENT IN AN ORGANIZED HEALTH CARE EDUCATION/TRAINING PROGRAM

## 2018-06-08 PROCEDURE — 85027 COMPLETE CBC AUTOMATED: CPT | Performed by: STUDENT IN AN ORGANIZED HEALTH CARE EDUCATION/TRAINING PROGRAM

## 2018-06-08 PROCEDURE — 25000128 H RX IP 250 OP 636: Performed by: OBSTETRICS & GYNECOLOGY

## 2018-06-08 PROCEDURE — 3E033VJ INTRODUCTION OF OTHER HORMONE INTO PERIPHERAL VEIN, PERCUTANEOUS APPROACH: ICD-10-PCS | Performed by: OBSTETRICS & GYNECOLOGY

## 2018-06-08 RX ORDER — NALOXONE HYDROCHLORIDE 0.4 MG/ML
.1-.4 INJECTION, SOLUTION INTRAMUSCULAR; INTRAVENOUS; SUBCUTANEOUS
Status: DISCONTINUED | OUTPATIENT
Start: 2018-06-08 | End: 2018-06-10 | Stop reason: HOSPADM

## 2018-06-08 RX ORDER — LABETALOL HYDROCHLORIDE 5 MG/ML
20 INJECTION, SOLUTION INTRAVENOUS EVERY 10 MIN PRN
Status: DISCONTINUED | OUTPATIENT
Start: 2018-06-08 | End: 2018-06-10 | Stop reason: HOSPADM

## 2018-06-08 RX ORDER — LIDOCAINE HYDROCHLORIDE AND EPINEPHRINE 15; 5 MG/ML; UG/ML
INJECTION, SOLUTION EPIDURAL PRN
Status: DISCONTINUED | OUTPATIENT
Start: 2018-06-08 | End: 2018-12-11 | Stop reason: HOSPADM

## 2018-06-08 RX ORDER — OXYTOCIN/0.9 % SODIUM CHLORIDE 30/500 ML
100 PLASTIC BAG, INJECTION (ML) INTRAVENOUS CONTINUOUS
Status: DISCONTINUED | OUTPATIENT
Start: 2018-06-08 | End: 2018-06-10 | Stop reason: HOSPADM

## 2018-06-08 RX ORDER — OXYCODONE HYDROCHLORIDE 5 MG/1
5 TABLET ORAL EVERY 4 HOURS PRN
Status: DISCONTINUED | OUTPATIENT
Start: 2018-06-08 | End: 2018-06-10 | Stop reason: HOSPADM

## 2018-06-08 RX ORDER — LORAZEPAM 2 MG/ML
2 INJECTION INTRAMUSCULAR
Status: DISCONTINUED | OUTPATIENT
Start: 2018-06-08 | End: 2018-06-10 | Stop reason: HOSPADM

## 2018-06-08 RX ORDER — LIDOCAINE HYDROCHLORIDE 10 MG/ML
INJECTION, SOLUTION INFILTRATION; PERINEURAL
Status: DISCONTINUED
Start: 2018-06-08 | End: 2018-06-08 | Stop reason: HOSPADM

## 2018-06-08 RX ORDER — OXYTOCIN 10 [USP'U]/ML
INJECTION, SOLUTION INTRAMUSCULAR; INTRAVENOUS
Status: DISCONTINUED
Start: 2018-06-08 | End: 2018-06-08 | Stop reason: HOSPADM

## 2018-06-08 RX ORDER — OXYTOCIN/0.9 % SODIUM CHLORIDE 30/500 ML
340 PLASTIC BAG, INJECTION (ML) INTRAVENOUS CONTINUOUS PRN
Status: DISCONTINUED | OUTPATIENT
Start: 2018-06-08 | End: 2018-06-10 | Stop reason: HOSPADM

## 2018-06-08 RX ORDER — LANOLIN 100 %
OINTMENT (GRAM) TOPICAL
Status: DISCONTINUED | OUTPATIENT
Start: 2018-06-08 | End: 2018-06-10 | Stop reason: HOSPADM

## 2018-06-08 RX ORDER — CALCIUM GLUCONATE 94 MG/ML
1 INJECTION, SOLUTION INTRAVENOUS
Status: DISCONTINUED | OUTPATIENT
Start: 2018-06-08 | End: 2018-06-10 | Stop reason: HOSPADM

## 2018-06-08 RX ORDER — EPHEDRINE SULFATE 50 MG/ML
INJECTION, SOLUTION INTRAMUSCULAR; INTRAVENOUS; SUBCUTANEOUS
Status: DISCONTINUED
Start: 2018-06-08 | End: 2018-06-08 | Stop reason: HOSPADM

## 2018-06-08 RX ORDER — LIDOCAINE 40 MG/G
CREAM TOPICAL
Status: DISCONTINUED | OUTPATIENT
Start: 2018-06-08 | End: 2018-06-08

## 2018-06-08 RX ORDER — SODIUM CHLORIDE, SODIUM LACTATE, POTASSIUM CHLORIDE, CALCIUM CHLORIDE 600; 310; 30; 20 MG/100ML; MG/100ML; MG/100ML; MG/100ML
10-125 INJECTION, SOLUTION INTRAVENOUS CONTINUOUS
Status: DISPENSED | OUTPATIENT
Start: 2018-06-08 | End: 2018-06-09

## 2018-06-08 RX ORDER — MAGNESIUM SULFATE HEPTAHYDRATE 500 MG/ML
4 INJECTION, SOLUTION INTRAMUSCULAR; INTRAVENOUS
Status: DISCONTINUED | OUTPATIENT
Start: 2018-06-08 | End: 2018-06-10 | Stop reason: HOSPADM

## 2018-06-08 RX ORDER — OXYTOCIN 10 [USP'U]/ML
10 INJECTION, SOLUTION INTRAMUSCULAR; INTRAVENOUS
Status: DISCONTINUED | OUTPATIENT
Start: 2018-06-08 | End: 2018-06-10 | Stop reason: HOSPADM

## 2018-06-08 RX ORDER — OXYTOCIN/0.9 % SODIUM CHLORIDE 30/500 ML
PLASTIC BAG, INJECTION (ML) INTRAVENOUS
Status: COMPLETED
Start: 2018-06-08 | End: 2018-06-08

## 2018-06-08 RX ORDER — ACETAMINOPHEN 325 MG/1
650 TABLET ORAL EVERY 4 HOURS PRN
Status: DISCONTINUED | OUTPATIENT
Start: 2018-06-08 | End: 2018-06-10 | Stop reason: HOSPADM

## 2018-06-08 RX ORDER — HYDROCORTISONE 2.5 %
CREAM (GRAM) TOPICAL 3 TIMES DAILY PRN
Status: DISCONTINUED | OUTPATIENT
Start: 2018-06-08 | End: 2018-06-10 | Stop reason: HOSPADM

## 2018-06-08 RX ORDER — AMOXICILLIN 250 MG
2 CAPSULE ORAL 2 TIMES DAILY
Status: DISCONTINUED | OUTPATIENT
Start: 2018-06-08 | End: 2018-06-10 | Stop reason: HOSPADM

## 2018-06-08 RX ORDER — MISOPROSTOL 200 UG/1
TABLET ORAL
Status: DISCONTINUED
Start: 2018-06-08 | End: 2018-06-08 | Stop reason: HOSPADM

## 2018-06-08 RX ORDER — FENTANYL/BUPIVACAINE/NS/PF 2-1250MCG
10 PLASTIC BAG, INJECTION (ML) INJECTION CONTINUOUS
Status: DISCONTINUED | OUTPATIENT
Start: 2018-06-08 | End: 2018-06-08

## 2018-06-08 RX ORDER — IBUPROFEN 800 MG/1
800 TABLET, FILM COATED ORAL EVERY 6 HOURS PRN
Status: DISCONTINUED | OUTPATIENT
Start: 2018-06-08 | End: 2018-06-10 | Stop reason: HOSPADM

## 2018-06-08 RX ORDER — MAGNESIUM SULFATE HEPTAHYDRATE 40 MG/ML
4 INJECTION, SOLUTION INTRAVENOUS
Status: DISCONTINUED | OUTPATIENT
Start: 2018-06-08 | End: 2018-06-10 | Stop reason: HOSPADM

## 2018-06-08 RX ORDER — NALOXONE HYDROCHLORIDE 0.4 MG/ML
.1-.4 INJECTION, SOLUTION INTRAMUSCULAR; INTRAVENOUS; SUBCUTANEOUS
Status: DISCONTINUED | OUTPATIENT
Start: 2018-06-08 | End: 2018-06-08

## 2018-06-08 RX ORDER — NALBUPHINE HYDROCHLORIDE 10 MG/ML
2.5-5 INJECTION, SOLUTION INTRAMUSCULAR; INTRAVENOUS; SUBCUTANEOUS EVERY 6 HOURS PRN
Status: DISCONTINUED | OUTPATIENT
Start: 2018-06-08 | End: 2018-06-08

## 2018-06-08 RX ORDER — EPHEDRINE SULFATE 50 MG/ML
5 INJECTION, SOLUTION INTRAMUSCULAR; INTRAVENOUS; SUBCUTANEOUS
Status: DISCONTINUED | OUTPATIENT
Start: 2018-06-08 | End: 2018-06-08

## 2018-06-08 RX ORDER — AMOXICILLIN 250 MG
1 CAPSULE ORAL 2 TIMES DAILY
Status: DISCONTINUED | OUTPATIENT
Start: 2018-06-08 | End: 2018-06-10 | Stop reason: HOSPADM

## 2018-06-08 RX ORDER — MISOPROSTOL 200 UG/1
400 TABLET ORAL
Status: DISCONTINUED | OUTPATIENT
Start: 2018-06-08 | End: 2018-06-10 | Stop reason: HOSPADM

## 2018-06-08 RX ORDER — MAGNESIUM SULFATE IN WATER 40 MG/ML
2 INJECTION, SOLUTION INTRAVENOUS CONTINUOUS
Status: DISPENSED | OUTPATIENT
Start: 2018-06-08 | End: 2018-06-09

## 2018-06-08 RX ORDER — NIFEDIPINE 10 MG/1
10 CAPSULE ORAL
Status: DISCONTINUED | OUTPATIENT
Start: 2018-06-08 | End: 2018-06-10 | Stop reason: HOSPADM

## 2018-06-08 RX ORDER — OXYTOCIN/0.9 % SODIUM CHLORIDE 30/500 ML
1-24 PLASTIC BAG, INJECTION (ML) INTRAVENOUS CONTINUOUS
Status: DISCONTINUED | OUTPATIENT
Start: 2018-06-08 | End: 2018-06-08

## 2018-06-08 RX ORDER — LABETALOL HYDROCHLORIDE 5 MG/ML
40 INJECTION, SOLUTION INTRAVENOUS EVERY 10 MIN PRN
Status: DISCONTINUED | OUTPATIENT
Start: 2018-06-08 | End: 2018-06-10 | Stop reason: HOSPADM

## 2018-06-08 RX ORDER — LABETALOL HYDROCHLORIDE 5 MG/ML
80 INJECTION, SOLUTION INTRAVENOUS EVERY 10 MIN PRN
Status: DISCONTINUED | OUTPATIENT
Start: 2018-06-08 | End: 2018-06-10 | Stop reason: HOSPADM

## 2018-06-08 RX ORDER — BISACODYL 10 MG
10 SUPPOSITORY, RECTAL RECTAL DAILY PRN
Status: DISCONTINUED | OUTPATIENT
Start: 2018-06-10 | End: 2018-06-10 | Stop reason: HOSPADM

## 2018-06-08 RX ADMIN — SODIUM CHLORIDE, POTASSIUM CHLORIDE, SODIUM LACTATE AND CALCIUM CHLORIDE 75 ML/HR: 600; 310; 30; 20 INJECTION, SOLUTION INTRAVENOUS at 06:56

## 2018-06-08 RX ADMIN — MAGNESIUM SULFATE IN WATER 2 G/HR: 40 INJECTION, SOLUTION INTRAVENOUS at 14:15

## 2018-06-08 RX ADMIN — Medication 10 ML/HR: at 19:32

## 2018-06-08 RX ADMIN — SODIUM CHLORIDE, POTASSIUM CHLORIDE, SODIUM LACTATE AND CALCIUM CHLORIDE 500 ML: 600; 310; 30; 20 INJECTION, SOLUTION INTRAVENOUS at 18:40

## 2018-06-08 RX ADMIN — SODIUM CHLORIDE, POTASSIUM CHLORIDE, SODIUM LACTATE AND CALCIUM CHLORIDE 500 ML: 600; 310; 30; 20 INJECTION, SOLUTION INTRAVENOUS at 20:00

## 2018-06-08 RX ADMIN — LIDOCAINE HYDROCHLORIDE,EPINEPHRINE BITARTRATE 3 ML: 15; .005 INJECTION, SOLUTION EPIDURAL; INFILTRATION; INTRACAUDAL; PERINEURAL at 19:25

## 2018-06-08 RX ADMIN — OXYTOCIN-SODIUM CHLORIDE 0.9% IV SOLN 30 UNIT/500ML 2 MILLI-UNITS/MIN: 30-0.9/5 SOLUTION at 14:15

## 2018-06-08 RX ADMIN — SODIUM CHLORIDE 5 MILLION UNITS: 9 INJECTION, SOLUTION INTRAVENOUS at 08:36

## 2018-06-08 RX ADMIN — Medication 2 MILLI-UNITS/MIN: at 14:15

## 2018-06-08 RX ADMIN — SODIUM CHLORIDE, POTASSIUM CHLORIDE, SODIUM LACTATE AND CALCIUM CHLORIDE 65 ML/HR: 600; 310; 30; 20 INJECTION, SOLUTION INTRAVENOUS at 20:29

## 2018-06-08 RX ADMIN — ACETAMINOPHEN 650 MG: 325 TABLET, FILM COATED ORAL at 09:57

## 2018-06-08 RX ADMIN — OXYTOCIN-SODIUM CHLORIDE 0.9% IV SOLN 30 UNIT/500ML 340 ML/HR: 30-0.9/5 SOLUTION at 20:27

## 2018-06-08 RX ADMIN — SODIUM CHLORIDE, POTASSIUM CHLORIDE, SODIUM LACTATE AND CALCIUM CHLORIDE: 600; 310; 30; 20 INJECTION, SOLUTION INTRAVENOUS at 19:05

## 2018-06-08 RX ADMIN — MAGNESIUM SULFATE IN WATER 2 G/HR: 40 INJECTION, SOLUTION INTRAVENOUS at 04:35

## 2018-06-08 RX ADMIN — Medication 2.5 MILLION UNITS: at 16:44

## 2018-06-08 RX ADMIN — Medication 10 ML/HR: at 19:34

## 2018-06-08 RX ADMIN — Medication 2.5 MILLION UNITS: at 12:32

## 2018-06-08 RX ADMIN — Medication 8 ML: at 19:31

## 2018-06-08 NOTE — PROVIDER NOTIFICATION
06/08/18 0825   Provider Notification   Provider Name/Title Dr Wilkerson   Method of Notification At Bedside   Notification Reason Status Update  (AM ROUNDING)   Awaiting transfer to L&D for IOL. Per Dr Wilkerson start first dose of PCN now.

## 2018-06-08 NOTE — PROGRESS NOTES
AROM performed, clear fluid. Cervical exam 3/70/-1. Will start pitocin augmentation if she does not spontaneously labor.       Mara Warren MD   Resident Physician, PGY2  Obstetrics, Gynecology, and Women's Health

## 2018-06-08 NOTE — PLAN OF CARE
Problem: Patient Care Overview  Goal: Plan of Care/Patient Progress Review  Outcome: No Change  [facility]   Reason for maternal/fetal assessment per patient is Hypertension  .  Patient is a . Prenatal record reviewed.      Obstetric History       T3      L3     SAB0   TAB0   Ectopic0   Multiple0   Live Births3       # Outcome Date GA Lbr Mayank/2nd Weight Sex Delivery Anes PTL Lv   4 Current            3 Term 09/12/10 39w0d 06:00 3.799 kg (8 lb 6 oz) M    MICHELINE      Name: radha   2 Term 08 40w2d 03:00 3.175 kg (7 lb) F    MICHELINE      Name: Marcella   1 Term 06 40w0d 05:00 2.722 kg (6 lb) F    MICHELINE      Name: Estephanie      . Medical history:   Past Medical History:   Diagnosis Date     Cardiac abnormality     migraine     Hypertension     only during pregnancy     NO ACTIVE PROBLEMS      Postpartum depression    . Gestational Age 39w1d. VSS. Fetal movement present. Patient denies cramping, backache, vaginal discharge, pelvic pressure, UTI symptoms, GI problems, bloody show, vaginal bleeding, edema, visual disturbances, epigastric or URQ pain, abdominal pain, rupture of membranes. Support persons Raheel () and Mannie and Brigitte (bioparents) present.  Action: Verbal consent for EFM. Triage assessment completed. EFM and TOCO applied. Fetal assessment: Presumed adequate fetal oxygenation documented (see flow record).   Response: Dr. Warren informed of patient arrival. Plan per provider is for BP to be monitored closely, IV magnesium to be started and to monitor for sx of Pre-E. Patient verbalized agreement with plan. Patient transferred to room 422 via , oriented to room and call light.

## 2018-06-08 NOTE — PROVIDER NOTIFICATION
06/08/18 1144   Provider Notification   Provider Name/Title Dr. Wilkerson   Method of Notification At Bedside   Request Evaluate in Person   Notification Reason Membrane Status;SVE;Status Update   Provider present to perform AROM, clear fluid. Will proceed with ongoing assessment.

## 2018-06-08 NOTE — PLAN OF CARE
Problem: Patient Care Overview  Goal: Plan of Care/Patient Progress Review  Outcome: Improving  Pt VSS and afebrile, FHR cat 1, occasional ctx noted on toco, pt comfortable see flowsheet for more detail. Pt. Denies any s/s of worsening pre-eclampsia or magnesium toxicity. Pitocin initiated per MD. Pt. Walking and resting intermittently. Pt. Notified when to call out to RN, verbalized understanding and agreed to plan.

## 2018-06-08 NOTE — ANESTHESIA PREPROCEDURE EVALUATION
Anesthesia Evaluation     .             ROS/MED HX    ENT/Pulmonary:  - neg pulmonary ROS     Neurologic:  - neg neurologic ROS     Cardiovascular:         METS/Exercise Tolerance:     Hematologic:         Musculoskeletal:         GI/Hepatic:  - neg GI/hepatic ROS       Renal/Genitourinary:         Endo:         Psychiatric:         Infectious Disease:         Malignancy:         Other:                     Physical Exam  Normal systems: cardiovascular, pulmonary and dental    Airway     Dental     Cardiovascular       Pulmonary     Other findings: 32 yo F  at 39.2 wks admitted for IOL 2/2 preeclampsia with severe features         (+) pre-eclampsia               Anesthesia Plan      History & Physical Review      ASA Status:  .  OB Epidural Asa: 2            Postoperative Care      Consents  Anesthetic plan, risks, benefits and alternatives discussed with:  Patient..

## 2018-06-08 NOTE — PROGRESS NOTES
Magnesium Check    Subjective: Patient feeling well, headache resolved. No vision changes, upper abdominal pain, chest pain or SOB. Pitocin was started, she is starting to feel some contractions, still mild    Objective:   Vitals:    18 1015 18 1238 18 1415 18 1458   BP: 124/63 144/78 143/82 142/70   Pulse:       Resp:     Temp: 98.3  F (36.8  C) 98.4  F (36.9  C) 98.5  F (36.9  C)    TempSrc: Oral Oral Oral    SpO2:       Weight:       Height:         Gen: NAD  Resp: CTAB  CV: RRR with no murmurs  Abdomen: soft, nontender  Ext: warm, well perfused, 1+ patellar and biceps reflexes.    FHT:  Baseline Rate: 120; moderate variability; Decelerations: absent; Accelerations: present;            Category: 1  [place]: ctx 3 in 10 mins    I/O last 3 completed shifts:  In: 2763.75 [P.O.:1080; I.V.:1683.75]  Out: 4000 [Urine:4000]  UOP: 400mL/hr    Assessment/Plan: Ana Lilia Arrington is a   at 39w2d admitted for IOL secondary to preeclampsia with severe features.  No s/s of worsening preeclampsia or magnesium toxicity. Pitocin started for augmentation of labor     Preeclampsia with Severe features (by BPs, UPC 1.74)  - HELLP labs wnl. Repeat HELLP labs as clinically indicated.   - UOP adequate. BPs mild range.  - S/p Mg 4g load. Continue IV magnesium sulfate 2g/h. Obtain Magnesium level as clinically indicated   - Continue q4h Mag checks; next at 1900  - Sustained severe range BP on admission, resolved w/out IV antihypertensives. Continue to monitor      Fetal well being  - Category 1  - Continuous fetal monitoring      Labor progress:  -IOL: s/p AROM. Continue pitocin augmentation  -Pain: Desires nothing   -GBS positive, PCN started at 0830  -Cervical exam PRN     Mara Warren MD   Resident Physician, PGY2  Obstetrics, Gynecology, and Women's Health

## 2018-06-08 NOTE — PROGRESS NOTES
Magnesium Check    Subjective: Patient feeling well, ready to be induced. Reports mild frontal headache this morning, feels like a normal headache. Took tylenol, will let us know if it is not improved. No vision changes, upper abdominal pain, chest pain or SOB.     Objective:   Vitals:    18 2311 18 2335 18 0330 18 0727   BP: 139/67  118/72 143/83   Pulse:    55   Resp:    Temp:  98.1  F (36.7  C)  98  F (36.7  C)   TempSrc:  Oral  Oral   SpO2: 99%      Weight:       Height:         Gen: NAD  Resp: CTAB  CV: RRR with no murmurs  Abdomen: soft, nontender  Ext: warm, well perfused, 1+ patellar and biceps reflexes.     FHT:  Baseline Rate: 140; moderate variability; Decelerations: absent; Accelerations: present;            Category: 1  [place]: ctx 0-1 in 10 mins  SVE: 3/50/-1, mid, soft per Dr. Wilkerson    BSUS: Cephalic presentation     I/O last 3 completed shifts:  In: 2075.83 [P.O.:600; I.V.:1475.83]  Out: 2200 [Urine:2200]  UOP: 200mL/hr    Assessment/Plan: Ana Lilia Arrington is a  at 39w2d admitted for IOL secondary to preeclampsia with severe features.  No s/s of worsening preeclampsia or magnesium toxicity. Plan for IOL with AROM when PCN is adequate.     Preeclampsia with Severe features (by BPs, UPC 1.74)  - HELLP labs wnl. Repeat HELLP labs as clinically indicated.   - UOP adequate. BPs normotensive.  - S/p Mg 4g load. Continue IV magnesium sulfate 2g/h. Obtain Magnesium level as clinically indicated   - Continue q4h Mag checks; next at 1400  - Sustained severe range BP on admission, resolved w/out IV antihypertensives. Continue to monitor     Fetal well being  - Category 1  - Continuous fetal monitoring     Labor progress:  -IOL: Plan for AROM after adequate PCN  -Pain: Desires nothing   -GBS positive, PCN started at 0830    Mara Warren MD   Resident Physician, PGY2  Obstetrics, Gynecology, and Women's Health    Physician Attestation   ITerrie,  personally examined and evaluated this patient.  I discussed the patient with the medical student and/or resident and care team, and agree with the assessment and plan of care as documented in the note of 06/08/18  [date].      I personally reviewed vital signs, medications, labs and exam, fetal heart rate tracing..    Key findings: Category 1 tracing, BPs well controlled. Start IOL with AROM at next check, when PCN closer to adequate.  Terrie Wilkerson MD  Date of Service (when I saw the patient): 06/08/18

## 2018-06-08 NOTE — PLAN OF CARE
Problem: Patient Care Overview  Goal: Plan of Care/Patient Progress Review  Outcome: Improving  VSS. Patient denies vision changes, epigastric pain and edema. Stated she did have a mild headache over her left eye which resolved with PO Tylenol. No sx of mag toxicity. UOP adequate. Not currently partha except for one mild contraction that patient noted. No vaginal bleeding. No LOF. FHT baseline 125-130 with moderate variability, accels and no decels. Plan is for patient to rest overnight and hopefully start induction tomorrow. Continue to monitor closely.

## 2018-06-08 NOTE — PROVIDER NOTIFICATION
06/08/18 0950   Provider Notification   Provider Name/Title Dr. Wilkerson   Method of Notification At Bedside   Notification Reason Status Update;SVE   Dr. Wilkerson at bedside performing SVE, plan per MD to re-evaluate 4 hours after PCN started.

## 2018-06-08 NOTE — PLAN OF CARE
Problem: Patient Care Overview  Goal: Plan of Care/Patient Progress Review  Outcome: Improving  Patient awake much of the night, but denies pain, contractions, bleeding or loss of fluid. BPs all within normal range. Afebrile. EFM as charted. Plan to transfer to labor for induction when bed and staff available.

## 2018-06-08 NOTE — PROVIDER NOTIFICATION
06/08/18 1526   Provider Notification   Provider Name/Title Kelvin   Method of Notification In Department   Request Evaluate - Remote   Notification Reason Maternal Vital Sign Change   Provider notified of pt elevated BP, no other worsening s/s of pre-eclampsia. Will proceed with ongoing assessment.

## 2018-06-09 LAB
ALBUMIN SERPL-MCNC: 2.2 G/DL (ref 3.4–5)
ALP SERPL-CCNC: 114 U/L (ref 40–150)
ALT SERPL W P-5'-P-CCNC: 14 U/L (ref 0–50)
ANION GAP SERPL CALCULATED.3IONS-SCNC: 6 MMOL/L (ref 3–14)
AST SERPL W P-5'-P-CCNC: 19 U/L (ref 0–45)
BILIRUB SERPL-MCNC: 0.3 MG/DL (ref 0.2–1.3)
BUN SERPL-MCNC: 6 MG/DL (ref 7–30)
CALCIUM SERPL-MCNC: 7.2 MG/DL (ref 8.5–10.1)
CHLORIDE SERPL-SCNC: 105 MMOL/L (ref 94–109)
CO2 SERPL-SCNC: 29 MMOL/L (ref 20–32)
CREAT SERPL-MCNC: 0.66 MG/DL (ref 0.52–1.04)
ERYTHROCYTE [DISTWIDTH] IN BLOOD BY AUTOMATED COUNT: 15.6 % (ref 10–15)
GFR SERPL CREATININE-BSD FRML MDRD: >90 ML/MIN/1.7M2
GLUCOSE SERPL-MCNC: 83 MG/DL (ref 70–99)
HCT VFR BLD AUTO: 35.5 % (ref 35–47)
HGB BLD-MCNC: 11.4 G/DL (ref 11.7–15.7)
MAGNESIUM SERPL-MCNC: 6 MG/DL (ref 1.6–2.3)
MCH RBC QN AUTO: 25.9 PG (ref 26.5–33)
MCHC RBC AUTO-ENTMCNC: 32.1 G/DL (ref 31.5–36.5)
MCV RBC AUTO: 81 FL (ref 78–100)
PLATELET # BLD AUTO: 228 10E9/L (ref 150–450)
POTASSIUM SERPL-SCNC: 4.2 MMOL/L (ref 3.4–5.3)
PROT SERPL-MCNC: 6.2 G/DL (ref 6.8–8.8)
RBC # BLD AUTO: 4.4 10E12/L (ref 3.8–5.2)
SODIUM SERPL-SCNC: 140 MMOL/L (ref 133–144)
WBC # BLD AUTO: 11.6 10E9/L (ref 4–11)

## 2018-06-09 PROCEDURE — 80053 COMPREHEN METABOLIC PANEL: CPT | Performed by: OBSTETRICS & GYNECOLOGY

## 2018-06-09 PROCEDURE — 36415 COLL VENOUS BLD VENIPUNCTURE: CPT | Performed by: OBSTETRICS & GYNECOLOGY

## 2018-06-09 PROCEDURE — 25000128 H RX IP 250 OP 636: Performed by: OBSTETRICS & GYNECOLOGY

## 2018-06-09 PROCEDURE — 25000132 ZZH RX MED GY IP 250 OP 250 PS 637: Performed by: OBSTETRICS & GYNECOLOGY

## 2018-06-09 PROCEDURE — 83735 ASSAY OF MAGNESIUM: CPT | Performed by: OBSTETRICS & GYNECOLOGY

## 2018-06-09 PROCEDURE — 12000030 ZZH R&B OB INTERMEDIATE UMMC

## 2018-06-09 PROCEDURE — 85027 COMPLETE CBC AUTOMATED: CPT | Performed by: OBSTETRICS & GYNECOLOGY

## 2018-06-09 RX ORDER — ACETAMINOPHEN 325 MG/1
650 TABLET ORAL EVERY 4 HOURS PRN
Qty: 40 TABLET | Refills: 0 | Status: SHIPPED | OUTPATIENT
Start: 2018-06-09 | End: 2018-06-10

## 2018-06-09 RX ORDER — IBUPROFEN 600 MG/1
600 TABLET, FILM COATED ORAL EVERY 6 HOURS PRN
Qty: 30 TABLET | Refills: 1 | Status: SHIPPED | OUTPATIENT
Start: 2018-06-09 | End: 2018-06-09

## 2018-06-09 RX ORDER — IBUPROFEN 600 MG/1
600 TABLET, FILM COATED ORAL EVERY 6 HOURS PRN
Qty: 30 TABLET | Refills: 1 | Status: SHIPPED | OUTPATIENT
Start: 2018-06-09 | End: 2018-06-10

## 2018-06-09 RX ADMIN — MAGNESIUM SULFATE IN WATER 2 G/HR: 40 INJECTION, SOLUTION INTRAVENOUS at 00:13

## 2018-06-09 RX ADMIN — SODIUM CHLORIDE, POTASSIUM CHLORIDE, SODIUM LACTATE AND CALCIUM CHLORIDE 75 ML/HR: 600; 310; 30; 20 INJECTION, SOLUTION INTRAVENOUS at 03:10

## 2018-06-09 RX ADMIN — SENNOSIDES AND DOCUSATE SODIUM 1 TABLET: 8.6; 5 TABLET ORAL at 20:14

## 2018-06-09 RX ADMIN — MAGNESIUM SULFATE IN WATER 2 G/HR: 40 INJECTION, SOLUTION INTRAVENOUS at 10:01

## 2018-06-09 RX ADMIN — SENNOSIDES AND DOCUSATE SODIUM 2 TABLET: 8.6; 5 TABLET ORAL at 10:01

## 2018-06-09 NOTE — PROGRESS NOTES
[facility]   Postpartum Note    Name:  Ana Lilia Arrington  MRN: [id number]    S: Patient is doing well.  Denies pain.  Tolerating regular diet without nausea or vomiting.  Ambulating without dizziness.  Lochia is normal, similar to menses.  Voiding spontaneously.  Denies headache, vision changes, RUQ pain, chest pain, SOB, increasing edema.    O:   Patient Vitals for the past 24 hrs:   BP Temp Temp src Pulse Heart Rate Resp SpO2   06/09/18 0302 (!) 144/94 98.6  F (37  C) Oral 60 - 16 99 %   06/08/18 2300 130/77 - - - 63 16 99 %   06/08/18 2232 149/76 - - - - 18 97 %   06/08/18 2221 133/74 - - - - 18 98 %   06/08/18 2202 154/78 - - - - 18 98 %   06/08/18 2146 133/77 - - - - 18 98 %   06/08/18 2132 128/70 - - - - 18 98 %   06/08/18 2127 141/75 - - - - 18 98 %   06/08/18 2101 123/69 - - - - 18 98 %   06/08/18 2047 121/67 - - - - 18 99 %   06/08/18 2031 113/59 - - - - 18 99 %   06/08/18 2016 126/60 - - - - - -   06/08/18 2014 110/65 - - - - - -   06/08/18 2010 105/56 - - - - - -   06/08/18 2009 109/67 - - - - - -   06/08/18 2007 104/59 - - - - - -   06/08/18 2005 106/60 - - - - - -   06/08/18 2003 102/57 - - - - 18 97 %   06/08/18 2000 94/51 - - - - 18 98 %   06/08/18 1955 95/53 - - - - 18 97 %   06/08/18 1950 97/54 - - - - 18 98 %   06/08/18 1945 99/57 - - - - 18 98 %   06/08/18 1940 102/59 - - - - 18 100 %   06/08/18 1938 94/54 - - - - 18 100 %   06/08/18 1936 101/56 - - - - 18 100 %   06/08/18 1934 111/59 - - - - 18 98 %   06/08/18 1932 106/56 - - - - 18 98 %   06/08/18 1930 108/59 - - - - 18 100 %   06/08/18 1925 107/55 - - - - 18 99 %   06/08/18 1921 99/55 - - - - 18 99 %   06/08/18 1859 141/90 - - - - 18 -   06/08/18 1803 133/79 97.8  F (36.6  C) Oral - - 18 -   06/08/18 1730 137/87 - - - - 18 -   06/08/18 1710 132/84 - - - - 18 -   06/08/18 1613 137/85 - - - - 18 -   06/08/18 1557 (!) 159/93 - - - - 18 -   06/08/18 1556 163/90 98.2  F (36.8  C) Oral - - 18 -   06/08/18 1458 142/70 - -  - - - -   18 1415 143/82 98.5  F (36.9  C) Oral - -  -   18 1238 144/78 98.4  F (36.9  C) Oral - -  -   18 1015 124/63 98.3  F (36.8  C) Oral - -  -   18 0727 143/83 98  F (36.7  C) Oral 55 - 16 -     Gen:  Resting comfortably, NAD  CV:  RRR, no murmur  Pulm:  CTAB, no wheezes  Abd:  Soft, appropriately ttp, non-distended. Fundus below umbilicus, firm and non-tender.  Ext:  non-tender, 1+ LE edema b/l, 1+ patellar and brachioradialis reflexes, no clonus b/l    I/O last 3 completed shifts:  In: 5465.35 [P.O.:1080; I.V.:4385.35]  Out: 5418 [Urine:5150; Blood:268]    Hgb:   Hemoglobin   Date Value Ref Range Status   2018 11.8 11.7 - 15.7 g/dL Final       Assessment/Plan:  Ana Lilia Arrington is a 33 year old  on PPD#1 s/p , doing well in the postpartum period.    # Routine postpartum management  Pain: Well-controlled with ibuprofen and tylenol  Hgb: 11.8> mL> AM pending, asymptomatic.   GI:  PRN bowel regimen.   :  Voiding spontaneously, no issues.   Rh: Positive, Rhogam not indicated.   Rubella: Immune, MMR not indicated.   Feed: N/A, gestational carrier.    BC: Not discussed this morning.     # Pre-eclampsia with severe features:  - BPs normotensive to mild range.  Continue to monitor and start medications PRN.   - HELLP labs wnl, AM labs pending.   - Symptoms: Denies  - Strict Is/Os, daily weights. >5 L UOP/24 hours. Weight down 7 lb.   - IV magnesium for 24 hours postpartum, no s/s of IV magnesium toxicity.   - Next clinical magnesium check at 1000.     # Pain Assessment:  Current Pain Score 2018   Patient currently in pain? denies   Pain location -   Pain descriptors -   - Ana Lilia is experiencing pain due to vaginal delivery. Pain management was discussed and the plan was created in a collaborative fashion.  Iris's response to the current recommendations: engaged  - Please see the plan for pain management as documented above    Dispo: Anticipate dc home PPD#2  pending BP control and meeting postpartum goals     Roxanne Davenport MD  Ob/Gyn, PGY-3      I, Heather Bridges MD, personally saw and evaluated this patient.  I discussed the patient with the resident and care team, and agree with the assessment and plan of care as documented in the resident's note of 06/09/18.  I personally reviewed vital signs, medications, lab, and exam.     Key Findings:  BP stable. Asymptomatic. Labs this morning reassuring.     PLAN:  Continue MgSO4 until 1800 as planned, continue to monitor blood pressure.  If condition satisfactory, anticipate discharge home tomorrow.      Heather Bridges MD   Date of Service (when I saw the patient) 06/09/18

## 2018-06-09 NOTE — PROVIDER NOTIFICATION
06/08/18 2132   Comments   Comments epidural placed   Epidural placed at 2130 without complications. Patient had good relief and was able to sleep for a little while after her epidural was placed.

## 2018-06-09 NOTE — PROGRESS NOTES
[facility] PROGRESS NOTE:   2018          SUBJECTIVE:   Patient c/o more pain with contractions and pressure.    Contractions:  q 3-4 minutes  Leakage of fluid:  Yes: clear  Vaginal bleeding:  No  Pain controlled:  No, ready for epidural           OBJECTIVE:     Vitals:    18 1710 18 1730 18 1803 18 1859   BP: 132/84 137/87 133/79 141/90   Pulse:       Resp:    Temp:   97.8  F (36.6  C)    TempSrc:   Oral    SpO2:       Weight:       Height:             NST:  Fetal Heart Rate Tracing:   Baseline: 120  Variability: Moderate  Accels, no decels    Tocometer: q 3-4 minutes, pitocin 10mu/min    Abdomen:  Gravid, NT  Cervix:   Dilation: 4   Effacement: 100%   Station:-1   Consistency: soft   Position: Mid           LABS:     Recent Results (from the past 12 hour(s))   AST    Collection Time: 18  5:58 PM   Result Value Ref Range    AST 20 0 - 45 U/L   ALT    Collection Time: 18  5:58 PM   Result Value Ref Range    ALT 14 0 - 50 U/L   Creatinine    Collection Time: 18  5:58 PM   Result Value Ref Range    Creatinine 0.64 0.52 - 1.04 mg/dL    GFR Estimate >90 >60 mL/min/1.7m2    GFR Estimate If Black >90 >60 mL/min/1.7m2   CBC with platelets    Collection Time: 18  5:58 PM   Result Value Ref Range    WBC 8.8 4.0 - 11.0 10e9/L    RBC Count 4.51 3.8 - 5.2 10e12/L    Hemoglobin 11.8 11.7 - 15.7 g/dL    Hematocrit 36.6 35.0 - 47.0 %    MCV 81 78 - 100 fl    MCH 26.2 (L) 26.5 - 33.0 pg    MCHC 32.2 31.5 - 36.5 g/dL    RDW 15.6 (H) 10.0 - 15.0 %    Platelet Count 244 150 - 450 10e9/L              ASSESSMENT:   33 year old  at 39w2d   induction of labor, indication pre-eclampsia with severe features            PLAN:   On magnesiu. Labs normal at 1800  AROM previously performed  Continue pitocin  Anticipate     Terrie Wilkerson MD

## 2018-06-09 NOTE — L&D DELIVERY NOTE
Delivery Summary    Stage 1:  33 year old  gestational carrier presented at 39 weeks 1 days with severe range blood pressures and proteinuria. Magnesium prophylaxis was started for pre-eclampsia with severe features. Labs were normal. Induction was held overnight due to staffing issues and patient stability. Blood pressures were normal from then on. GBS positive. Penicillin was given and was adequate. AROM was performed. Contractions did not shortly follow and pitocin was started. She requested and received epidural. There was one two-minute deceleration of the fetal heart rate tracing and patient stated she felt more pressure. She was complete and +3.     Stage 2:  Patient did not labor down. She pushed effectively. She delivered a viable female infant with Apgars 8/9 over a first degree laceration. Weight is pending.   Delayed cord clamping was performed. Cord was clamped and cut. Infant was handed to the mother, who was present for delivery, for skin-to-skin.    Stage 3:  Placenta delivered rapidly with active management, intact, three vessel cord.   first degree laceration was repaired with 3-0 vicryl in one box stitch. Epidural was sufficient analgesia for repair.     Sponge and needle counts correct.    Magnesium was continued postpartum.    Expected date of discharge: 6/10/18.     Terrie Wilkerson MD    Ana Lilia Arrington MRN# [id number]   Age: 33 year old YOB: 1985     Labor Event Times:    Labor Onset Date       Labor Onset Time    Dilation Complete Date    Dilation Complete Time       Start Pushing Date        Start Pushing Time            Labor Length:    1st Stage (hrs/min)     2nd Stage (hrs/min)     3rd Stage (hrs/min)         Labor Events:     Labor    Rupture Date     Rupture Time     Rupture Type Artificial Rupture of Membranes   Fluid Color     Labor Type     Induction    Induction Indication         Augmentation    Labor Complications     Additional Complications      Management of Labor        Antibiotics     IV Antibiotic Given     Additional Management     Fetal Status Prior to  Delivery     Fetal Status Comments         Cervical Ripening:    Date     Time     Type         Delivery:    Episiotomy None   Local Anesthetic        Lacerations 1st   Sponge Count Correct       Needle Count Correct     Final Count by:    Sutures     Blood loss (ml)    Packing Intentionally Left In     Number     Comments           Information for the patient's :  Pili Courtney [[id number]]       Delivery  2018 8:23 PM by     Sex:  female Gestational Age: 39w2d  Delivery Clinician:     Living?:            APGARS  One minute Five minutes Ten minutes   Skin color:            Heart rate:            Grimace:            Muscle tone:            Breathing:            Totals:              Presentation/position:           Resuscitation and Interventions: Method:     Oxygen Type:     Intubation Time:   # of Attempts:     ETT Size:        Tracheal Suction:     Tracheal returns:       Care at Delivery:           Cord information:     Disposition of cord blood:      Blood gases sent?    Complications:     Placenta: Delivered:           appearance.  Comments:  .  Disposition: Hospital disposal  [place] Measurements:  Weight:    Height:    Head circumference:    Chest circumference:     Temperature:     Other providers:       Additional  information:  Forceps:    Verbal Informed Consent Obtained:       Alternative Labor Strategies Discussed:     Emergency Resources Available:       Type:       Accrued Pulling Time:       # of Pulls:      Position:     Fetal Station:       Indications:      Other Indications:     Operative Vaginal Delivery Brief Note Forceps:        Vacuum:    Verbal Informed Consent Obtained:     Alternative Labor Strategies Discussed:     Emergency Resources Available:     Type:      Accrued Pulling Time:       # of Pop-Offs:       # of Pulls:       Position:     Fetal  Station:      Indications for Vacuum:       Other Indications:    Operative Vaginal Delivery Brief Note Vacuum:        Shoulder Dystocia Shoulder Dystocia    Fetal Tracing Prior to Delivery:  Category 2   Fetal Tracing Comments:  one 2 minute deceleration   Shoulder dystocia present?:  No                                            Breech:       : Type:     Indications for Primary:     Indications for Secondary:     Other Indications:        Observed anomalies     Output in Delivery Room:

## 2018-06-09 NOTE — PLAN OF CARE
Problem: Patient Care Overview  Goal: Plan of Care/Patient Progress Review  Outcome: Improving  Vaginal Delivery Note   of viable Female with Dr. JEREMIAS Wilkerson in attendance.  NICU/Nursery RN Jennifer present.  Infant with spontaneous cry, to mother's arms who was present for birth, dried and stimulated.  APGAR at 1 minute:  8 and APGAR at 5 minutes:  9.  Placenta delivered with out complication, IV pitocin started after delivery of baby, first laceration, with suture repair, elina cares provided.  Mother and baby in stable condition.

## 2018-06-09 NOTE — PROGRESS NOTES
[facility]  Magnesium Check Note    S:   Patient is feeling well; denies concerns.  Denies headache, vision changes/spots in vision, chest pain, shortness of breath, RUQ or epigastric pain.    O:  Patient Vitals for the past 4 hrs:   BP Resp SpO2   18 2232 149/76 18 97 %   18 2221 133/74 18 98 %   18 2202 154/78 18 98 %   18 2146 133/77 18 98 %   18 2132 128/70 18 98 %   18 2127 141/75 18 98 %   18 2101 123/69 18 98 %   187 121/67 18 99 %   18 113/59 18 99 %   18 126/60 - -   18 110/65 - -   18 105/56 - -   18 109/67 - -   18 104/59 - -   18 106/60 - -   18 102/57 18 97 %   18 94/51 18 98 %   18 95/53 18 97 %   18 1950 97/54 18 98 %   18 1945 99/57 18 98 %     Gen: A&O, NAD  CV:  RRR, no murmurs  Pulm:  CTAB, no wheezes or crackles  Abd:  Soft, non-tender in upper abdomen  Ext:  Patellar reflexes 1+ b/l, no clonus b/l, 1+ LE edema b/l    I/O:  I/O last 3 completed shifts:  In: 2763.75 [P.O.:1080; I.V.:1683.75]  Out: 4600 [Urine:4600]    A/P:  Ana Lilia Arrington is a 33 year old  on PPD# 0 s/p , with pregnancy complicated by preeclampsia with severe features.    Preeclampsia with severe features   - BP: normotensive to low mild range.   - UOP: 200 cc/hr most recently, strict Is/Os.    - Symptoms: Denies   - HELLP labs wnl, last checked this PM.   - Mag sulfate for seizure prophylaxis: S/p 4 g > 2g/hr, no s/s of magnesium toxicity.    - Next clinical mag check at 0400.    Postpartum:  - Routine postpartum care    Roxanne Davenport MD  Ob/Gyn, PGY-3  2018, 11:42 PM

## 2018-06-09 NOTE — ANESTHESIA PROCEDURE NOTES
Epidural Procedure Note    Staff:     Anesthesiologist:  MEKA SANTIAGO    Resident/CRNA:  APOORVA PERLA    Procedure performed by resident/CRNA in the presence of a teaching physician    Location: OB     Procedure start time:  6/8/2018 7:15 PM     Procedure end time:  6/8/2018 7:32 PM   Pre-procedure checklist:   patient identified, IV checked, site marked, risks and benefits discussed, informed consent, monitors and equipment checked, pre-op evaluation, at physician/surgeon's request and post-op pain management      Correct Patient: Yes      Correct Position: Yes      Correct Site: Yes      Correct Procedure: Yes      Correct Laterality:  Yes    Site Marked:  Yes  Procedure:     Procedure:  Epidural catheter    ASA:  3    Diagnosis:  Labor    Position:  Sitting    Sterile Prep: chloraprep      Insertion site:  L4-5    Local skin infiltration:  1% lidocaine    amount (mL):  5    Approach:  Midline    Needle gauge (G):  17    Needle Length (in):  3.5    Block Needle Type:  Touhy    Injection Technique:  LORT saline    REUBEN at (cm):  6    Attempts:  2    Redirects:  0    Catheter gauge (G):  19    Catheter threaded easily: Yes      Threaded to cm at skin:  11    Threaded in epidural space (cm):  5    Paresthesias:  No    Aspiration negative for Heme or CSF: Yes       Local anesthetic:  Lidocaine 1.5% w/ 1:200,000 epinephrine    Test dose time:  19:25    Test dose negative for signs of intravascular, subdural or intrathecal injection: Yes

## 2018-06-09 NOTE — PLAN OF CARE
Problem: Patient Care Overview  Goal: Plan of Care/Patient Progress Review  Outcome: Improving  Iris and baby transferred to postpartum unit at 2255 via wheelchair and baby in Iris' arms after completion of immediate recovery period. Iris transferred to room 7124 and baby and parents transferred to room 7121. Patient and baby's parents oriented to room and report given to MARTINE Carnes who assumes patient care. Iris and baby in stable condition upon transfer.

## 2018-06-09 NOTE — PROGRESS NOTES
[facility]  Magnesium Check Note    S:   Doing well.  Denies headache, vision changes/spots in vision, chest pain, shortness of breath, RUQ or epigastric pain.    O:  Patient Vitals for the past 4 hrs:   BP Temp Temp src Pulse Resp   18 1500 129/83 98  F (36.7  C) Oral 70 16     Gen: A&O, NAD  CV:  RRR, no murmurs  Pulm:  CTAB, no wheezes or crackles  Abd:  Soft, non-tender in upper abdomen  Ext:  Patellar reflexes 1+ b/l, no clonus b/l, 1+ LE edema b/l    I/O:  I/O last 3 completed shifts:  In: 3777.43 [P.O.:600; I.V.:3177.43]  Out: 2778 [Urine:2510; Blood:268]    A/P:  Ana Lilia Arrington is a 33 year old  on PPD#1 s/p , with pregnancy complicated by preeclampsia with severe features.    Preeclampsia with severe features   - BP: normotensive to mild range   - UOP: adequate, strict Is/Os, daily weights   - Symptoms: Denies   - HELLP labs wnl, last checked this PM.   - Mag sulfate for seizure prophylaxis: S/p 4 g > 2g/hr, no s/s of magnesium toxicity, lvl 6.0  Postpartum:  - Routine postpartum care    Magda Santoro MD  Ob/Gyn, PGY-3

## 2018-06-09 NOTE — DISCHARGE SUMMARY
[facility] Discharge Summary    Ana Lilia Arrington MRN# [id number]   Age: 33 year old YOB: 1985     Date of Admission:  2018  Date of Discharge:  6/10/2018  Admitting Physician:  Terrie Wilkerson MD  Discharge Physician:  Dr. Wilkerson    Admit Dx:   - Intrauterine pregnancy at 39w2d   - Preeclampsia with severe features  - GBS positive    Discharge Dx:  - Same as above, s/p     Procedures:  - Spontaneous vaginal delivery  - Epidural analgesia    Admit HPI/Labor Course:  Ana Lilia Arrington is a 33 year old  gestational carrier presented at 39w1d with severe range blood pressures and proteinuria. Magnesium prophylaxis was started for pre-eclampsia with severe features. Labs were normal. Induction was held overnight due to staffing issues and patient stability. Blood pressures were normal from then on. GBS positive. Penicillin was given and was adequate. AROM was performed. Contractions did not shortly follow and pitocin was started. She requested and received epidural. There was one two-minute deceleration of the fetal heart rate tracing and patient stated she felt more pressure. She was complete and +3. Patient did not labor down. She pushed effectively. She delivered a viable female infant with Apgars 8/9 over a first degree laceration. Weight is 2540 grams Delayed cord clamping was performed. Cord was clamped and cut. Infant was handed to the mother, who was present for delivery, for skin-to-skin. Placenta delivered rapidly with active management, intact, three vessel cord. First degree laceration was repaired with 3-0 vicryl in one box stitch. Epidural was sufficient analgesia for repair. Please see her Admission H&P and Delivery Summary for further details.    Postpartum Course:  Her postpartum course was uncomplicated. She was continued on IV magnesium for seizure prophylaxis for 24 hours postpartum which she tolerated without difficulty.  Her blood  pressures were normotensive to mild range so she was discharged without any additional BP medications. On PPD#2, she was meeting all of her postpartum goals and deemed stable for discharge. She was voiding without difficulty, tolerating a regular diet without nausea and vomiting, her pain was well controlled on oral pain medicines and her lochia was appropriate. Her hemoglobin prior to delivery was 11.8 and after delivery was 11.4. Her Rh status was positive, and Rhogam was not indicated.     Discharge Medications:     Review of your medicines      START taking       Dose / Directions    acetaminophen 325 MG tablet   Commonly known as:  TYLENOL        Dose:  650 mg   Take 2 tablets (650 mg) by mouth every 4 hours as needed for mild pain or fever (greater than or equal to 38  C /100.4  F (oral) or 38.5  C/ 101.4  F (core).)   Quantity:  40 tablet   Refills:  0       ibuprofen 600 MG tablet   Commonly known as:  ADVIL/MOTRIN        Dose:  600 mg   Take 1 tablet (600 mg) by mouth every 6 hours as needed for moderate pain   Quantity:  30 tablet   Refills:  1         STOP taking          prenatal multivitamin plus iron 27-0.8 MG Tabs per tablet                Where to get your medicines      Some of these will need a paper prescription and others can be bought over the counter. Ask your nurse if you have questions.     You don't need a prescription for these medications      acetaminophen 325 MG tablet     ibuprofen 600 MG tablet           Discharge/Disposition:  Ana Lilia Arrington was discharged to home in stable condition with the following instructions/medications:  1) Call for temperature > 100.4, bright red vaginal bleeding >1 pad an hour x 2 hours, foul smelling vaginal discharge, pain not controlled by usual oral pain meds, persistent nausea and vomiting not controlled on medications  2) She was instructed to follow-up with her primary OB in 6 weeks for a routine postpartum visit and in 1 week for a blood pressure  check.    # Discharge Pain Plan:   - During her hospitalization, Ana Lilia experienced pain due to labor and delivery.  The pain plan for discharge was discussed with Ana Lilia and the plan was created in a collaborative fashion.    - Pharmacologic adjuvants:  NSAIDs and Acetaminophen    Magda Santoro MD   OB/GYN PGY-3    Physician Attestation   I, Terrie Wilkerson, personally examined and evaluated this patient.  I discussed the patient with the medical student and/or resident and care team, and agree with the assessment and plan of care as documented in the note of 06/10/18  [date].      I personally reviewed vital signs, medications, labs and exam.    Key findings: Doing well. BPs all normal or in mild range. Desires DC. Has OTCs at home. Will have BP check later this week. Precautions reviewed.   Terrie Wilkerson MD  Date of Service (when I saw the patient): 06/10/18

## 2018-06-09 NOTE — PLAN OF CARE
Problem: Patient Care Overview  Goal: Plan of Care/Patient Progress Review  Outcome: Therapy, progress toward functional goals as expected  PP assessment WNL ex borderline elevated pressures, not treatable at this time, Dr. Davenport is aware see flow sheet for BP values. Other vital signs stable.  Intake and output remains appropriate with strict I &O. Denies signs or symptoms of pre-eclampsia at this time.  Pt. Denies pain or the need for  Ibuprofen or tylenol for incisional pain-see MAR. No further concerns, will continue with pt plan of care.

## 2018-06-09 NOTE — PLAN OF CARE
Problem: Patient Care Overview  Goal: Plan of Care/Patient Progress Review  Outcome: No Change  Patient doing very well. Full assessment and VS WDL. Tolerating magnesium gtt. No clonus noted. Denies any symptoms of pre-eclampsia. Up ad fabien in room and in hallway. Spent quite a bit of time with baby and intended parents. Seems very happy. Denies pain.

## 2018-06-10 VITALS
HEART RATE: 75 BPM | OXYGEN SATURATION: 99 % | SYSTOLIC BLOOD PRESSURE: 134 MMHG | TEMPERATURE: 98.6 F | DIASTOLIC BLOOD PRESSURE: 92 MMHG | RESPIRATION RATE: 18 BRPM | BODY MASS INDEX: 32.95 KG/M2 | HEIGHT: 64 IN | WEIGHT: 193 LBS

## 2018-06-10 RX ORDER — IBUPROFEN 600 MG/1
600 TABLET, FILM COATED ORAL EVERY 6 HOURS PRN
Qty: 30 TABLET | Refills: 1 | COMMUNITY
Start: 2018-06-10 | End: 2021-07-05

## 2018-06-10 RX ORDER — ACETAMINOPHEN 325 MG/1
650 TABLET ORAL EVERY 4 HOURS PRN
Qty: 40 TABLET | Refills: 0 | COMMUNITY
Start: 2018-06-10 | End: 2023-09-07

## 2018-06-10 NOTE — PLAN OF CARE
Problem: Patient Care Overview  Goal: Plan of Care/Patient Progress Review  Outcome: Adequate for Discharge Date Met: 06/10/18  Pt planning for d/c to home now. BP mild range today, appropriate diuresis, denies pre-e symptoms. Vaginal delivery discharge instructions reviewed, pt denies concerns. Discussed pt's history of post-partum depression, reviewed common symptoms, and how to reach out for help. Agrees to scheduling a BP check in clinic within one week, will also schedule 6wk PP visit.

## 2018-06-10 NOTE — PROGRESS NOTES
[facility]   Postpartum Note    Name:  Ana Lilia Arrington  MRN: [id number]    S: Patient is doing well.  Denies pain.  Tolerating regular diet without nausea or vomiting.  Ambulating without dizziness.  Lochia is normal, similar to menses.  Voiding spontaneously.  Denies headache, vision changes, RUQ pain, chest pain, SOB, increasing edema.    O:   Patient Vitals for the past 24 hrs:   BP Temp Temp src Pulse Resp   18 2330 128/87 98.2  F (36.8  C) Oral 70 16   18 2130 124/80 - - 68 -   185 135/83 98.1  F (36.7  C) Oral 72 16   18 1500 129/83 98  F (36.7  C) Oral 70 16   18 1000 (!) 139/91 98  F (36.7  C) Oral 71 16     Gen:  Resting comfortably, NAD  CV:  RRR, no murmur  Pulm:  CTAB, no wheezes  Abd:  Soft, appropriately ttp, non-distended. Fundus below umbilicus, firm and non-tender.  Ext:  non-tender, 1+ LE edema b/l, 1+ patellar and brachioradialis reflexes, no clonus b/l    I/O last 3 completed shifts:  In: 2300 [P.O.:2300]  Out: 3685 [Urine:3685]    Hgb:   Hemoglobin   Date Value Ref Range Status   2018 11.4 (L) 11.7 - 15.7 g/dL Final       Assessment/Plan:  Ana Lilia Arrington is a 33 year old  on PPD#2 s/p , doing well in the postpartum period.    # Routine postpartum management  Pain: Well-controlled with ibuprofen and tylenol  Hgb: 11.8> mL> 11.4. Asymptomatic  GI:  PRN bowel regimen.   :  Voiding spontaneously, no issues.   Rh: Positive, Rhogam not indicated.   Rubella: Immune, MMR not indicated.   Feed: N/A, gestational carrier.    BC: Not discussed this morning.     # Pre-eclampsia with severe features:  - s/p 24 hrs IV mag   - BPs normotensive to mild range.  Continue to monitor and start medications PRN.   - HELLP labs wnl  - Symptoms: Denies  - Strict Is/Os, daily weights.  Weight down 7 lb.     # Pain Assessment:  Current Pain Score 2018   Patient currently in pain? denies   Pain location -   Pain descriptors -    - Ana Lilia is experiencing pain due to vaginal delivery. Pain management was discussed and the plan was created in a collaborative fashion.  Iris's response to the current recommendations: engaged  - Please see the plan for pain management as documented above    Dispo: Anticipate home today    Magda Santoro MD  Ob/Gyn, PGY-3          Physician Attestation   I, Terrie Wilkerson, personally examined and evaluated this patient.  I discussed the patient with the medical student and/or resident and care team, and agree with the assessment and plan of care as documented in the note of 06/10/18  [date].      I personally reviewed vital signs, medications, labs and exam.    Key findings: Doing well. BPs all normal or in mild range. Desires DC. Has OTCs at home. Will have BP check later this week. Precautions reviewed.   Terrie Wilkerson MD  Date of Service (when I saw the patient): 06/10/18

## 2018-06-10 NOTE — PROVIDER NOTIFICATION
06/10/18 0917   Provider Notification   Provider Name/Title Dr Wilkerson   Method of Notification In Department   Notification Reason Status Update   Pt planning for d/c this AM. Doing well.

## 2018-06-10 NOTE — PLAN OF CARE
Problem: Patient Care Overview  Goal: Plan of Care/Patient Progress Review  Outcome: Improving  Data: Vital signs within normal limits. Postpartum checks within normal limits - see flow record. Patient eating and drinking normally. Patient able to empty bladder independently and is up ambulating. No apparent signs of infection. Patient performing self cares.  Action: Denies pain - patient stated she was comfortable. Patient education done about discharge. See flow record.  Mag infusion stopped at 2025.  Response: Support persons  and children present.   Plan: Anticipate discharge on Jae Estela 10, 2018.

## 2018-06-10 NOTE — PLAN OF CARE
Problem: Patient Care Overview  Goal: Plan of Care/Patient Progress Review  Outcome: Improving  Data: Vital signs within normal limits. Postpartum checks within normal limits - see flow record. Patient eating and drinking normally. Patient able to empty bladder independently and is up ambulating. No apparent signs of infection. . Patient performing self cares.  Action: Denies pain - patient stated she was comfortable. Patient education done about discharge. See flow record.  Response: Support persons  and children present.   Plan: Anticipate discharge later today.

## 2018-06-15 ENCOUNTER — PRENATAL OFFICE VISIT (OUTPATIENT)
Dept: OBGYN | Facility: CLINIC | Age: 33
End: 2018-06-15
Payer: COMMERCIAL

## 2018-06-15 VITALS
TEMPERATURE: 98.5 F | DIASTOLIC BLOOD PRESSURE: 94 MMHG | WEIGHT: 188 LBS | HEART RATE: 74 BPM | BODY MASS INDEX: 32.27 KG/M2 | SYSTOLIC BLOOD PRESSURE: 150 MMHG

## 2018-06-15 PROCEDURE — 99212 OFFICE O/P EST SF 10 MIN: CPT | Mod: 24 | Performed by: OBSTETRICS & GYNECOLOGY

## 2018-06-15 NOTE — MR AVS SNAPSHOT
"              After Visit Summary   6/15/2018    Ana Lilia Arrington    MRN: [id number]           Patient Information     Date Of Birth          1985        Visit Information        Provider Department      6/15/2018 3:30 PM Valentina Stevens MD [facility]        Today's Diagnoses     Postpartum hypertension    -  1       Follow-ups after your visit        Who to contact     If you have questions or need follow up information about today's clinic visit or your schedule please contact [facility] directly at [phone].  Normal or non-critical lab and imaging results will be communicated to you by SoupQubeshart, letter or phone within 4 business days after the clinic has received the results. If you do not hear from us within 7 days, please contact the clinic through SoupQubeshart or phone. If you have a critical or abnormal lab result, we will notify you by phone as soon as possible.  Submit refill requests through Pixta or call your pharmacy and they will forward the refill request to us. Please allow 3 business days for your refill to be completed.          Additional Information About Your Visit        MyChart Information     Pixta lets you send messages to your doctor, view your test results, renew your prescriptions, schedule appointments and more. To sign up, go to [email or web address] . Click on \"Log in\" on the left side of the screen, which will take you to the Welcome page. Then click on \"Sign up Now\" on the right side of the page.     You will be asked to enter the access code listed below, as well as some personal information. Please follow the directions to create your username and password.     Your access code is: [email or web address]  Expires: 2018  1:39 PM     Your access code will  in 90 days. If you need help or a new code, please call your [facility] or [phone].        Care EveryWhere ID     This is your Care EveryWhere ID. This could be used by other " organizations to access your [place] medical records  [id number]        Your Vitals Were     Pulse Temperature BMI (Body Mass Index)             74 98.5  F (36.9  C) (Oral) 32.27 kg/m2          Blood Pressure from Last 3 Encounters:   06/15/18 (!) 150/94   06/10/18 (!) 134/92   06/07/18 (!) 166/103    Weight from Last 3 Encounters:   06/15/18 188 lb (85.3 kg)   06/10/18 193 lb (87.5 kg)   06/07/18 202 lb (91.6 kg)              Today, you had the following     No orders found for display       Primary Care Provider Office Phone # Fax #    [facility] SONU [facility] [phone]       [address] [address]  [facility] [address]        Equal Access to Services     MISHA ALAN : Hadii davon sappo Sorachel, waaxda luqadaha, qaybta kaalmada adeclaritayanellie, duane hewitt . So [facility] [phone].    ATENCIÓN: Si habla español, tiene a alberto disposición servicios gratuitos de asistencia lingüística. Ernesto al [phone].    We comply with applicable federal civil rights laws and [place] laws. We do not discriminate on the basis of race, color, national origin, age, disability, sex, sexual orientation, or gender identity.            Thank you!     Thank you for choosing [facility]  for your care. Our goal is always to provide you with excellent care. Hearing back from our patients is one way we can continue to improve our services. Please take a few minutes to complete the written survey that you may receive in the mail after your visit with us. Thank you!             Your Updated Medication List - Protect others around you: Learn how to safely use, store and throw away your medicines at www.disposemymeds.org.          This list is accurate as of 6/15/18  3:46 PM.  Always use your most recent med list.                   Brand Name Dispense Instructions for use Diagnosis    acetaminophen 325 MG tablet    TYLENOL    40 tablet    Take 2 tablets (650 mg) by mouth every 4  hours as needed for mild pain or fever (greater than or equal to 38? C /100.4? F (oral) or 38.5? C/ 101.4? F (core).)    Indication for care in labor and delivery, antepartum       ibuprofen 600 MG tablet    ADVIL/MOTRIN    30 tablet    Take 1 tablet (600 mg) by mouth every 6 hours as needed for moderate pain    Indication for care in labor and delivery, antepartum

## 2018-06-15 NOTE — PROGRESS NOTES
S; Iris Angeles Arrington is a 33 year old  who is 8 days S/P .  She was induced for severe pre-e due to severe range BPs.  After delivery her Bps were stable in the mildly elevated range and she was d/c'd home on no medications.  She reports feeling good, no issues.  She previously had HA (day admitted to hospital), but not currently.  She has been checking her BS daily and they are in the normal range.  She was a gestational carrier, is doing well through the transition.  They have one final court date next week and then the transition will be complete.  Her milk came in a few days ago, was pretty uncomfortable, but better now.    O: BP (!) 150/94  Pulse 74  Temp 98.5  F (36.9  C) (Oral)  Wt 188 lb (85.3 kg)  BMI 32.27 kg/m2    Gen: NAD  Abd; SNT  Ex; no calf tend    A/P; postpartum hypertension   Doing well.  BPs stable, no need for medication.  Continue to monitor daily and call if >160/110, or if HA returns.  Otherwise, can RTC for 6week pp check.  Questions answered.    SIMBA ASHBY MD

## 2018-06-15 NOTE — NURSING NOTE
"Chief Complaint   Patient presents with     RECHECK     BP check       Initial BP (!) 150/94  Pulse 74  Temp 98.5  F (36.9  C) (Oral)  Wt 188 lb (85.3 kg)  BMI 32.27 kg/m2 Estimated body mass index is 32.27 kg/(m^2) as calculated from the following:    Height as of 18: 5' 4\" (1.626 m).    Weight as of this encounter: 188 lb (85.3 kg).  BP completed using cuff size: regular        The following HM Due: NONE      The following patient reported/Care Every where data was sent to:  P ABSTRACT QUALITY INITIATIVES [[id number]]        patient has appointment for today  Mariana Solis                "

## 2018-06-29 ENCOUNTER — PATIENT OUTREACH (OUTPATIENT)
Dept: CARE COORDINATION | Facility: CLINIC | Age: 33
End: 2018-06-29

## 2018-06-29 NOTE — PROGRESS NOTES
Clinic Care Coordination Contact  [facility]/Voicemail       Clinical Data: Care Coordinator Outreach  Outreach attempted x 1.  Left message on voicemail with call back information and requested return call.  Plan:  Care Coordinator will continue to follow. .BENITEZ Conner    Care Coordinator  [facility] ,[facility]   [phone]

## 2018-08-01 PROBLEM — B95.1 POSITIVE GBS TEST: Status: RESOLVED | Noted: 2018-05-25 | Resolved: 2018-08-01

## 2018-08-03 ENCOUNTER — PRENATAL OFFICE VISIT (OUTPATIENT)
Dept: OBGYN | Facility: CLINIC | Age: 33
End: 2018-08-03
Payer: COMMERCIAL

## 2018-08-03 VITALS
SYSTOLIC BLOOD PRESSURE: 105 MMHG | TEMPERATURE: 98.7 F | WEIGHT: 187 LBS | BODY MASS INDEX: 32.1 KG/M2 | HEART RATE: 58 BPM | DIASTOLIC BLOOD PRESSURE: 68 MMHG

## 2018-08-03 PROBLEM — Z33.3 PREGNANT STATE, GESTATIONAL CARRIER: Status: RESOLVED | Noted: 2017-11-27 | Resolved: 2018-08-03

## 2018-08-03 PROCEDURE — 87624 HPV HI-RISK TYP POOLED RSLT: CPT | Performed by: OBSTETRICS & GYNECOLOGY

## 2018-08-03 PROCEDURE — G0145 SCR C/V CYTO,THINLAYER,RESCR: HCPCS | Performed by: OBSTETRICS & GYNECOLOGY

## 2018-08-03 PROCEDURE — 99207 ZZC POST PARTUM EXAM: CPT | Performed by: OBSTETRICS & GYNECOLOGY

## 2018-08-03 NOTE — MR AVS SNAPSHOT
After Visit Summary   8/3/2018    Ana Lilia Arrington    MRN: [id number]           Patient Information     Date Of Birth          1985        Visit Information        Provider Department      8/3/2018 2:15 PM Marybel Monroe MD [facility]        Today's Diagnoses     Routine postpartum follow-up    -  1       Follow-ups after your visit        Who to contact     If you have questions or need follow up information about today's clinic visit or your schedule please contact [facility] directly at [phone].  Normal or non-critical lab and imaging results will be communicated to you by Oneloudr Productionshart, letter or phone within 4 business days after the clinic has received the results. If you do not hear from us within 7 days, please contact the clinic through Kereost or phone. If you have a critical or abnormal lab result, we will notify you by phone as soon as possible.  Submit refill requests through Surprise Ride or call your pharmacy and they will forward the refill request to us. Please allow 3 business days for your refill to be completed.          Additional Information About Your Visit        MyChart Information     Surprise Ride gives you secure access to your electronic health record. If you see a primary care provider, you can also send messages to your care team and make appointments. If you have questions, please call your primary care clinic.  If you do not have a primary care provider, please call [phone] and they will assist you.        Care EveryWhere ID     This is your Care EveryWhere ID. This could be used by other organizations to access your [facility] medical records  [id number]        Your Vitals Were     Pulse Temperature BMI (Body Mass Index)             58 98.7  F (37.1  C) (Oral) 32.1 kg/m2          Blood Pressure from Last 3 Encounters:   08/03/18 105/68   06/15/18 (!) 150/94   06/10/18 (!) 134/92    Weight from Last 3 Encounters:   08/03/18 187 lb  (84.8 kg)   06/15/18 188 lb (85.3 kg)   06/10/18 193 lb (87.5 kg)              We Performed the Following     HPV High Risk Types DNA Cervical     Pap imaged thin layer screen with HPV - recommended age 30 - 65 years (select HPV order below)        Primary Care Provider Office Phone # Fax #    SONU Ortiz [phone]       [address]  [facility] [address]        Equal Access to Services     MISHA ALAN : Hadii aad ku hadasho Soomaali, waaxda luqadaha, qaybta kaalmada adeegyada, waxay idiin hayaan adeeg kharash larehan . So [facility] [phone].    ATENCIÓN: Si habla español, tiene a alberto disposición servicios gratuitos de asistencia lingüística. [facility] [phone].    We comply with applicable federal civil rights laws and [place] laws. We do not discriminate on the basis of race, color, national origin, age, disability, sex, sexual orientation, or gender identity.            Thank you!     Thank you for choosing [facility]  for your care. Our goal is always to provide you with excellent care. Hearing back from our patients is one way we can continue to improve our services. Please take a few minutes to complete the written survey that you may receive in the mail after your visit with us. Thank you!             Your Updated Medication List - Protect others around you: Learn how to safely use, store and throw away your medicines at www.disposemymeds.org.          This list is accurate as of 8/3/18 11:59 PM.  Always use your most recent med list.                   Brand Name Dispense Instructions for use Diagnosis    acetaminophen 325 MG tablet    TYLENOL    40 tablet    Take 2 tablets (650 mg) by mouth every 4 hours as needed for mild pain or fever (greater than or equal to 38? C /100.4? F (oral) or 38.5? C/ 101.4? F (core).)    Indication for care in labor and delivery, antepartum       ibuprofen 600 MG tablet    ADVIL/MOTRIN    30 tablet    Take 1 tablet  (600 mg) by mouth every 6 hours as needed for moderate pain    Indication for care in labor and delivery, antepartum

## 2018-08-03 NOTE — NURSING NOTE
"Chief Complaint   Patient presents with     Post Partum Exam       Initial /68 (BP Location: Left arm, Patient Position: Sitting, Cuff Size: Adult Regular)  Pulse 58  Temp 98.7  F (37.1  C) (Oral)  Wt 187 lb (84.8 kg)  BMI 32.1 kg/m2 Estimated body mass index is 32.1 kg/(m^2) as calculated from the following:    Height as of 18: 5' 4\" (1.626 m).    Weight as of this encounter: 187 lb (84.8 kg).  BP completed using cuff size: regular        The following HM Due: pap smear      The following patient reported/Care Every where data was sent to:  P ABSTRACT QUALITY INITIATIVES [[id number]]  SALEEM Yip           "

## 2018-08-04 ASSESSMENT — PATIENT HEALTH QUESTIONNAIRE - PHQ9: SUM OF ALL RESPONSES TO PHQ QUESTIONS 1-9: 0

## 2018-08-04 NOTE — PROGRESS NOTES
OB GYN CLINIC VISIT  2018    CC: postpartum visit    SUBJECTIVE:  Ana Lilia Arrington is a 33 year old female  here for a postpartum visit.  She had a  on 18 delivering a healthy baby girl weighing 5 lbs 9 oz at 39w2d.      Today's Depression Rating was 4    delivery complications:  Yes, induced for pre-eclampsia with severe features  breast feeding:  No  bladder problems:  No  bowel problems/hemorrhoids:  No  episiotomy/laceration/incision healed? Yes  vaginal flow:  None  [place]:  No  contraception:  Considering her options  emotional adjustment:  doing well and happy.  Has spoken with parents in [place] a few times since delivery and last received phototherapy which was 1-month-old  back to work: Yes, full-time, no issues    Current Outpatient Prescriptions   Medication     acetaminophen (TYLENOL) 325 MG tablet     ibuprofen (ADVIL/MOTRIN) 600 MG tablet     No current facility-administered medications for this visit.      Facility-Administered Medications Ordered in Other Visits   Medication     bupivacaine (MARCAINE) 0.125 % injection (diluted from stock concentration by MD or CRNA)     lidocaine-EPINEPHrine 1.5 %-1:[id number] injection       OBJECTIVE:  Blood pressure 105/68, pulse 58, temperature 98.7  F (37.1  C), temperature source Oral, weight 187 lb (84.8 kg), unknown if currently breastfeeding.   General - pleasant female in no acute distress.  Breast - no nodularity, asymmetry or nipple discharge bilaterally.  Abdomen - soft, nontender, nondistended, no hepatosplenomegaly.  Pelvic - EG: normal adult female, BUS: within normal limits, Vagina: well rugated, no discharge, Cervix: normal, no lesions or CMT, Uterus: firm, normal sized and nontender, Adnexae: no masses or tenderness.  Rectovaginal - deferred.    ASSESSMENT:  33 year old  with normal postpartum exam    PLAN:  Discussed kegel exercises   May resume normal activities without restrictions  Pap smear was  done today    Discussed  options for contraception.  Patient is considering Mirena IUD.  's pamphlets provided, as well as website address for bedsider.org.  Patient and her  do not plan to have any more children between them, but she has not ruled out being a gestational carrier again in the future.    Return to clinic in one year for an annual, when she decides her plans for contraception or as needed.    Marybel Monroe MD

## 2018-08-07 ENCOUNTER — PATIENT OUTREACH (OUTPATIENT)
Dept: CARE COORDINATION | Facility: CLINIC | Age: 33
End: 2018-08-07

## 2018-08-07 LAB
COPATH REPORT: NORMAL
PAP: NORMAL

## 2018-08-07 NOTE — PROGRESS NOTES
Clinic Care Coordination Contact  [facility]/Voicemail       Clinical Data: Care Coordinator Outreach  Outreach attempted x 1.  Left message on voicemail with call back information and requested return call.  Plan: Care Coordinator will do no further outreaches at this time.  BENITEZ Conner    Care Coordinator  [facility]   [phone]

## 2018-08-08 LAB
FINAL DIAGNOSIS: NORMAL
HPV HR 12 DNA CVX QL NAA+PROBE: NEGATIVE
HPV16 DNA SPEC QL NAA+PROBE: NEGATIVE
HPV18 DNA SPEC QL NAA+PROBE: NEGATIVE
SPECIMEN DESCRIPTION: NORMAL
SPECIMEN SOURCE CVX/VAG CYTO: NORMAL

## 2018-11-26 ENCOUNTER — OFFICE VISIT (OUTPATIENT)
Dept: OBGYN | Facility: CLINIC | Age: 33
End: 2018-11-26
Payer: COMMERCIAL

## 2018-11-26 VITALS
TEMPERATURE: 98.2 F | DIASTOLIC BLOOD PRESSURE: 76 MMHG | WEIGHT: 182 LBS | SYSTOLIC BLOOD PRESSURE: 116 MMHG | BODY MASS INDEX: 31.24 KG/M2 | HEART RATE: 56 BPM

## 2018-11-26 DIAGNOSIS — Z31.69 PRE-CONCEPTION COUNSELING: Primary | ICD-10-CM

## 2018-11-26 PROCEDURE — 99213 OFFICE O/P EST LOW 20 MIN: CPT | Performed by: OBSTETRICS & GYNECOLOGY

## 2018-11-26 NOTE — MR AVS SNAPSHOT
After Visit Summary   11/26/2018    Ana Lilia Arrington    MRN: [id number]           Patient Information     Date Of Birth          1985        Visit Information        Provider Department      11/26/2018 9:45 AM Marybel Monroe MD [facility]        Today's Diagnoses     Pre-conception counseling    -  1       Follow-ups after your visit        Who to contact     If you have questions or need follow up information about today's clinic visit or your schedule please contact [facility] directly at [phone].  Normal or non-critical lab and imaging results will be communicated to you by Cardizehart, letter or phone within 4 business days after the clinic has received the results. If you do not hear from us within 7 days, please contact the clinic through CareFamilyt or phone. If you have a critical or abnormal lab result, we will notify you by phone as soon as possible.  Submit refill requests through [facility] or call your pharmacy and they will forward the refill request to us. Please allow 3 business days for your refill to be completed.          Additional Information About Your Visit        MyChart Information     [facility] gives you secure access to your electronic health record. If you see a primary care provider, you can also send messages to your care team and make appointments. If you have questions, please call your primary care clinic.  If you do not have a primary care provider, please call [phone] and they will assist you.        Care EveryWhere ID     This is your Care EveryWhere ID. This could be used by other organizations to access your [place] medical records  [id number]        Your Vitals Were     Pulse Temperature BMI (Body Mass Index)             56 98.2  F (36.8  C) (Oral) 31.24 kg/m2          Blood Pressure from Last 3 Encounters:   11/26/18 116/76   08/03/18 105/68   06/15/18 (!) 150/94    Weight from Last 3 Encounters:   11/26/18 182 lb  (82.6 kg)   08/03/18 187 lb (84.8 kg)   06/15/18 188 lb (85.3 kg)              Today, you had the following     No orders found for display       Primary Care Provider Office Phone # Fax #    SONU Ortiz [employer] [phone]       [address]  [facility] [address]        Equal Access to Services     CARLITO ALAN : Hadii aad ku hadasho Soomaali, waaxda luqadaha, qaybta kaalmada adeegyada, waxay idiin hayaan adeeg kharash la'aan . So [facility] [phone].    ATENCIÓN: Si britt maurer, tiene a alberto disposición servicios gratuitos de asistencia lingüística. Llame al [phone].    We comply with applicable federal civil rights laws and [place] laws. We do not discriminate on the basis of race, color, national origin, age, disability, sex, sexual orientation, or gender identity.            Thank you!     Thank you for choosing [facility]  for your care. Our goal is always to provide you with excellent care. Hearing back from our patients is one way we can continue to improve our services. Please take a few minutes to complete the written survey that you may receive in the mail after your visit with us. Thank you!             Your Updated Medication List - Protect others around you: Learn how to safely use, store and throw away your medicines at www.disposemymeds.org.          This list is accurate as of 11/26/18  3:25 PM.  Always use your most recent med list.                   Brand Name Dispense Instructions for use Diagnosis    acetaminophen 325 MG tablet    TYLENOL    40 tablet    Take 2 tablets (650 mg) by mouth every 4 hours as needed for mild pain or fever (greater than or equal to 38? C /100.4? F (oral) or 38.5? C/ 101.4? F (core).)    Indication for care in labor and delivery, antepartum       ibuprofen 600 MG tablet    ADVIL/MOTRIN    30 tablet    Take 1 tablet (600 mg) by mouth every 6 hours as needed for moderate pain    Indication for care in labor and  delivery, antepartum

## 2018-11-26 NOTE — Clinical Note
Please write a letter that this patient is medically cleared to carry pregnancy as a gestational carrier after 6/8/2019 and email it to the patient.  Her email was confirmed at her visit today.  Marybel Monroe MD

## 2018-11-26 NOTE — PROGRESS NOTES
CC:  Family planning discussion.    Ana Lilia Arrington is a 33 year old  was a gestational carrier and a vaginal delivery of baby girl in 2018 after induction for pre-eclampsia with severe features at 39w1d.  Other than pre-eclampsia, her pregnancy and delivery were uncomplicated.    She is interested in being a gestational carrier again in the future and is wondering if that would be safe for her.  We did discuss increased risk of pre-eclampsia given her history, but I wouldn't say this precludes her from pregnancy in the future.  Would get baseline labs and start baby ASA early in the pregnancy to decrease the risk of recurrence.    We did discuss the recommendation to delay pregnancy for at least 1 year following delivery, so I wouldn't recommend she attempt pregnancy again before 2019.  She is aware of this recommendation and is only trying to get things in order in advance.  May wait as long as 2 years following delivery to get pregnant again.    She requested a copy of her prenatal and delivery records, which was provided today.  Additionally, a letter of medical clearance for pregnancy was also provided starting in 2019.    Marybel Monroe MD    Please note greater than 50% of this 15 minute appointment were spent in counseling with the patient on issues described above, including pregnancy with history of pre-eclampsia with severe features, recommended inter pregnancy intervals.

## 2018-11-26 NOTE — NURSING NOTE
"Chief Complaint   Patient presents with     Consult     family planning       Initial /76 (BP Location: Left arm, Patient Position: Sitting, Cuff Size: Adult Regular)  Pulse 56  Temp 98.2  F (36.8  C) (Oral)  Wt 182 lb (82.6 kg)  BMI 31.24 kg/m2 Estimated body mass index is 31.24 kg/(m^2) as calculated from the following:    Height as of 18: 5' 4\" (1.626 m).    Weight as of this encounter: 182 lb (82.6 kg).  BP completed using cuff size: regular    Questioned patient about current smoking habits.  Pt. has never smoked.          The following HM Due: NONE      The following patient reported/Care Every where data was sent to:  P ABSTRACT QUALITY INITIATIVES [27120]  SALEEM Yip           "

## 2018-11-26 NOTE — LETTER
November 26, 2018      [address] [place] [facility]  [address] [address]            To Whom it May Concern,     Ana Lilia Arrington is a patient under my professional care.  This patient is medically cleared to carry pregnancy as a gestational carrier after 6/8/2019. If you have any questions, please contact the clinic at [phone].      Sincerely,             Marybel Monroe MD

## 2019-04-09 ENCOUNTER — OFFICE VISIT (OUTPATIENT)
Dept: PSYCHOLOGY | Facility: CLINIC | Age: 34
End: 2019-04-09
Attending: PSYCHOLOGIST
Payer: COMMERCIAL

## 2019-04-09 DIAGNOSIS — F43.29 ADJUSTMENT REACTION WITH MIXED EMOTIONAL FEATURES: Primary | ICD-10-CM

## 2019-04-09 PROCEDURE — 40000114 ZZH STATISTIC NO CHARGE CLINIC VISIT

## 2019-04-18 ENCOUNTER — OFFICE VISIT (OUTPATIENT)
Dept: PSYCHOLOGY | Facility: CLINIC | Age: 34
End: 2019-04-18
Attending: PSYCHOLOGIST
Payer: COMMERCIAL

## 2019-04-18 DIAGNOSIS — F43.29 ADJUSTMENT REACTION WITH MIXED EMOTIONAL FEATURES: Primary | ICD-10-CM

## 2019-04-18 PROCEDURE — 40000114 ZZH STATISTIC NO CHARGE CLINIC VISIT

## 2019-05-02 NOTE — PROGRESS NOTES
IDENTIFYING INFORMATION  Ana Lilia Arrington is a 34-year-old  woman who is as an . She was seen with her , Raheel, a 49-year-old white man employed as a  at [employer].   PRESENTING PROBLEM   Ana Lilia Arrington () was seen for psychological evaluation and education as a repeat gestational carrier. As a first-time gestational carrier, Mrs. Arrington was vaginally delivered a girl in 2018. There were no problems during the pregnancy although she developed preeclampsia at the end which required induction. Mrs. Arrington has been selected to be a gestational carrier for a couple   CURRENT SITUATION  Ana Lilia Arrington and her  live in their own home in [place], [place] with their three children.  MARITAL HISTORY   Ana Lilia and Raheel Arrington met on a [facility] in  where Mr. Arrington was operating a [employer] owned by his parents. Mrs. Arrington and her sister were vacationing. The couple dated for a year and  on 2006. It is the first marriage for both partners. The couple describes themselves as Christians. Carly Arrington have three children: Estephanie age 13, Marcella age 11, and Edy age 9. All her children were planned pregnancies, successfully delivered vaginally, and all healthy emotionally and physically. Mrs. Arrington noted that she put off being a gestational carrier until her youngest was old enough to understand the concept of gestational carrier and not be confused about  me giving away his baby brother or sister .   EDUCATION   Ana Lilia Arrington completed the seventh grade in [place]. She quit school in  because her parents  and, as the eldest daughter, she was needed at home.   FAMILY-OF-ORIGIN HISTORY   Ana Lilia Arrington is the second of five children raised in a poor Amish family in [place]. Her parents are alive but  in . Her father, Kirby aged 54, is a semi- in a company his father started. He  remarried and has a 17-year-old daughter from his second marriage. Mrs. Arrington is closer to her mother than her father--especially since parents  divorce.  Mrs. Arrington s mother, Sharon age 55, has not remarried and does not work. She lives with her sons and their families in [place]. Mrs. Arrington s eldest brother, Niko aged 35 is  and is a   at the family kar[employer] business. Crystal aged 31 has one child from a previous unmarried relationship and is now  with one child. She lives in [place] where she is a  at a dental clinic. Kirby aged 27 is  with one child. He lives with his mother in [place] and is also a  at the family karime company. Prema aged 24 is  with two children. 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 Mrs. Arrington is close to her family and stays in touch with them especially her mother and sisters. Mrs. Arrington and her eldest brother, Kirby, financially support their mother. Mrs. Arrington denied any family history of health problems, legal difficulties, chemical dependency or abuse, and/or psychological problems. Her mother and sister know that she was a gestational carrier--not her brothers or father. They were very supportive during her first gestational carrier pregnancy--although her mother didn t exactly understand the concept until she learned that Noemí Olmstead and Bhanu Peng had the help of a gestational carrier with their third and fourth. (An observation that made Mrs. Arrington and her  chuckle even as they recounted the story again.)       PSYCHOLOGICAL TESTING   Ana Lilia Arrington  [place] Multiphasic Personality Inventory-2 (MMPI-2)   Date of testin2019  [place] code: 5 +1-8479/362:0# KL +-/F:  Ana Lilia Arrington s MMPI-2 is profile although it should be noted that elevations on the L and K scales indicate a desire to present herself in the  best possible way--which is consistent with the reasons Mrs. Arrington took the MMPI-2 within the context of being a repeat gestational carrier. The only elevation on the Basic Scales Profile was Mf Scale (79) which that is not within normal range. The elevation on this scale seems to indicate a less feminine interest pattern--which was endorsed by Mrs. Arrington. On the Supplementary Scales Profile there were two minor elevations: Es (68) and O-H (70). Elevation on the Ego Strength (Es) indicate emotional stability and absence of psychological problems. Persons with this profile are self-confident and capable to withstanding high levels of psychological distress. They are determined and persistent and easily accepted by others. Elevations on the Over-Controlled Hostility Scale (O-H) are consistent with highly socialized individuals. There were no elevations on the Restructured Scales Profile, Content Scales Profile, or Psy-5 Scales Profile. The results of this MMPI-2 were reviewed with Mrs. Arrington. The testing results were reviewed with Mrs. Arrington who commented  That s me. That is exactly me.   Her , Sal Arrington, also had a valid MMPI-2 that was within normal range on all scales.   PSYCHOLOGICAL PROFILE   Ana Lilia Arrington presented dressed casually but well-groomed. She is a personable woman who was forthcoming and cooperative. Russian is her first language, but she speaks English fluently. (Her  noted that he speaks American and not Russian). Hygiene appeared to be adequate.  Speech and eye contact were within normal range.  She was oriented to time, person, and place. There was no evidence of disturbed thought, content, or process, either observed or reported. Affect was variable and appropriate.  Intelligence was not formally tested but appeared to be within normal range.   Insight and judgment appeared to be good.  Ana Lilia Arrington has a permanent resident visa dating from 2007 which was recently updated. She would  like to pursue citizenship, but the process is complicated and expensive just now. Ana Lilia Arrington () denied any major health problems--despite her problems with toxemia during her last two pregnancies. Her reported BMI is 28.1.  Mrs. Arrington is aware that she is overweight offering the explanation that she is not a year out from her last pregnancy during which she weighed 203 lbs. She now weighs 174# so has successfully lost a significant amount of weight and her weight loss regime has continued.  Mrs. Arrington She seemed less aware that being overweight is a potential risk factor for hypertension/toxemia.  Mrs. Arrington estimated that she has three alcoholic beverages a year. She has never used tobacco or recreational drugs. She denied even trying drugs.   Mrs. Arrington noted that she had  some sort of  postpartum problem  after all of her pregnancies but worst with her last. She was offered help (medication or psychotherapy) by her OB after her pregnancy but rejected it because  I don t want to have a mental illness.   She doesn t trust the medications or psychotherapy. Instead, she concluded that her mood improved when she stopped breastfeeding.  However, it should be noted this was not the case issue during her last pregnancy as it was a GC pregnancy. She noted that the sad feelings lingered for nearly a year after her third pregnancy (with her son) and then suddenly  lifted   Just one day I wasn t sad and they it was I was sad for two days. Sort of like that but I still don t know how or why.  Mrs. Arrington never equated her history of postpartum  sadness  as possibly untreated baby blues. And because she didn t think it was a problem and/or didn t like the help options she was given in the past; she didn t pursue mental health help after her first pregnancy.    Initially, Ana Lilia Arrington reported that she had no problems with any of her pregnancies.  All of her three children were planned.  Her third pregnancy was planned in  hopes having a boy (which they did). There were no reported problems with any conception or delivery. At birth all three of her babies were healthy and normal.  All three children are now healthy and well-adjusted. Still she felt very sad, maybe depressed after the birth of her first child.  She did speak to her physician about sadness after the birth of her firstborn.  She was offered medication and/or psychotherapy both of which she rejected because  I don t want to have a mental illness.  On her own she discovered that her mood improved when she quit breastfeeding and she found this to be the case following the births of her other two children--although the sadness last for a year after the birth of her third child (and only son). She reports not noticing any depression or significant sad feelings following her GC pregnancy.  Because her pregnancies were easy, Mrs. Arrington began thinking about being a GC a few years after the birth of her youngest. She eventually decided to postpone being a GC until her children were old enough to understand the process and the basic fact the pregnancy was not their sibling but belonged to another family. Ana Lilia Arrington s first GC pregnancy was in 2017 when her youngest was 7 years old.  The pregnancy was followed by the same OB who had delivered her last pregnancy, [facility]bel Monroe MD at the [facility]. Mrs. Arrington consulted Dr. Monroe late in 2017 about being a repeat GC (gestational carrier). Dr. Monroe encouraged her to wait a year so that her pregnancies were, at minimum, a year apart.  Mrs. Arrington s last pregnancy was delivered on 6/8/2018.    Prior to proceeding as a gestational carrier Mrs. Arrington and her  underwent psychological testing and an interview with Moshe Acosta. (A copy of this summary and psychological testing was requested but was not provided.)  Both  and Mrs. Arrington agreed that the interview with Espinoza Dave was not helpful. They wished he had reviewed  her psychological testing results and provided educational materials and emotional support e.g., someone to talk to during or after the pregnancy. Although Mrs. Arrington received considerable emotional support from the IPs (especially the intended mother), there wasn t a lot of additional support apart from Mrs. Arrington s sister, but they live in other countries. Mrs. Arrington found the high doses of Lupron difficulty as well as the embryo transfers which were done in [place].  Ana Lilia Arrington explained that that difficulty was that there was more than one embryo transfer (ET) on the same day. When she awoke from the anesthesia, she was told that  one of the embryos fell away  so she would have to be anesthetized in order to undertake a second attempt at pregnancy despite her  s strong misgivings. But again, when Mrs. Arrington awoke from the anesthesia, she was told again that  one of the embryos fell away  so she would again have to undergo a repeat anesthesia and ET.  Mrs. Arrington complied (despite her  concerns and her own apprehensions) and did become pregnant.   In preparation for her second GC pregnancy, Mrs. Arrington met with a RE at [place] and was reassured that severe morning sickness was due to high doses of Lupron and the dosage would be reduced next time.  She was reassured that she would not have to undergo ETs on the same day.  Ana Lilia and Raheel Murphy very pleased with their first intended parents and are hopeful they will have the same good luck with their second IPs.    Ana Lilia Arrington was able to work throughout her first GC pregnancy and got great support from her co-workers who purposely made her work responsibilities easier for her during the pregnancy. Her  and children look at the GC pregnancy as a family experience and, as such, provided Mrs. Arrington with lots of support and encouragement. The family is enthusiastic about Mrs. Arrington being a repeat GC.  Mrs. Arrington also has the support of her  sisters as well as her mother this time who understands the process much better than during her first GC pregnancy.  Mrs. Arrington explained with her chuckle,  My mother said one day that Noemí Olmstead had to have a GC and so now she is good with it.  Because Ana Lilia Arrington reported postpartum  sad feelings  following all of her pregnancies but worst after her third, she was encouraged  (along with her ) to monitor her mood throughout the upcoming GC #2 pregnancy and postpartum and to talk to her OB or me if she notices even a light wobble and return to see me if she wobbles or just needs a quiet place to regroup.  Both  and Mrs. Arrington were positive and receptive to this idea.     ASSESSMENT DSM-V DIAGNOSIS  DSM-I: Adjustment reaction with mixed emotional features  DSM-II: Deferred  DSM-III: Reported BMI= 28.1 (overweight); History of toxemia during last pregnancy; Preparing for repeat gestational carrier (5th pregnancy,)  DISM-IV: None reported at this time  DSM-V: 91   TIME: 120 minutes intake psychotherapy   PLAN:     It was determined that there were no psychosocial issues precluding Ana Lilia Arrington s proceeding with her plan to be a repeat gestational carrier.   o Ana Lilia Arrington reports a history of postpartum  blues  or depression for which she was offered treatment (i.e., medication and/or psychotherapy) but she rejected. She was encouraged to seek assistance during and after pregnancy.   o Ana Lilia Arrington express reassurance when offered psychosocial support during her second GC pregnancy.    o The sole issue of caution MAY be Mrs. Arrington s elevated BMI and history of hypertension/toxemia during her last pregnancy (also GC pregnancy).    A copy of this report and MMPI-2 testing will be sent to Aidee Mei at [facility] in [place], [place] per Mrs. Arrington s request    Eileen Saldivar, PhD  Licensed Psychologist

## 2019-05-02 NOTE — PROGRESS NOTES
IDENTIFYING INFORMATION  Ana Lilia Arrington is a 34-year-old  woman who is as an . She was seen with her , Raheel, a 49-year-old white man employed as a  at [employer].   PRESENTING PROBLEM   Ana Lilia Arrington () was seen for psychological evaluation and education as a repeat gestational carrier. As a first-time gestational carrier, Mrs. Arringotn was vaginally delivered a girl in 2018. There were no problems during the pregnancy although she developed preeclampsia at the end which required induction. Mrs. Arrington has been selected to be a gestational carrier for a couple   CURRENT SITUATION  Ana Lilia Arrington and her  live in their own home in [place], [place] with their three children.  MARITAL HISTORY   Ana Lilia and Raheel Arrington met on a [facility] in  where Mr. Arrington was operating a [employer] owned by his parents. Mrs. Arrington and her sister were vacationing. The couple dated for a year and  on 2006. It is the first marriage for both partners. The couple describes themselves as Christians. Carly Arrington have three children: Estephanie age 13, Marcella age 11, and Edy age 9. All her children were planned pregnancies, successfully delivered vaginally, and all healthy emotionally and physically. Mrs. Arrington noted that she put off being a gestational carrier until her youngest was old enough to understand the concept of gestational carrier and not be confused about  me giving away his baby brother or sister .   EDUCATION   Ana Lilia Arrington completed the seventh grade in [place]. She quit school in  because her parents  and, as the eldest daughter, she was needed at home.   FAMILY-OF-ORIGIN HISTORY   Ana Lilia Arrington is the second of five children raised in a poor Taoism family in [place]. Her parents are alive but  in . Her father, Kirby aged 54, is a semi- in a company his father started. He  remarried and has a 17-year-old daughter from his second marriage. Mrs. Arrington is closer to her mother than her father--especially since parents  divorce.  Mrs. Arrington s mother, Sharon age 55, has not remarried and does not work. She lives with her sons and their families in [place]. Mrs. Arrington s eldest brother, Niko aged 35 is  and is a   at the family [employer] business. Crystal aged 31 has one child from a previous unmarried relationship and is now  with one child. She lives in [place] where she is a  at a dental clinic. Kirby aged 27 is  with one child. He lives with his mother in [place] and is also a  at the family kar[employer] company. Prema aged 24 is  with two children. 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 Mrs. Arrington is close to her family and stays in touch with them especially her mother and sisters. Mrs. Arrington and her eldest brother, Kirby, financially support their mother. Mrs. Arrington denied any family history of health problems, legal difficulties, chemical dependency or abuse, and/or psychological problems. Her mother and sister know that she was a gestational carrier--not her brothers or father. They were very supportive during her first gestational carrier pregnancy--although her mother didn t exactly understand the concept until she learned that Noemí Olmstead and Bhanu Peng had the help of a gestational carrier with their third and fourth. (An observation that made Mrs. Arrington and her  chuckle even as they recounted the story again.)       PSYCHOLOGICAL TESTING   Ana Lilia Arrington  [place] Multiphasic Personality Inventory-2 (MMPI-2)   Date of testin2019  [place] code: 5 +[id number]/362:[id number]# KL +-/F:  Ana Lilia Arrington s MMPI-2 is profile although it should be noted that elevations on the L and K scales indicate a desire to present herself in the  best possible way--which is consistent with the reasons Mrs. Arrington took the MMPI-2 within the context of being a repeat gestational carrier. The only elevation on the Basic Scales Profile was Mf Scale (79) which that is not within normal range. The elevation on this scale seems to indicate a less feminine interest pattern--which was endorsed by Mrs. Arrington. On the Supplementary Scales Profile there were two minor elevations: Es (68) and O-H (70). Elevation on the Ego Strength (Es) indicate emotional stability and absence of psychological problems. Persons with this profile are self-confident and capable to withstanding high levels of psychological distress. They are determined and persistent and easily accepted by others. Elevations on the Over-Controlled Hostility Scale (O-H) are consistent with highly socialized individuals. There were no elevations on the Restructured Scales Profile, Content Scales Profile, or Psy-5 Scales Profile. The results of this MMPI-2 were reviewed with Mrs. Arrington. The testing results were reviewed with Mrs. Arrington who commented  That s me. That is exactly me.   Her , Sal Arrington, also had a valid MMPI-2 that was within normal range on all scales.   PSYCHOLOGICAL PROFILE   Ana Lilia Arrington presented dressed casually but well-groomed. She is a personable woman who was forthcoming and cooperative. Burkinan is her first language, but she speaks English fluently. (Her  noted that he speaks American and not Burkinan). Hygiene appeared to be adequate.  Speech and eye contact were within normal range.  She was oriented to time, person, and place. There was no evidence of disturbed thought, content, or process, either observed or reported. Affect was variable and appropriate.  Intelligence was not formally tested but appeared to be within normal range.   Insight and judgment appeared to be good.  Ana Lilia Arrington has a permanent resident visa dating from 2007 which was recently updated. She would  like to pursue citizenship, but the process is complicated and expensive just now. Ana Lilia Arrington () denied any major health problems--despite her problems with toxemia during her last two pregnancies. Her reported BMI is 28.1.  Mrs. Arrington is aware that she is overweight offering the explanation that she is not a year out from her last pregnancy during which she weighed 203 lbs. She now weighs 174# so has successfully lost a significant amount of weight and her weight loss regime has continued.  Mrs. Arrington She seemed less aware that being overweight is a potential risk factor for hypertension/toxemia.  Mrs. Arrington estimated that she has three alcoholic beverages a year. She has never used tobacco or recreational drugs. She denied even trying drugs.   Mrs. Arrington noted that she had  some sort of  postpartum problem  after all of her pregnancies but worst with her last. She was offered help (medication or psychotherapy) by her OB after her pregnancy but rejected it because  I don t want to have a mental illness.   She doesn t trust the medications or psychotherapy. Instead, she concluded that her mood improved when she stopped breastfeeding.  However, it should be noted this was not the case issue during her last pregnancy as it was a GC pregnancy. She noted that the sad feelings lingered for nearly a year after her third pregnancy (with her son) and then suddenly  lifted   Just one day I wasn t sad and they it was I was sad for two days. Sort of like that but I still don t know how or why.  Mrs. Arrington never equated her history of postpartum  sadness  as possibly untreated baby blues. And because she didn t think it was a problem and/or didn t like the help options she was given in the past; she didn t pursue mental health help after her first pregnancy.    Initially, Ana Lilia Arrington reported that she had no problems with any of her pregnancies.  All of her three children were planned.  Her third pregnancy was planned in  hopes having a boy (which they did). There were no reported problems with any conception or delivery. At birth all three of her babies were healthy and normal.  All three children are now healthy and well-adjusted. Still she felt very sad, maybe depressed after the birth of her first child.  She did speak to her physician about sadness after the birth of her firstborn.  She was offered medication and/or psychotherapy both of which she rejected because  I don t want to have a mental illness.  On her own she discovered that her mood improved when she quit breastfeeding and she found this to be the case following the births of her other two children--although the sadness last for a year after the birth of her third child (and only son). She reports not noticing any depression or significant sad feelings following her GC pregnancy.  Because her pregnancies were easy, Mrs. Arrington began thinking about being a GC a few years after the birth of her youngest. She eventually decided to postpone being a GC until her children were old enough to understand the process and the basic fact the pregnancy was not their sibling but belonged to another family. Ana Lilia Arrington s first GC pregnancy was in 2017 when her youngest was 7 years old.  The pregnancy was followed by the same OB who had delivered her last pregnancy, [facility]bel Monroe MD at the [facility]. Mrs. Arrington consulted Dr. Monroe late in 2017 about being a repeat GC (gestational carrier). Dr. Monroe encouraged her to wait a year so that her pregnancies were, at minimum, a year apart.  Mrs. Arrington s last pregnancy was delivered on 6/8/2018.    Prior to proceeding as a gestational carrier Mrs. Arrington and her  underwent psychological testing and an interview with Moshe Acosta. (A copy of this summary and psychological testing was requested but was not provided.)  Both  and Mrs. Arrington agreed that the interview with Espinoza Dave was not helpful. They wished he had reviewed  her psychological testing results and provided educational materials and emotional support e.g., someone to talk to during or after the pregnancy. Although Mrs. Arrington received considerable emotional support from the IPs (especially the intended mother), there wasn t a lot of additional support apart from Mrs. Arrington s sister, but they live in other countries. Mrs. Arrington found the high doses of Lupron difficulty as well as the embryo transfers which were done in [place].  Ana Lilia Arrington explained that that difficulty was that there was more than one embryo transfer (ET) on the same day. When she awoke from the anesthesia, she was told that  one of the embryos fell away  so she would have to be anesthetized in order to undertake a second attempt at pregnancy despite her  s strong misgivings. But again, when Mrs. Arrington awoke from the anesthesia, she was told again that  one of the embryos fell away  so she would again have to undergo a repeat anesthesia and ET.  Mrs. Arrington complied (despite her  concerns and her own apprehensions) and did become pregnant.   In preparation for her second GC pregnancy, Mrs. Arrington met with a RE at [facility] and was reassured that severe morning sickness was due to high doses of Lupron and the dosage would be reduced next time.  She was reassured that she would not have to undergo ETs on the same day.  Ana Lilia and Raheel Murphy very pleased with their first intended parents and are hopeful they will have the same good luck with their second IPs.    Ana Lilia Arrington was able to work throughout her first GC pregnancy and got great support from her co-workers who purposely made her work responsibilities easier for her during the pregnancy. Her  and children look at the GC pregnancy as a family experience and, as such, provided Mrs. Arrington with lots of support and encouragement. The family is enthusiastic about Mrs. Arrington being a repeat GC.  Mrs. Arrington also has the support of her  sisters as well as her mother this time who understands the process much better than during her first GC pregnancy.  Mrs. Arrington explained with her chuckle,  My mother said one day that Noemí Olmstead had to have a GC and so now she is good with it.  Because Ana Lilia Arrington reported postpartum  sad feelings  following all of her pregnancies but worst after her third, she was encouraged  (along with her ) to monitor her mood throughout the upcoming GC #2 pregnancy and postpartum and to talk to her OB or me if she notices even a light wobble and return to see me if she wobbles or just needs a quiet place to regroup.  Both  and Mrs. Arrington were positive and receptive to this idea.     ASSESSMENT DSM-V DIAGNOSIS  DSM-I: Adjustment reaction with mixed emotional features  DSM-II: Deferred  DSM-III: Reported BMI= 28.1 (overweight); History of toxemia during last pregnancy; Preparing for repeat gestational carrier (5th pregnancy,)  DISM-IV: None reported at this time  DSM-V: 91   TIME: 120 minutes intake psychotherapy   PLAN:     It was determined that there were no psychosocial issues precluding Ana Lilia Arrington s proceeding with her plan to be a repeat gestational carrier.   o Ana Lilia Arrington reports a history of postpartum  blues  or depression for which she was offered treatment (i.e., medication and/or psychotherapy) but she rejected. She was encouraged to seek assistance during and after pregnancy.   o Ana Lliia Arrington express reassurance when offered psychosocial support during her second GC pregnancy.    o The sole issue of caution MAY be Mrs. Arrington s elevated BMI and history of hypertension/toxemia during her last pregnancy (also GC pregnancy).    A copy of this report and MMPI-2 testing will be sent to Aidee Mei at [facility] in [place], [place] per Mrs. Arrington s request    Eileen Saldivar, PhD  Licensed Psychologist

## 2019-11-08 ENCOUNTER — HEALTH MAINTENANCE LETTER (OUTPATIENT)
Age: 34
End: 2019-11-08

## 2020-12-06 ENCOUNTER — HEALTH MAINTENANCE LETTER (OUTPATIENT)
Age: 35
End: 2020-12-06

## 2021-01-26 ENCOUNTER — OFFICE VISIT (OUTPATIENT)
Dept: FAMILY MEDICINE | Facility: CLINIC | Age: 36
End: 2021-01-26
Payer: COMMERCIAL

## 2021-01-26 VITALS
HEART RATE: 71 BPM | WEIGHT: 197 LBS | OXYGEN SATURATION: 97 % | DIASTOLIC BLOOD PRESSURE: 80 MMHG | BODY MASS INDEX: 33.81 KG/M2 | TEMPERATURE: 98.6 F | SYSTOLIC BLOOD PRESSURE: 120 MMHG

## 2021-01-26 DIAGNOSIS — G44.209 TENSION HEADACHE: Primary | ICD-10-CM

## 2021-01-26 DIAGNOSIS — M94.0 COSTOCHONDRITIS: ICD-10-CM

## 2021-01-26 PROCEDURE — 99203 OFFICE O/P NEW LOW 30 MIN: CPT | Performed by: NURSE PRACTITIONER

## 2021-01-26 RX ORDER — CYCLOBENZAPRINE HCL 5 MG
5 TABLET ORAL 3 TIMES DAILY PRN
Qty: 30 TABLET | Refills: 1 | Status: SHIPPED | OUTPATIENT
Start: 2021-01-26 | End: 2021-07-12

## 2021-01-26 NOTE — PATIENT INSTRUCTIONS
Youtube: Guided relaxation; sleep hypnosis   -be sure to try to walk 30 minutes per day while you are at work and listen to positive music.  -try to stretch muscles at night

## 2021-01-26 NOTE — PROGRESS NOTES
Assessment & Plan     Tension headache    - cyclobenzaprine (FLEXERIL) 5 MG tablet; Take 1 tablet (5 mg) by mouth 3 times daily as needed for muscle spasms    Costochondritis  -follow up if symptoms worsening      30 minutes spent on the date of the encounter doing chart review, history and exam, documentation and further activities as noted above           See Patient Instructions  Patient Instructions   Youtube: Guided relaxation; sleep hypnosis   -be sure to try to walk 30 minutes per day while you are at work and listen to positive music.  -try to stretch muscles at night       No follow-ups on file.    SONU Ortiz CNP  [facility] is a 35 year old who presents to clinic today for the following health issues  HPI       rib Pain   Onset/Duration: 1 week   Description:   Character: Sharp  Location: left breast sometimes right breast  Radiation: Back, Shoulder and neck  Intensity: moderate  Progression of Symptoms:  worsening  Accompanying Signs & Symptoms:  Fever/Chills: no  Gas/Bloating: no  Nausea: no  Vomitting: no  Diarrhea: no  Constipation: no  Dysuria or Hematuria: no  History:   Trauma: no  Previous similar pain: no  Previous tests done: none  Precipitating factors:   Does the pain change with:     Food: not applicable    Bowel Movement: not applicable    Urination: not applicable   Other factors:  n/a  Therapies tried and outcome: Tylenol-improvement  No LMP recorded.    Has noticed a lot of muscle tension and soreness. She works in cleaning buildings at night. Has not been sleeping well. Under a lot of stress right now with missing a house payment, and kids at home for distance learning. She notices headache in temples occasionally. No injury to neck, back or chest. No cough, fever, chills, no exposure to sick individuals.    Review of Systems   Constitutional, HEENT, cardiovascular, pulmonary, gi and gu systems are negative, except as  otherwise noted.      Objective    There were no vitals taken for this visit.  There is no height or weight on file to calculate BMI.  Physical Exam   GENERAL: healthy, alert and no distress  HENT: ear canals and TM's normal, nose and mouth without ulcers or lesions  NECK: no adenopathy, no asymmetry, masses, or scars and thyroid normal to palpation  RESP: lungs clear to auscultation - no rales, rhonchi or wheezes  Breast exam: bilateral within normal limits; no tenderness  CV: regular rate and rhythm, normal S1 S2, no S3 or S4, no murmur, click or rub, no peripheral edema and peripheral pulses strong  MS: tenderness to palpation paracervical spine bilaterally  NEURO: Normal strength and tone, mentation intact and speech normal

## 2021-05-17 ENCOUNTER — VIRTUAL VISIT (OUTPATIENT)
Dept: OBGYN | Facility: CLINIC | Age: 36
End: 2021-05-17
Payer: COMMERCIAL

## 2021-05-17 DIAGNOSIS — N64.4 BREAST PAIN: ICD-10-CM

## 2021-05-17 DIAGNOSIS — Z31.7 ENCOUNTER FOR PROCREATIVE MANAGEMENT AND COUNSELING FOR GESTATIONAL CARRIER: Primary | ICD-10-CM

## 2021-05-17 PROCEDURE — 99213 OFFICE O/P EST LOW 20 MIN: CPT | Mod: TEL | Performed by: OBSTETRICS & GYNECOLOGY

## 2021-05-17 NOTE — PROGRESS NOTES
Ana Lilia is a 36 year old who is being evaluated via a billable telephone visit.      What phone number would you like to be contacted at? [phone]  How would you like to obtain your AVS? MyChart    1. Encounter for procreative management and counseling for gestational carrier  Reviewed steps of a hydrosonogram and described what to expect with the procedure.  Order was placed and will message  to help get this formally scheduled.  Discussed I had be happy to do it on a call day if that helped with scheduling.  - US Hysterosonography; Future    2. Breast pain  Note made for appointment to follow her HSN in order to perform breast exam.  Generally breasts pain is not a concerning sign, but does warrant evaluation.    RTC for HSN, sooner prn.    Marybel Monroe MD        Subjective   Ana Lilia is a 36 year old who presents for the following health issues:  Needs HSN to begin second pregnancy as gestational carrier  accompanied by her spouse:    HPI   Considering carrying another pregnancy, for a different couple this time.  Before COVID, the IP tried IVF between the two of them and it failed.  Then they decided to seek out a gestational carrier.  Ana Lilia has no questions regarding this process, but needs to have HSN.  Has reached out to different clinics as recommended by her infertility clinic, but has not had any luck being able to schedule this procedure.  Hoping it can be ordered and performed at our clinic.    Pain on left side of breast.  Gets it sometimes and it goes to shoulder, back, head.  Wondering if she could be examined for this finding at the time of her HSN.      Review of Systems   Constitutional, HEENT, cardiovascular, pulmonary, gi and gu systems are negative, except as otherwise noted.      Objective           Vitals:  No vitals were obtained today due to virtual visit.    Physical Exam   healthy, alert and no distress  PSYCH: Alert and oriented times 3; coherent speech, normal   rate and  volume, able to articulate logical thoughts, able   to abstract reason, no tangential thoughts, no hallucinations   or delusions  Her affect is normal  RESP: No cough, no audible wheezing, able to talk in full sentences  Remainder of exam unable to be completed due to telephone visits        Phone call duration: 10 minutes

## 2021-05-22 DIAGNOSIS — Z11.59 ENCOUNTER FOR SCREENING FOR OTHER VIRAL DISEASES: ICD-10-CM

## 2021-06-16 ENCOUNTER — TELEPHONE (OUTPATIENT)
Dept: OBGYN | Facility: CLINIC | Age: 36
End: 2021-06-16

## 2021-06-16 NOTE — TELEPHONE ENCOUNTER
On call 7/5 is fine by me.  Her initial one was scheduled when I was on call as well.    Marybel Monroe MD

## 2021-06-16 NOTE — TELEPHONE ENCOUNTER
Anything that would line up when I'm on call?  If not, would there be an early morning appointment sometime when I'm post-call?    Thanks,  Marybel Monroe MD

## 2021-06-16 NOTE — TELEPHONE ENCOUNTER
Patient called to reschedule hydrosonogram. Had to cancel last time because she had not gotten her period yet. Patient stated that she started her period today so she was looking to schedule sometime next week.     Informed the patient that the soonest available with both the US schedule and [facility] was 07/09/21. Patient asked that I send a message to SK letting her know that patient is trying to reschedule the hydro.     Routing to [facility].       Crystal

## 2021-07-05 ENCOUNTER — ANCILLARY PROCEDURE (OUTPATIENT)
Dept: ULTRASOUND IMAGING | Facility: CLINIC | Age: 36
End: 2021-07-05
Attending: OBSTETRICS & GYNECOLOGY
Payer: COMMERCIAL

## 2021-07-05 ENCOUNTER — OFFICE VISIT (OUTPATIENT)
Dept: OBGYN | Facility: CLINIC | Age: 36
End: 2021-07-05
Attending: OBSTETRICS & GYNECOLOGY
Payer: COMMERCIAL

## 2021-07-05 DIAGNOSIS — N84.0 ENDOMETRIAL POLYP: ICD-10-CM

## 2021-07-05 DIAGNOSIS — Z31.7 ENCOUNTER FOR PROCREATIVE MANAGEMENT AND COUNSELING FOR GESTATIONAL CARRIER: Primary | ICD-10-CM

## 2021-07-05 DIAGNOSIS — Z31.7 ENCOUNTER FOR PROCREATIVE MANAGEMENT AND COUNSELING FOR GESTATIONAL CARRIER: ICD-10-CM

## 2021-07-05 LAB — HCG UR QL: NEGATIVE

## 2021-07-05 PROCEDURE — 81025 URINE PREGNANCY TEST: CPT | Performed by: OBSTETRICS & GYNECOLOGY

## 2021-07-05 PROCEDURE — 58340 CATHETER FOR HYSTEROGRAPHY: CPT | Performed by: OBSTETRICS & GYNECOLOGY

## 2021-07-05 PROCEDURE — 76831 ECHO EXAM UTERUS: CPT | Performed by: OBSTETRICS & GYNECOLOGY

## 2021-07-06 NOTE — PROGRESS NOTES
GYN CLINIC VISIT  7/5/2021    SALINE INFUSION SONOGRAM:  AnaL ilia Arrington is a 36 year old female who presents for saline infusion sonography because of failed embryo transfers in her journey to be gestation carrier again. The indication, as well as risks, benefits, and alternatives were reviewed with her prior to the procedure. 6/16/21 A urine pregnancy test was performed prior to the procedure and was negative.    Findings on Transvaginal Sonography  Uterus  Uterus:  Anteverted uterus with a myometrium possessing a smooth contour and homogeneous echogenicity without evidence of leiomyoma.    Fallopian Tubes  The fallopian tubes were not visualized, which would be consistent with the absence of a hydrosalpinx, but the presence of a hydrosalpinx cannot be completely excluded.    Posterior Cul-de-Sac  Minimal to no free fluid was noted in the cul-de-sac. Pelvic structures appeared mobile without findings suggestive of adhesion, and no specific tenderness was noted.    Saline Infusion Sonography  After chlorhexidine prep of the cervix, the SIS balloon-tipped catheter was cannulated easily through the cervical canal and into the main uterine cavity, and approximately 20 mL of sterile saline was infused while concurrent ultrasound was performed.  After infusion of normal saline, endometrial polyp was visualized.    The patient tolerated the procedure very well, and the findings were reviewed with her.    Discussed that I would typically recommend hysteroscopy for removal of a lesion like this.  Reviewed risk of procedure.  Risks include bleeding, infection, uterine perforation, cervical laceration, injury to other organs, and fluid overload.    Patient will download results from [facility] to share with fertility clinic.  If she would like me to perform hysteroscopy with morcellation, she will let me know.    Marybel Monroe MD

## 2021-07-08 ENCOUNTER — TELEPHONE (OUTPATIENT)
Dept: SURGERY | Facility: CLINIC | Age: 36
End: 2021-07-08

## 2021-07-08 ENCOUNTER — TELEPHONE (OUTPATIENT)
Dept: OBGYN | Facility: CLINIC | Age: 36
End: 2021-07-08

## 2021-07-08 DIAGNOSIS — Z01.818 PRE-OP EXAM: Primary | ICD-10-CM

## 2021-07-08 DIAGNOSIS — N84.0 ENDOMETRIAL POLYP: ICD-10-CM

## 2021-07-08 RX ORDER — NORETHINDRONE AND ETHINYL ESTRADIOL 1 MG-35MCG
KIT ORAL
COMMUNITY
Start: 2021-05-21 | End: 2021-07-12

## 2021-07-08 NOTE — TELEPHONE ENCOUNTER
Type of surgery: gyn  Location of surgery: [facility] OR  Date and time of surgery: 07/13/21 8AM  Surgeon: Mabel  Pre-Op Appt Date: 07/09/21 Kelly Meneses  Post-Op Appt Date: 07/26/21   Packet sent out: will  at lab appt  Pre-cert/Authorization completed:  Not Applicable  Date: 07/08/21     Crystal

## 2021-07-08 NOTE — TELEPHONE ENCOUNTER
Called patient and got her scheduled at [facility] for pre op physical tomorrow 07/09/21 at 9AM.     Crystal

## 2021-07-08 NOTE — TELEPHONE ENCOUNTER
[facility]    Phone Message    May a detailed message be left on voicemail: yes     Reason for Call: Other: Pt has an emergency surgery on 7/13 and writer is unable to schedule due to appts being available 7/15. Please reach out to the pt with a sooner date if able. Thank you.     Action Taken: Message routed to:  [facility] ([facility]  pac    Travel Screening: Not Applicable

## 2021-07-09 ENCOUNTER — OFFICE VISIT (OUTPATIENT)
Dept: FAMILY MEDICINE | Facility: CLINIC | Age: 36
End: 2021-07-09
Payer: COMMERCIAL

## 2021-07-09 VITALS
BODY MASS INDEX: 32.96 KG/M2 | OXYGEN SATURATION: 98 % | DIASTOLIC BLOOD PRESSURE: 82 MMHG | HEART RATE: 72 BPM | WEIGHT: 192 LBS | TEMPERATURE: 98.1 F | RESPIRATION RATE: 16 BRPM | SYSTOLIC BLOOD PRESSURE: 126 MMHG

## 2021-07-09 DIAGNOSIS — Z01.818 PREOP GENERAL PHYSICAL EXAM: Primary | ICD-10-CM

## 2021-07-09 DIAGNOSIS — N84.0 ENDOMETRIAL POLYP: ICD-10-CM

## 2021-07-09 DIAGNOSIS — Z11.59 ENCOUNTER FOR HEPATITIS C SCREENING TEST FOR LOW RISK PATIENT: ICD-10-CM

## 2021-07-09 LAB
BASOPHILS # BLD AUTO: 0 10E9/L (ref 0–0.2)
BASOPHILS NFR BLD AUTO: 0.3 %
DIFFERENTIAL METHOD BLD: ABNORMAL
EOSINOPHIL # BLD AUTO: 0.1 10E9/L (ref 0–0.7)
EOSINOPHIL NFR BLD AUTO: 1.6 %
ERYTHROCYTE [DISTWIDTH] IN BLOOD BY AUTOMATED COUNT: 17.3 % (ref 10–15)
HCT VFR BLD AUTO: 38.6 % (ref 35–47)
HCV AB SERPL QL IA: NONREACTIVE
HGB BLD-MCNC: 12.4 G/DL (ref 11.7–15.7)
LYMPHOCYTES # BLD AUTO: 4 10E9/L (ref 0.8–5.3)
LYMPHOCYTES NFR BLD AUTO: 44.4 %
MCH RBC QN AUTO: 24.8 PG (ref 26.5–33)
MCHC RBC AUTO-ENTMCNC: 32.1 G/DL (ref 31.5–36.5)
MCV RBC AUTO: 77 FL (ref 78–100)
MONOCYTES # BLD AUTO: 0.7 10E9/L (ref 0–1.3)
MONOCYTES NFR BLD AUTO: 7.5 %
NEUTROPHILS # BLD AUTO: 4.1 10E9/L (ref 1.6–8.3)
NEUTROPHILS NFR BLD AUTO: 46.2 %
PLATELET # BLD AUTO: 329 10E9/L (ref 150–450)
RBC # BLD AUTO: 5 10E12/L (ref 3.8–5.2)
WBC # BLD AUTO: 8.9 10E9/L (ref 4–11)

## 2021-07-09 PROCEDURE — 99214 OFFICE O/P EST MOD 30 MIN: CPT | Performed by: FAMILY MEDICINE

## 2021-07-09 PROCEDURE — 85025 COMPLETE CBC W/AUTO DIFF WBC: CPT | Performed by: FAMILY MEDICINE

## 2021-07-09 PROCEDURE — 86803 HEPATITIS C AB TEST: CPT | Performed by: FAMILY MEDICINE

## 2021-07-09 PROCEDURE — 36415 COLL VENOUS BLD VENIPUNCTURE: CPT | Performed by: FAMILY MEDICINE

## 2021-07-09 NOTE — PROGRESS NOTES
[facility] [address] [place] [address]  Phone: [phone]  Primary Provider: Gill Verma  Pre-op Performing Provider: JUAN DIEGO CASAREZ    PREOPERATIVE EVALUATION:  Today's date: 7/9/2021    Ana Lilia Arrington is a 36 year old female who presents for a preoperative evaluation.    Surgical Information:  Surgery/Procedure: Endometrial polyp  Surgery Location: [facility]  Surgeon: Dr. Marybel Monroe, DM  Surgery Date: 7/13/21  Time of Surgery:TBD  Where patient plans to recover: At home with family  Fax number for surgical facility: Note does not need to be faxed, will be available electronically in Epic.    Type of Anesthesia Anticipated: General    Assessment & Plan     The proposed surgical procedure is considered LOW risk.    Preop general physical exam  For removal of  Endometrial polyp  - CBC with platelets and differential    Encounter for hepatitis C screening test for low risk patient  - Hepatitis C Screen Reflex to HCV RNA Quant and Genotype    RECOMMENDATION:  APPROVAL GIVEN to proceed with proposed procedure, without further diagnostic evaluation.  }    Subjective     HPI related to upcoming procedure: Ana Lilia has been attempting pregnancy in order to be surrogate but was diagnosed with a endometrial polyp that may be reducing her ability to conceive. She has had 4 prior normal pregnancies and deliveries, last pregnancy also for surrogacy, complicated by hypertension.      No flowsheet data found.    Health Care Directive:  Preoperative Review of :   reviewed - no record of controlled substances prescribed.    Review of Systems  CONSTITUTIONAL: NEGATIVE for fever, chills, change in weight  INTEGUMENTARY/SKIN: NEGATIVE for worrisome rashes, moles or lesions  EYES: NEGATIVE for vision changes or irritation  ENT/MOUTH: NEGATIVE for ear, mouth and throat problems  RESP: NEGATIVE for significant cough or SOB  BREAST: NEGATIVE for masses, tenderness  or discharge  CV: NEGATIVE for chest pain, palpitations or peripheral edema  GI: NEGATIVE for nausea, abdominal pain, heartburn, or change in bowel habits  : NEGATIVE for frequency, dysuria, or hematuria  MUSCULOSKELETAL: NEGATIVE for significant arthralgias or myalgia  NEURO: NEGATIVE for weakness, dizziness or paresthesias  ENDOCRINE: NEGATIVE for temperature intolerance, skin/hair changes  HEME: NEGATIVE for bleeding problems  PSYCHIATRIC: NEGATIVE for changes in mood or affect    Patient Active Problem List    Diagnosis Date Noted     Endometrial polyp 07/08/2021     Priority: Medium     Added automatically from request for surgery [id number]       Need for Tdap vaccination 11/22/2017     Priority: Medium     Migraine 11/16/2015     Priority: Medium     CARDIOVASCULAR SCREENING; LDL GOAL LESS THAN 160 10/31/2010     Priority: Medium      Past Medical History:   Diagnosis Date     Hypertension     only during pregnancy     Migraine      Postpartum depression      Past Surgical History:   Procedure Laterality Date     NO HISTORY OF SURGERY       Current Outpatient Medications   Medication Sig Dispense Refill     acetaminophen (TYLENOL) 325 MG tablet Take 2 tablets (650 mg) by mouth every 4 hours as needed for mild pain or fever (greater than or equal to 38  C /100.4  F (oral) or 38.5  C/ 101.4  F (core).) 40 tablet 0     cyclobenzaprine (FLEXERIL) 5 MG tablet Take 1 tablet (5 mg) by mouth 3 times daily as needed for muscle spasms 30 tablet 1     DASETTA 1/35 1-35 MG-MCG tablet          Allergies   Allergen Reactions     Eggs      No Known Drug Allergies         Social History     Tobacco Use     Smoking status: Never Smoker     Smokeless tobacco: Never Used   Substance Use Topics     Alcohol use: No     Comment: none with pregnancy     Family History   Problem Relation Age of Onset     Family History Negative No family hx of      History   Drug Use No         Objective     /82 (BP Location: Right arm, Patient  Position: Sitting, Cuff Size: Adult Regular)   Pulse 72   Temp 98.1  F (36.7  C)   Resp 16   Wt 87.1 kg (192 lb)   LMP 05/17/2021 (Approximate)   SpO2 98%   Breastfeeding No   BMI 32.96 kg/m      Physical Exam    GENERAL APPEARANCE: healthy, alert and no distress     EYES: EOMI, PERRL     HENT: ear canals and TM's normal and nose and mouth without ulcers or lesions     NECK: no adenopathy, no asymmetry, masses, or scars and thyroid normal to palpation     RESP: lungs clear to auscultation - no rales, rhonchi or wheezes     CV: regular rates and rhythm, normal S1 S2, no S3 or S4 and no murmur, click or rub     ABDOMEN:  soft, nontender, no HSM or masses and bowel sounds normal     MS: extremities normal- no gross deformities noted, no evidence of inflammation in joints, FROM in all extremities.     SKIN: no suspicious lesions or rashes     NEURO: Normal strength and tone, sensory exam grossly normal, mentation intact and speech normal     PSYCH: mentation appears normal. and affect normal/bright     LYMPHATICS: No cervical adenopathy    Recent Labs   Lab Test 07/09/21  0834   HGB 12.4           Diagnostics:  Recent Results (from the past 24 hour(s))   CBC with platelets and differential    Collection Time: 07/09/21  8:34 AM   Result Value Ref Range    WBC 8.9 4.0 - 11.0 10e9/L    RBC Count 5.00 3.8 - 5.2 10e12/L    Hemoglobin 12.4 11.7 - 15.7 g/dL    Hematocrit 38.6 35.0 - 47.0 %    MCV 77 (L) 78 - 100 fl    MCH 24.8 (L) 26.5 - 33.0 pg    MCHC 32.1 31.5 - 36.5 g/dL    RDW 17.3 (H) 10.0 - 15.0 %    Platelet Count 329 150 - 450 10e9/L    Diff Method Automated Method     % Neutrophils 46.2 %    % Lymphocytes 44.4 %    % Monocytes 7.5 %    % Eosinophils 1.6 %    % Basophils 0.3 %    Absolute Neutrophil 4.1 1.6 - 8.3 10e9/L    Absolute Lymphocytes 4.0 0.8 - 5.3 10e9/L    Absolute Monocytes 0.7 0.0 - 1.3 10e9/L    Absolute Eosinophils 0.1 0.0 - 0.7 10e9/L    Absolute Basophils 0.0 0.0 - 0.2 10e9/L         Revised Cardiac Risk Index (RCRI):  The patient has the following serious cardiovascular risks for perioperative complications:   - No serious cardiac risks = 0 points     RCRI Interpretation: 0 points: Class I (very low risk - 0.4% complication rate)           Signed Electronically by: Kelly Meneses MD  Copy of this evaluation report is provided to requesting physician.

## 2021-07-09 NOTE — PATIENT INSTRUCTIONS
Results for orders placed or performed in visit on 21   CBC with platelets and differential     Status: Abnormal   Result Value Ref Range    WBC 8.9 4.0 - 11.0 10e9/L    RBC Count 5.00 3.8 - 5.2 10e12/L    Hemoglobin 12.4 11.7 - 15.7 g/dL    Hematocrit 38.6 35.0 - 47.0 %    MCV 77 (L) 78 - 100 fl    MCH 24.8 (L) 26.5 - 33.0 pg    MCHC 32.1 31.5 - 36.5 g/dL    RDW 17.3 (H) 10.0 - 15.0 %    Platelet Count 329 150 - 450 10e9/L    Diff Method Automated Method     % Neutrophils 46.2 %    % Lymphocytes 44.4 %    % Monocytes 7.5 %    % Eosinophils 1.6 %    % Basophils 0.3 %    Absolute Neutrophil 4.1 1.6 - 8.3 10e9/L    Absolute Lymphocytes 4.0 0.8 - 5.3 10e9/L    Absolute Monocytes 0.7 0.0 - 1.3 10e9/L    Absolute Eosinophils 0.1 0.0 - 0.7 10e9/L    Absolute Basophils 0.0 0.0 - 0.2 10e9/L      Preparing for Your Surgery  Getting started  A nurse will call you to review your health history and instructions. They will give you an arrival time based on your scheduled surgery time.  Please be ready to share the following:    Your doctor's clinic name and phone number    Your medical, surgical and anesthesia history    A list of allergies and sensitivities    A list of medicines, including herbal treatments and over-the-counter drugs    Whether the patient has a legal guardian (ask how to send us the papers in advance)  If you have a child who's having surgery, please ask for a copy of Preparing for Your Child's Surgery.    Preparing for surgery    Within 30 days of surgery: Have a pre-op exam (sometimes called an H&P, or History and Physical). This can be done at a clinic or pre-operative center.  ? If you're having a , you may not need this exam. Talk to your care team    At your pre-op exam, talk to your care team about all medicines you take. If you need to stop any medicines before surgery, ask when to start taking them again.  ? We do this for your safety. Many medicines can make you bleed too much during  surgery. Some change how well surgery (anesthesia) drugs work.    Call your insurance company to let them know you're having surgery. (If you don't have insurance, call [phone].)    Call your clinic if there's any change in your health. This includes signs of a cold or flu (sore throat, runny nose, cough, rash, fever). It also includes a scrape or scratch near the surgery site.    If you have questions on the day of surgery, call your hospital or surgery center.  Eating and drinking guidelines  For your safety: Unless your surgeon tells you otherwise, follow the guidelines below.    Eat and drink as usual until 8 hours before surgery. After that, no food or milk.    Drink clear liquids until 2 hours before surgery. These are liquids you can see through, like water, Gatorade and Propel Water. You may also have black coffee and tea (no cream or milk).    Nothing by mouth within 2 hours of surgery. This includes gum, candy and breath mints.    If you drink, stop drinking alcohol the night before surgery.    If your care team tells you to take medicine on the morning of surgery, it's okay to take it with a sip of water.  Preventing infection    Shower or bathe the night before and morning of your surgery. Follow the instructions your clinic gave you. (If no instructions, use regular soap.)    Don't shave or clip hair near your surgery site. We'll remove the hair if needed.    Don't smoke or vape the morning of surgery. You may chew nicotine gum up to 2 hours before surgery. A nicotine patch is okay.  ? Note: Some surgeries require you to completely quit smoking and nicotine. Check with your surgeon.    Your care team will make every effort to keep you safe from infection. We will:  ? Clean our hands often with soap and water (or an alcohol-based hand rub).  ? Clean the skin at your surgery site with a special soap that kills germs.  ? Give you a special gown to keep you warm. (Cold raises the risk of  infection.)  ? Wear special hair covers, masks, gowns and gloves during surgery.  ? Give antibiotic medicine, if prescribed. Not all surgeries need antibiotics.  What to bring on the day of surgery    Photo ID and insurance card    Copy of your health care directive, if you have one    Glasses and hearing aides (bring cases)  ? You can't wear contacts during surgery    Inhaler and eye drops, if you use them (tell us about these when you arrive)    CPAP machine or breathing device, if you use them    A few personal items, if spending the night    If you have . . .  ? A pacemaker or ICD (cardiac defibrillator): Bring the ID card.  ? An implanted stimulator: Bring the remote control.  ? A legal guardian: Bring a copy of the certified (court-stamped) guardianship papers.  Please remove any jewelry, including body piercings. Leave jewelry and other valuables at home.  If you're going home the day of surgery  Important: If you don't follow the rules below, we must cancel your surgery.     Arrange for someone to drive you home after surgery. You may not drive, take a taxi or take public transportation by yourself (unless you'll have local anesthesia only).    Arrange for a responsible adult to stay with you overnight. If you don't, we may keep you in the hospital overnight, and you may need to pay the costs yourself.  Questions?   If you have any questions for your care team, list them here: _________________________________________________________________________________________________________________________________________________________________________________________________________________________________________________________________________________________________________________________  For informational purposes only. Not to replace the advice of your health care provider. Copyright   2003, 2019 [facility]. All rights reserved. Clinically reviewed by Shannan Sifuentes MD. [employer] 432531 - REV  4/20.

## 2021-07-10 NOTE — OP NOTE
Hysteroscopy with Morcellation    Date of Surgery: 21     Surgeon: Marybel Monroe MD  Assistants:  Sindy Madden MD PGY-4  Miguel Wilkerson MD PGY-1  Giulia Millan MS3    Pre-operative Diagnoses: Failed embryo transfers, endometrial polyp  Post-operative Diagnoses: Same as above, s/p procedure below    Procedure: Hysteroscopy with morcellation, polypectomy    Anesthesia: MAC, paracervical block    EBL: 5cc  IVF: 750cc  UOP: not measured  Fluid deficit:  95 mL    Complications: None  Findings: EUA revealed 10 week sized anteverted mobile uterus, no adnexal masses palpated. Endometrial polyp noted inside the uterus.   Specimens: Endometrial polyp    Indications:  Ana Lilia Arrington is a 36 year old  with failed embryo transfers with endometrial polyp identified by SIS.  Risks, benefits, and alternatives to the procedure were discussed with the patient who elected to proceed.  All questions were answered and an informed consent was obtained.    Procedure:  The patient was taken to the operating room where she underwent MAC anesthesia without difficulty.  She was placed in a dorsal lithotomy position using yellow fin stirrups.  The patient was examined for the above noted findings and then prepped and draped in the usual sterile fashion.  A medium graves speculum was inserted into the vagina.  A tenaculum was placed on the anterior cervical lip. A paracervical block was performed using 20mL of 1% lidocaine without epi. The endocervical canal was serially dilated to 6 mm using Hegar dilators.  The hysteroscope was inserted without difficulty with the above findings noted.  Visualization was achieved using normal saline as a distending medium. The Myosure device was inserted and used to morcellate the endometrial polyp. The hysteroscope and Mysoure device were removed. The tissue was sent to pathology.  All instruments were then removed.  The tenaculum was removed from the cervix and the puncture sites were  hemostatic.  The patient was repositioned to the supine position.  The patient tolerated the procedure well and was taken to the recovery room in stable condition.  Dr. Monroe was scrubbed and present for the entire procedure.      Miguel Wilkerson MD  Ob/Gyn Resident, PGY-1  07/13/21 12:46 PM

## 2021-07-12 ENCOUNTER — LAB (OUTPATIENT)
Dept: LAB | Facility: CLINIC | Age: 36
End: 2021-07-12
Payer: COMMERCIAL

## 2021-07-12 DIAGNOSIS — Z01.818 PRE-OP EXAM: ICD-10-CM

## 2021-07-12 DIAGNOSIS — N84.0 ENDOMETRIAL POLYP: ICD-10-CM

## 2021-07-12 DIAGNOSIS — Z11.59 ENCOUNTER FOR SCREENING FOR OTHER VIRAL DISEASES: ICD-10-CM

## 2021-07-12 DIAGNOSIS — Z01.818 PRE-OP EXAM: Primary | ICD-10-CM

## 2021-07-12 LAB
ERYTHROCYTE [DISTWIDTH] IN BLOOD BY AUTOMATED COUNT: 18.7 % (ref 10–15)
HCT VFR BLD AUTO: 37.5 % (ref 35–47)
HGB BLD-MCNC: 11.9 G/DL (ref 11.7–15.7)
MCH RBC QN AUTO: 25.1 PG (ref 26.5–33)
MCHC RBC AUTO-ENTMCNC: 31.7 G/DL (ref 31.5–36.5)
MCV RBC AUTO: 79 FL (ref 78–100)
PLATELET # BLD AUTO: 301 10E3/UL (ref 150–450)
RBC # BLD AUTO: 4.75 10E6/UL (ref 3.8–5.2)
SARS-COV-2 RNA RESP QL NAA+PROBE: NEGATIVE
WBC # BLD AUTO: 6.6 10E3/UL (ref 4–11)

## 2021-07-12 PROCEDURE — 36415 COLL VENOUS BLD VENIPUNCTURE: CPT

## 2021-07-12 PROCEDURE — U0005 INFEC AGEN DETEC AMPLI PROBE: HCPCS

## 2021-07-12 PROCEDURE — U0003 INFECTIOUS AGENT DETECTION BY NUCLEIC ACID (DNA OR RNA); SEVERE ACUTE RESPIRATORY SYNDROME CORONAVIRUS 2 (SARS-COV-2) (CORONAVIRUS DISEASE [COVID-19]), AMPLIFIED PROBE TECHNIQUE, MAKING USE OF HIGH THROUGHPUT TECHNOLOGIES AS DESCRIBED BY CMS-2020-01-R: HCPCS

## 2021-07-12 PROCEDURE — 85027 COMPLETE CBC AUTOMATED: CPT

## 2021-07-13 ENCOUNTER — ANESTHESIA EVENT (OUTPATIENT)
Dept: SURGERY | Facility: CLINIC | Age: 36
End: 2021-07-13
Payer: COMMERCIAL

## 2021-07-13 ENCOUNTER — ANESTHESIA (OUTPATIENT)
Dept: SURGERY | Facility: CLINIC | Age: 36
End: 2021-07-13
Payer: COMMERCIAL

## 2021-07-13 ENCOUNTER — HOSPITAL ENCOUNTER (OUTPATIENT)
Facility: CLINIC | Age: 36
Discharge: HOME | End: 2021-07-13
Attending: OBSTETRICS & GYNECOLOGY | Admitting: OBSTETRICS & GYNECOLOGY
Payer: COMMERCIAL

## 2021-07-13 VITALS
SYSTOLIC BLOOD PRESSURE: 103 MMHG | WEIGHT: 194.45 LBS | OXYGEN SATURATION: 96 % | TEMPERATURE: 97.5 F | RESPIRATION RATE: 14 BRPM | BODY MASS INDEX: 31.25 KG/M2 | HEIGHT: 66 IN | HEART RATE: 57 BPM | DIASTOLIC BLOOD PRESSURE: 80 MMHG

## 2021-07-13 DIAGNOSIS — N84.0 ENDOMETRIAL POLYP: ICD-10-CM

## 2021-07-13 LAB
ABO + RH BLD: NORMAL
ABO + RH BLD: NORMAL
BLD GP AB SCN SERPL QL: NORMAL
BLOOD BANK CMNT PATIENT-IMP: NORMAL
GLUCOSE BLDC GLUCOMTR-MCNC: 104 MG/DL (ref 70–99)
HCG UR QL: NEGATIVE
HOLD SPECIMEN: NORMAL
SPECIMEN EXP DATE BLD: NORMAL

## 2021-07-13 PROCEDURE — 250N000011 HC RX IP 250 OP 636: Performed by: NURSE ANESTHETIST, CERTIFIED REGISTERED

## 2021-07-13 PROCEDURE — 86900 BLOOD TYPING SEROLOGIC ABO: CPT | Performed by: OBSTETRICS & GYNECOLOGY

## 2021-07-13 PROCEDURE — 86850 RBC ANTIBODY SCREEN: CPT | Performed by: OBSTETRICS & GYNECOLOGY

## 2021-07-13 PROCEDURE — 360N000076 HC SURGERY LEVEL 3, PER MIN: Performed by: OBSTETRICS & GYNECOLOGY

## 2021-07-13 PROCEDURE — 250N000009 HC RX 250: Performed by: NURSE ANESTHETIST, CERTIFIED REGISTERED

## 2021-07-13 PROCEDURE — 58558 HYSTEROSCOPY BIOPSY: CPT | Mod: GC | Performed by: OBSTETRICS & GYNECOLOGY

## 2021-07-13 PROCEDURE — 81025 URINE PREGNANCY TEST: CPT | Performed by: OBSTETRICS & GYNECOLOGY

## 2021-07-13 PROCEDURE — 710N000012 HC RECOVERY PHASE 2, PER MINUTE: Performed by: OBSTETRICS & GYNECOLOGY

## 2021-07-13 PROCEDURE — 86901 BLOOD TYPING SEROLOGIC RH(D): CPT | Performed by: OBSTETRICS & GYNECOLOGY

## 2021-07-13 PROCEDURE — 999N000141 HC STATISTIC PRE-PROCEDURE NURSING ASSESSMENT: Performed by: OBSTETRICS & GYNECOLOGY

## 2021-07-13 PROCEDURE — 370N000017 HC ANESTHESIA TECHNICAL FEE, PER MIN: Performed by: OBSTETRICS & GYNECOLOGY

## 2021-07-13 PROCEDURE — 88305 TISSUE EXAM BY PATHOLOGIST: CPT | Mod: 26 | Performed by: PATHOLOGY

## 2021-07-13 PROCEDURE — 250N000009 HC RX 250: Performed by: OBSTETRICS & GYNECOLOGY

## 2021-07-13 PROCEDURE — 250N000013 HC RX MED GY IP 250 OP 250 PS 637: Performed by: OBSTETRICS & GYNECOLOGY

## 2021-07-13 PROCEDURE — 88305 TISSUE EXAM BY PATHOLOGIST: CPT | Mod: TC | Performed by: OBSTETRICS & GYNECOLOGY

## 2021-07-13 PROCEDURE — 272N000001 HC OR GENERAL SUPPLY STERILE: Performed by: OBSTETRICS & GYNECOLOGY

## 2021-07-13 PROCEDURE — 258N000003 HC RX IP 258 OP 636: Performed by: NURSE ANESTHETIST, CERTIFIED REGISTERED

## 2021-07-13 RX ORDER — SODIUM CHLORIDE, SODIUM LACTATE, POTASSIUM CHLORIDE, CALCIUM CHLORIDE 600; 310; 30; 20 MG/100ML; MG/100ML; MG/100ML; MG/100ML
INJECTION, SOLUTION INTRAVENOUS CONTINUOUS PRN
Status: DISCONTINUED | OUTPATIENT
Start: 2021-07-13 | End: 2021-07-13

## 2021-07-13 RX ORDER — PROPOFOL 10 MG/ML
INJECTION, EMULSION INTRAVENOUS PRN
Status: DISCONTINUED | OUTPATIENT
Start: 2021-07-13 | End: 2021-07-13

## 2021-07-13 RX ORDER — ONDANSETRON 2 MG/ML
INJECTION INTRAMUSCULAR; INTRAVENOUS PRN
Status: DISCONTINUED | OUTPATIENT
Start: 2021-07-13 | End: 2021-07-13

## 2021-07-13 RX ORDER — NALOXONE HYDROCHLORIDE 0.4 MG/ML
0.2 INJECTION, SOLUTION INTRAMUSCULAR; INTRAVENOUS; SUBCUTANEOUS
Status: DISCONTINUED | OUTPATIENT
Start: 2021-07-13 | End: 2021-07-13 | Stop reason: HOSPADM

## 2021-07-13 RX ORDER — IBUPROFEN 200 MG
800 TABLET ORAL ONCE
Status: DISCONTINUED | OUTPATIENT
Start: 2021-07-13 | End: 2021-07-13 | Stop reason: HOSPADM

## 2021-07-13 RX ORDER — FENTANYL CITRATE 50 UG/ML
50 INJECTION, SOLUTION INTRAMUSCULAR; INTRAVENOUS EVERY 5 MIN PRN
Status: DISCONTINUED | OUTPATIENT
Start: 2021-07-13 | End: 2021-07-13 | Stop reason: HOSPADM

## 2021-07-13 RX ORDER — ACETAMINOPHEN 325 MG/1
975 TABLET ORAL ONCE
Status: DISCONTINUED | OUTPATIENT
Start: 2021-07-13 | End: 2021-07-13 | Stop reason: HOSPADM

## 2021-07-13 RX ORDER — NALOXONE HYDROCHLORIDE 0.4 MG/ML
0.4 INJECTION, SOLUTION INTRAMUSCULAR; INTRAVENOUS; SUBCUTANEOUS
Status: DISCONTINUED | OUTPATIENT
Start: 2021-07-13 | End: 2021-07-13 | Stop reason: HOSPADM

## 2021-07-13 RX ORDER — ONDANSETRON 2 MG/ML
4 INJECTION INTRAMUSCULAR; INTRAVENOUS EVERY 30 MIN PRN
Status: DISCONTINUED | OUTPATIENT
Start: 2021-07-13 | End: 2021-07-13 | Stop reason: HOSPADM

## 2021-07-13 RX ORDER — MEPERIDINE HYDROCHLORIDE 25 MG/ML
12.5 INJECTION INTRAMUSCULAR; INTRAVENOUS; SUBCUTANEOUS
Status: DISCONTINUED | OUTPATIENT
Start: 2021-07-13 | End: 2021-07-13 | Stop reason: HOSPADM

## 2021-07-13 RX ORDER — DEXAMETHASONE SODIUM PHOSPHATE 4 MG/ML
INJECTION, SOLUTION INTRA-ARTICULAR; INTRALESIONAL; INTRAMUSCULAR; INTRAVENOUS; SOFT TISSUE PRN
Status: DISCONTINUED | OUTPATIENT
Start: 2021-07-13 | End: 2021-07-13

## 2021-07-13 RX ORDER — PROPOFOL 10 MG/ML
INJECTION, EMULSION INTRAVENOUS CONTINUOUS PRN
Status: DISCONTINUED | OUTPATIENT
Start: 2021-07-13 | End: 2021-07-13

## 2021-07-13 RX ORDER — LIDOCAINE HYDROCHLORIDE 10 MG/ML
INJECTION, SOLUTION EPIDURAL; INFILTRATION; INTRACAUDAL; PERINEURAL PRN
Status: DISCONTINUED | OUTPATIENT
Start: 2021-07-13 | End: 2021-07-13 | Stop reason: HOSPADM

## 2021-07-13 RX ORDER — KETOROLAC TROMETHAMINE 30 MG/ML
INJECTION, SOLUTION INTRAMUSCULAR; INTRAVENOUS PRN
Status: DISCONTINUED | OUTPATIENT
Start: 2021-07-13 | End: 2021-07-13

## 2021-07-13 RX ORDER — SODIUM CHLORIDE, SODIUM LACTATE, POTASSIUM CHLORIDE, CALCIUM CHLORIDE 600; 310; 30; 20 MG/100ML; MG/100ML; MG/100ML; MG/100ML
INJECTION, SOLUTION INTRAVENOUS CONTINUOUS
Status: DISCONTINUED | OUTPATIENT
Start: 2021-07-13 | End: 2021-07-13 | Stop reason: HOSPADM

## 2021-07-13 RX ORDER — FENTANYL CITRATE 50 UG/ML
INJECTION, SOLUTION INTRAMUSCULAR; INTRAVENOUS PRN
Status: DISCONTINUED | OUTPATIENT
Start: 2021-07-13 | End: 2021-07-13

## 2021-07-13 RX ORDER — ACETAMINOPHEN 325 MG/1
975 TABLET ORAL ONCE
Status: COMPLETED | OUTPATIENT
Start: 2021-07-13 | End: 2021-07-13

## 2021-07-13 RX ORDER — LIDOCAINE HYDROCHLORIDE 20 MG/ML
INJECTION, SOLUTION INFILTRATION; PERINEURAL PRN
Status: DISCONTINUED | OUTPATIENT
Start: 2021-07-13 | End: 2021-07-13

## 2021-07-13 RX ORDER — ONDANSETRON 4 MG/1
4 TABLET, ORALLY DISINTEGRATING ORAL EVERY 30 MIN PRN
Status: DISCONTINUED | OUTPATIENT
Start: 2021-07-13 | End: 2021-07-13 | Stop reason: HOSPADM

## 2021-07-13 RX ADMIN — MIDAZOLAM 2 MG: 1 INJECTION INTRAMUSCULAR; INTRAVENOUS at 12:01

## 2021-07-13 RX ADMIN — PHENYLEPHRINE HYDROCHLORIDE 100 MCG: 10 INJECTION INTRAVENOUS at 12:36

## 2021-07-13 RX ADMIN — SODIUM CHLORIDE, POTASSIUM CHLORIDE, SODIUM LACTATE AND CALCIUM CHLORIDE: 600; 310; 30; 20 INJECTION, SOLUTION INTRAVENOUS at 12:01

## 2021-07-13 RX ADMIN — FENTANYL CITRATE 75 MCG: 50 INJECTION, SOLUTION INTRAMUSCULAR; INTRAVENOUS at 12:16

## 2021-07-13 RX ADMIN — LIDOCAINE HYDROCHLORIDE 50 MG: 20 INJECTION, SOLUTION INFILTRATION; PERINEURAL at 12:06

## 2021-07-13 RX ADMIN — KETOROLAC TROMETHAMINE 30 MG: 30 INJECTION, SOLUTION INTRAMUSCULAR at 12:41

## 2021-07-13 RX ADMIN — ONDANSETRON 4 MG: 2 INJECTION INTRAMUSCULAR; INTRAVENOUS at 12:06

## 2021-07-13 RX ADMIN — PROPOFOL 100 MCG/KG/MIN: 10 INJECTION, EMULSION INTRAVENOUS at 12:06

## 2021-07-13 RX ADMIN — DEXAMETHASONE SODIUM PHOSPHATE 8 MG: 4 INJECTION, SOLUTION INTRAMUSCULAR; INTRAVENOUS at 12:13

## 2021-07-13 RX ADMIN — FENTANYL CITRATE 25 MCG: 50 INJECTION, SOLUTION INTRAMUSCULAR; INTRAVENOUS at 12:12

## 2021-07-13 RX ADMIN — ACETAMINOPHEN 975 MG: 325 TABLET, FILM COATED ORAL at 07:04

## 2021-07-13 RX ADMIN — PROPOFOL 100 MG: 10 INJECTION, EMULSION INTRAVENOUS at 12:06

## 2021-07-13 ASSESSMENT — MIFFLIN-ST. JEOR: SCORE: 1593.75

## 2021-07-13 NOTE — ANESTHESIA POSTPROCEDURE EVALUATION
Patient: Ana Lilia Arrington    Procedure(s):  Hysteroscopy with morcellation    Diagnosis:Endometrial polyp [N84.0]  Diagnosis Additional Information: No value filed.    Anesthesia Type:  MAC    Note:  Disposition: Outpatient   Postop Pain Control: Uneventful            Sign Out: Well controlled pain   PONV: No   Neuro/Psych: Uneventful            Sign Out: Acceptable/Baseline neuro status   Airway/Respiratory: Uneventful            Sign Out: Acceptable/Baseline resp. status   CV/Hemodynamics: Uneventful            Sign Out: Acceptable CV status; No obvious hypovolemia; No obvious fluid overload   Other NRE:    DID A NON-ROUTINE EVENT OCCUR?            Last vitals:  Vitals:    07/13/21 1300 07/13/21 1315 07/13/21 1330   BP: 106/78 112/74 103/80   Pulse: 83 51 57   Resp:   14   Temp:   36.4  C (97.5  F)   SpO2: 96% 97% 96%       Last vitals prior to Anesthesia Care Transfer:  CRNA VITALS  7/13/2021 1224 - 7/13/2021 1324      7/13/2021             Resp Rate (observed):  (!) 0          Electronically Signed By: Harley Matthews MD  July 13, 2021  1:56 PM

## 2021-07-13 NOTE — ANESTHESIA CARE TRANSFER NOTE
Patient: Ana Lilia Arrington    Procedure(s):  Hysteroscopy with morcellation    Diagnosis: Endometrial polyp [N84.0]  Diagnosis Additional Information: No value filed.    Anesthesia Type:   MAC     Note:    Oropharynx: oropharynx clear of all foreign objects and spontaneously breathing  Level of Consciousness: awake  Oxygen Supplementation: room air    Independent Airway: airway patency satisfactory and stable  Dentition: dentition unchanged  Vital Signs Stable: post-procedure vital signs reviewed and stable  Report to RN Given: handoff report given  Patient transferred to: Phase II    Handoff Report: Identifed the Patient, Identified the Reponsible Provider, Reviewed the pertinent medical history, Discussed the surgical course, Reviewed Intra-OP anesthesia mangement and issues during anesthesia, Set expectations for post-procedure period and Allowed opportunity for questions and acknowledgement of understanding      Vitals: (Last set prior to Anesthesia Care Transfer)  CRNA VITALS  7/13/2021 1224 - 7/13/2021 1324      7/13/2021             Resp Rate (observed):  (!) 0        Electronically Signed By: SONU Fang CRNA  July 13, 2021  1:38 PM

## 2021-07-13 NOTE — DISCHARGE INSTRUCTIONS
Same-Day Surgery   Adult Discharge Orders & Instructions     For 24 hours after surgery:  1. Get plenty of rest.  A responsible adult must stay with you for at least 24 hours after you leave the hospital.   2. Pain medication can slow your reflexes. Do not drive or use heavy equipment.  If you have weakness or tingling, don't drive or use heavy equipment until this feeling goes away.  3. Mixing alcohol and pain medication can cause dizziness and slow your breathing. It can even be fatal. Do not drink alcohol while taking pain medication.  4. Avoid strenuous or risky activities.  Ask for help when climbing stairs.   5. You may feel lightheaded.  If so, sit for a few minutes before standing.  Have someone help you get up.   6. If you have nausea (feel sick to your stomach), drink only clear liquids such as apple juice, ginger ale, broth or 7-Up.  Rest may also help.  Be sure to drink enough fluids.  Move to a regular diet as you feel able. Take pain medications with a small amount of solid food, such as toast or crackers, to avoid nausea.   7. A slight fever is normal. Call the doctor if your fever is over 100 F (37.7 C) (taken under the tongue) or lasts longer than 24 hours.  8. You may have a dry mouth, muscle aches, trouble sleeping or a sore throat.  These symptoms should go away after 24 hours.  9. Do not make important or legal decisions.   Pain Management:      1. Take pain medication (if prescribed) for pain as directed by your physician.        2. WARNING: If the pain medication you have been prescribed contains Tylenol  (acetaminophen), DO NOT take additional doses of Tylenol (acetaminophen).     Call your doctor for any of the followin.  Signs of infection (fever, growing tenderness at the surgery site, severe pain, a large amount of drainage or bleeding, foul-smelling drainage, redness, swelling).    2.  It has been over 8 to 10 hours since surgery and you are still not able to urinate (pee).    3.   Headache for over 24 hours.    4.  Numbness, tingling or weakness the day after surgery (if you had spinal anesthesia).  To contact a doctor, call Dr. Monroe at [phone] or:      [phone] and ask for the Resident On Call for:          OBGYN (answered 24 hours a day)      Emergency Department:  [facility]: [phone]  [facility]: [phone]               Rev. 10/2014

## 2021-07-13 NOTE — ANESTHESIA PREPROCEDURE EVALUATION
Anesthesia Pre-Procedure Evaluation    Patient: Ana Lilia Arrington   MRN: [id number] : 1985        Preoperative Diagnosis: Endometrial polyp [N84.0]   Procedure : Procedure(s):  Hysteroscopy with morcellation     Past Medical History:   Diagnosis Date     Hypertension     only during pregnancy     Migraine      Postpartum depression       Past Surgical History:   Procedure Laterality Date     NO HISTORY OF SURGERY        Allergies   Allergen Reactions     Eggs      No Known Drug Allergies       Social History     Tobacco Use     Smoking status: Never Smoker     Smokeless tobacco: Never Used   Substance Use Topics     Alcohol use: No     Comment: none with pregnancy      Wt Readings from Last 1 Encounters:   21 88.2 kg (194 lb 7.1 oz)        Anesthesia Evaluation            ROS/MED HX  ENT/Pulmonary:       Neurologic:     (+) migraines,     Cardiovascular:     (+) hypertension-----    METS/Exercise Tolerance:     Hematologic:       Musculoskeletal:       GI/Hepatic:       Renal/Genitourinary:       Endo:       Psychiatric/Substance Use:     (+) psychiatric history depression     Infectious Disease:       Malignancy:       Other:            Physical Exam    Airway        Mallampati: II   TM distance: > 3 FB   Neck ROM: full   Mouth opening: > 3 cm    Respiratory Devices and Support         Dental  no notable dental history         Cardiovascular   cardiovascular exam normal          Pulmonary   pulmonary exam normal                OUTSIDE LABS:  CBC:   Lab Results   Component Value Date    WBC 6.6 2021    WBC 8.9 2021    HGB 11.9 2021    HGB 12.4 2021    HCT 37.5 2021    HCT 38.6 2021     2021     2021     BMP:   Lab Results   Component Value Date     2018     2015    POTASSIUM 4.2 2018    POTASSIUM 3.7 2015    CHLORIDE 105 2018    CHLORIDE 104 2015    CO2 29 2018    CO2 25 2015    BUN 6 (L)  06/09/2018    BUN 15 06/01/2015    CR 0.66 06/09/2018    CR 0.64 06/08/2018     (H) 07/13/2021    GLC 83 06/09/2018     COAGS: No results found for: PTT, INR, FIBR  POC:   Lab Results   Component Value Date    HCG Negative 07/05/2021     HEPATIC:   Lab Results   Component Value Date    ALBUMIN 2.2 (L) 06/09/2018    PROTTOTAL 6.2 (L) 06/09/2018    ALT 14 06/09/2018    AST 19 06/09/2018    ALKPHOS 114 06/09/2018    BILITOTAL 0.3 06/09/2018     OTHER:   Lab Results   Component Value Date    KATIUSKA 7.2 (L) 06/09/2018    MAG 6.0 (H) 06/09/2018    TSH 1.20 06/01/2015       Anesthesia Plan    ASA Status:  2      Anesthesia Type: MAC.              Consents    Anesthesia Plan(s) and associated risks, benefits, and realistic alternatives discussed. Questions answered and patient/representative(s) expressed understanding.     - Discussed with:  Patient    Use of blood products discussed: Yes.     - Discussed with: Patient.     Postoperative Care    Pain management: Multi-modal analgesia.   PONV prophylaxis: Ondansetron (or other 5HT-3)     Comments:                Harley Matthews MD

## 2021-07-13 NOTE — LETTER
[facility]  [address]  [facility] [address]  Phone: [phone]    July 13, 2021        Ana Lilia Arrington  [address]  [facility] [address]          To whom it may concern:    RE: Ana Lilia Arrington    Patient may return to work 7/14/2021 with the following:  No working or lifting restrictions    Please contact me for questions or concerns.      Sincerely,        Dr. Marybel Monroe

## 2021-07-14 LAB
PATH REPORT.COMMENTS IMP SPEC: NORMAL
PATH REPORT.COMMENTS IMP SPEC: NORMAL
PATH REPORT.FINAL DX SPEC: NORMAL
PATH REPORT.GROSS SPEC: NORMAL
PATH REPORT.RELEVANT HX SPEC: NORMAL
PHOTO IMAGE: NORMAL

## 2021-07-22 NOTE — MR AVS SNAPSHOT
After Visit Summary   4/6/2018    Ana Lilia Arrington    MRN: [id number]           Patient Information     Date Of Birth          1985        Visit Information        Provider Department      4/6/2018 11:30 AM Marybel Monroe MD [facility]        Today's Diagnoses     Pregnant state, gestational carrier    -  1    Need for Tdap vaccination           Follow-ups after your visit        Your next 10 appointments already scheduled     Apr 20, 2018  1:00 PM CDT   ESTABLISHED PRENATAL with Valentina Stevens MD   [facility] ([facility])    [address] [facility]  [address]  [facility] [id number] [phone]            Apr 20, 2018  1:30 PM CDT   (Arrive by 1:15 PM)   MFM US COMPRE SINGLE F/U with [id number]   [facility])    [address]  [facility] [address] [phone]           Wear comfortable clothes and leave your valuables at home.            Apr 20, 2018  2:00 PM CDT   Radiology MD with UR MARGARET WALTON   [facility])    [address]  [facility] [id number] [phone]           Please arrive at the time given for your first appointment. This visit is used internally to schedule the physician's time during your ultrasound.              Who to contact     If you have questions or need follow up information about today's clinic visit or your schedule please contact [facility] directly at [phone].  Normal or non-critical lab and imaging results will be communicated to you by MyChart, letter or phone within 4 business days after the clinic has received the results. If you do not hear from us within 7 days, please contact the clinic through MyChart or phone. If you have a critical or abnormal lab result, we will notify you by phone as soon as  possible.  Submit refill requests through Snipd or call your pharmacy and they will forward the refill request to us. Please allow 3 business days for your refill to be completed.          Additional Information About Your Visit        Fibersparhart Information     Snipd gives you secure access to your electronic health record. If you see a primary care provider, you can also send messages to your care team and make appointments. If you have questions, please call your primary care clinic.  If you do not have a primary care provider, please call [phone] and they will assist you.        Care EveryWhere ID     This is your Care EveryWhere ID. This could be used by other organizations to access your [facility] medical records  [id number]        Your Vitals Were     Pulse Temperature BMI (Body Mass Index)             74 98.1  F (36.7  C) (Oral) 32.96 kg/m2          Blood Pressure from Last 3 Encounters:   04/06/18 111/74   03/16/18 95/57   02/16/18 106/72    Weight from Last 3 Encounters:   04/06/18 192 lb (87.1 kg)   03/16/18 187 lb (84.8 kg)   02/16/18 186 lb 12.8 oz (84.7 kg)              We Performed the Following     TDAP VACCINE (ADACEL)     VACCINE ADMINISTRATION, INITIAL        Primary Care Provider Office Phone # Fax #    SONU Ortiz [facility] [phone]       [address] [facility] [address]  [facility] [address]        Equal Access to Services     [facility]: Hadii aad ku hadasho Soantonioali, waaxda luqadaha, qaybta kaalmada adeclaritayada, duane hewitt . So [facility] [phone].    ATENCIÓN: Si habla español, tiene a alberto disposición servicios gratuitos de asistencia lingüística. Llame al [phone].    We comply with applicable federal civil rights laws and [place] laws. We do not discriminate on the basis of race, color, national origin, age, disability, sex, sexual orientation, or gender identity.            Thank you!     Thank you for choosing [facility]  [place]  for your care. Our goal is always to provide you with excellent care. Hearing back from our patients is one way we can continue to improve our services. Please take a few minutes to complete the written survey that you may receive in the mail after your visit with us. Thank you!             Your Updated Medication List - Protect others around you: Learn how to safely use, store and throw away your medicines at www.disposemymeds.org.          This list is accurate as of 4/6/18  1:55 PM.  Always use your most recent med list.                   Brand Name Dispense Instructions for use Diagnosis    prenatal multivitamin plus iron 27-0.8 MG Tabs per tablet      Take 1 tablet by mouth daily           4

## 2021-07-26 ENCOUNTER — VIRTUAL VISIT (OUTPATIENT)
Dept: OBGYN | Facility: CLINIC | Age: 36
End: 2021-07-26
Payer: COMMERCIAL

## 2021-07-26 DIAGNOSIS — Z09 POSTOP CHECK: Primary | ICD-10-CM

## 2021-07-26 PROCEDURE — 99212 OFFICE O/P EST SF 10 MIN: CPT | Mod: TEL | Performed by: OBSTETRICS & GYNECOLOGY

## 2021-07-26 NOTE — PROGRESS NOTES
Ana Lilia is a 36 year old who is being evaluated via a billable telephone visit.      What phone number would you like to be contacted at? [phone]  How would you like to obtain your AVS? MyChart    1. Postop check  Recovered appropriately.  No f/u necessary from my perspective.  Will await next menstrual cycle, typically within 4-6w, and then return to care from fertility clinic.  Knows to call office for prenatal care when released by fertility clinic.    Marybel Monroe MD        Subjective   Ana Lilia is a 36 year old who presents for the following health issues:  Post-op check    HPI     Had normal bleeding the same day, but no bleeding the next day.  No pain.  No questions or concerns.  Has to follow-up with fertility clinic when she has her first cycle.  Has access to all records, op note, pathology, etc to send to them.    Review of Systems   Constitutional, HEENT, cardiovascular, pulmonary, gi and gu systems are negative, except as otherwise noted.      Objective           Vitals:  No vitals were obtained today due to virtual visit.    Physical Exam   healthy, alert and no distress  PSYCH: Alert and oriented times 3; coherent speech, normal   rate and volume, able to articulate logical thoughts, able   to abstract reason, no tangential thoughts, no hallucinations   or delusions  Her affect is normal  RESP: No cough, no audible wheezing, able to talk in full sentences  Remainder of exam unable to be completed due to telephone visits    Reviewed procedure and pathology during appointment.          Phone call duration: 7 minutes

## 2021-09-25 ENCOUNTER — HEALTH MAINTENANCE LETTER (OUTPATIENT)
Age: 36
End: 2021-09-25

## 2021-10-13 ENCOUNTER — ANCILLARY PROCEDURE (OUTPATIENT)
Dept: ULTRASOUND IMAGING | Facility: CLINIC | Age: 36
End: 2021-10-13
Attending: OBSTETRICS & GYNECOLOGY
Payer: COMMERCIAL

## 2021-10-13 DIAGNOSIS — O09.811 PREGNANCY RESULTING FROM IN VITRO FERTILIZATION IN FIRST TRIMESTER: ICD-10-CM

## 2021-10-13 PROCEDURE — 76817 TRANSVAGINAL US OBSTETRIC: CPT | Performed by: OBSTETRICS & GYNECOLOGY

## 2021-10-26 ENCOUNTER — ANCILLARY PROCEDURE (OUTPATIENT)
Dept: ULTRASOUND IMAGING | Facility: CLINIC | Age: 36
End: 2021-10-26
Attending: OBSTETRICS & GYNECOLOGY
Payer: COMMERCIAL

## 2021-10-26 DIAGNOSIS — Z33.3 PREGNANT STATE, GESTATIONAL CARRIER: ICD-10-CM

## 2021-10-26 DIAGNOSIS — Z34.90 EARLY STAGE OF PREGNANCY: ICD-10-CM

## 2021-10-26 PROCEDURE — 76801 OB US < 14 WKS SINGLE FETUS: CPT | Performed by: OBSTETRICS & GYNECOLOGY

## 2021-11-21 ENCOUNTER — NURSE TRIAGE (OUTPATIENT)
Dept: NURSING | Facility: CLINIC | Age: 36
End: 2021-11-21
Payer: COMMERCIAL

## 2021-11-21 DIAGNOSIS — O20.9 BLEEDING IN EARLY PREGNANCY: Primary | ICD-10-CM

## 2021-11-21 NOTE — TELEPHONE ENCOUNTER
Bloody discharge, 10 AM Friday, called kumar, back pain with vaginal discharge, not soaking through, bloody more today, notices on the underwear, clots once, less blood Friday, headache and nausea, back pain constant since Friday rated 4/10  Patient calling with bloody discharge since 11/19. Reports speaking with someone from the ObGYN office with input to monitor at home and call if symptoms worsen. Reports having ongoing bloody discharge with a small amount noted in the underwear and clots once today. Reports bleeding still remains mild- moderate. Patient also reporting back pain rated 4/10. Advised per protocol to send message to PCP for PCP input within 24 hours with patient agreeable to the plan. Patient to monitor symptoms at home and seek more urgent care if bleeding becomes worse or abdominal pain arises. Denies abdominal pain, losing consciousness and shoulder pain. Requesting OB input and call back to patient tomorrow 11/22.     Lovely Elmore RN 11/21/21 11:03 AM   [facility] Triage Nurse Advisor      Reason for Disposition    MODERATE vaginal bleeding (i.e., soaking 1 pad / hour; clots)    Additional Information    Negative: Shock suspected (e.g., cold/pale/clammy skin, too weak to stand, low BP, rapid pulse)    Negative: Difficult to awaken or acting confused (e.g., disoriented, slurred speech)    Negative: Passed out (i.e., lost consciousness, collapsed and was not responding)    Negative: Sounds like a life-threatening emergency to the triager    Negative: [1] Vaginal bleeding AND [2] pregnant > 20 weeks    Negative: Not pregnant or pregnancy status unknown    Negative: SEVERE abdominal pain    Negative: [1] SEVERE vaginal bleeding (e.g., soaking 2 pads / hour, large blood clots) AND [2] present 2 or more hours    Negative: SEVERE dizziness (e.g., unable to stand, requires support to walk, feels like passing out)    Negative: [1] MODERATE vaginal bleeding (i.e., soaking 1 pad / hour; clots) AND [2]  "present > 6 hours    Negative: Passed tissue (e.g., gray-white)    Negative: [1] MODERATE vaginal bleeding (i.e., soaking 1 pad / hour; clots) AND [2] pregnant > 12 weeks    Negative: Shoulder pain    Negative: Pale skin (pallor) of new onset or worsening    Negative: Patient sounds very sick or weak to the triager    Negative: [1] Constant abdominal pain AND [2] present > 2 hours    Negative: Fever > 100.4 F (38.0 C)    Negative: [1] Intermittent lower abdominal pain (e.g., cramping) AND [2] present > 24 hours    Negative: Prior history of \"ectopic pregnancy\" or previous tubal surgery (e.g., tubal ligation)    Negative: Pain or burning with passing urine (urination)    Protocols used: PREGNANCY - VAGINAL BLEEDING LESS THAN 20 WEEKS EGA-A-      "

## 2021-11-22 ENCOUNTER — TELEPHONE (OUTPATIENT)
Dept: OBGYN | Facility: CLINIC | Age: 36
End: 2021-11-22
Payer: COMMERCIAL

## 2021-11-22 NOTE — TELEPHONE ENCOUNTER
"Called patient per request of triage, patient reporting \"bleeding\" this weekend. States today she saw spotting, but denies heavy bleeding, saturating a pad, passing clots, etc. Discussed that spotting can be normal during pregnancy. Reports feeling mild pain in her hips, a mild headache, and nausea. Discussed options for management of symptoms including Tylenol, B6, and unisom. Patient reports that her fertility provider \"recommended an ultrasound for this week\" but does not have more information about why. Given there isn't a clear reason for the ultrasound would you recommend ordering this? Has her new prenatal phone appointment on 11/24 and first in-person on 11/30 with Mabel. Order cued up should you decide to order a viability US. Had a viability US on 10/26.   Elizabeth Wise RN   "

## 2021-11-22 NOTE — TELEPHONE ENCOUNTER
Agree with viability US.  If reassuring this time, could cancel upcoming US.    Marybel Monroe MD

## 2021-11-24 ENCOUNTER — TRANSCRIBE ORDERS (OUTPATIENT)
Dept: MATERNAL FETAL MEDICINE | Facility: CLINIC | Age: 36
End: 2021-11-24

## 2021-11-24 ENCOUNTER — PRENATAL OFFICE VISIT (OUTPATIENT)
Dept: NURSING | Facility: CLINIC | Age: 36
End: 2021-11-24
Payer: COMMERCIAL

## 2021-11-24 VITALS — BODY MASS INDEX: 31.38 KG/M2 | HEIGHT: 66 IN

## 2021-11-24 DIAGNOSIS — O26.90 PREGNANCY RELATED CONDITION, ANTEPARTUM: Primary | ICD-10-CM

## 2021-11-24 DIAGNOSIS — Z23 NEED FOR TDAP VACCINATION: ICD-10-CM

## 2021-11-24 DIAGNOSIS — O09.529 ENCOUNTER FOR SUPERVISION OF MULTIGRAVIDA WITH ADVANCED MATERNAL AGE: Primary | ICD-10-CM

## 2021-11-24 PROCEDURE — 99207 PR NO CHARGE NURSE ONLY: CPT

## 2021-11-24 RX ORDER — ASPIRIN 81 MG/1
81 TABLET, CHEWABLE ORAL DAILY
Status: ON HOLD | COMMUNITY
End: 2022-06-03

## 2021-11-24 RX ORDER — PROGESTERONE 50 MG/ML
INJECTION, SOLUTION INTRAMUSCULAR
COMMUNITY
Start: 2021-10-22 | End: 2021-11-24

## 2021-11-24 RX ORDER — ESTRADIOL 2 MG/1
TABLET ORAL
COMMUNITY
Start: 2021-10-22 | End: 2021-11-24

## 2021-11-24 NOTE — PROGRESS NOTES
Important Information for Provider:     New ob nurse intake by phone, fifth pregnancy, AMA. Handouts reviewed and given on 11/26/2021. Patient has unknown period, 2 ultrasounds performed. Repeat ultrasound scheduled for 11/26/2021 with Dr Wilkerson to call back with results. NOB with Dr Monroe 11/30/2021. Ordered genetic screening. Recommended  B6, Unisom for nausea.  History of hypertension previous pregnancy end of third trimester.     Caffeine intake/servings daily - 0  Calcium intake/servings daily - 3  Exercise 5 times weekly - describe ; walks, , precautions given  Sunscreen used - Yes  Seatbelts used - Yes  Guns stored in the home - No  Self Breast Exam - Yes  Pap test up to date -  Yes  Eye exam up to date -  Yes  Dental exam up to date -  Yes  Immunizations reviewed and up to date - Yes  Abuse: Current or Past (Physical, Sexual or Emotional) - No  Do you feel safe in your environment - Yes  Do you cope well with stress - Yes  Do you suffer from insomnia - No      Prenatal OB Questionnaire  Patient supplied answers from flow sheet for:  Prenatal OB Questionnaire.  Past Medical History  Have you ever recieved care for your mental health? : No  Have you ever been in a major accident or suffered serious trauma?: No  Within the last year, has anyone hit, slapped, kicked or otherwise hurt you?: No  In the last year, has anyone forced you to have sex when you didn't want to?: No    Past Medical History 2   Have you ever received a blood transfusion?: No  Would you accept a blood transfusion if was medically recommended?: Yes  Does anyone in your home smoke?: No   Is your blood type Rh negative?: No  Have you ever ?: (!) Yes  Have you been hospitalized for a nonsurgical reason excluding normal delivery?: No  Have you ever had an abnormal pap smear?: No    Past Medical History (Continued)  Do you have a history of abnormalities of the uterus?: No  Did your mother take SAHIL or any other hormones when she  was pregnant with you?: No  Do you have any other problems we have not asked about which you feel may be important to this pregnancy?: No                     Allergies as of 11/24/2021:    Allergies as of 11/24/2021 - Reviewed 11/24/2021   Allergen Reaction Noted     Eggs  10/29/2010     No known drug allergies  08/06/2010       Current medications are:  Current Outpatient Medications   Medication Sig Dispense Refill     acetaminophen (TYLENOL) 325 MG tablet Take 2 tablets (650 mg) by mouth every 4 hours as needed for mild pain or fever (greater than or equal to 38  C /100.4  F (oral) or 38.5  C/ 101.4  F (core).) 40 tablet 0     aspirin (ASA) 81 MG chewable tablet Take 81 mg by mouth daily       cholecalciferol 125 MCG (5000 UT) CAPS        Prenatal Vit-Fe Fumarate-FA (PRENATAL VITAMIN PO)            Early ultrasound screening tool:    Does patient have irregular periods?  No  Did patient use hormonal birth control in the three months prior to positive urine pregnancy test? No  Is the patient breastfeeding?  No  Is the patient 10 weeks or greater at time of education visit?  Yes

## 2021-11-26 ENCOUNTER — ANCILLARY PROCEDURE (OUTPATIENT)
Dept: ULTRASOUND IMAGING | Facility: CLINIC | Age: 36
End: 2021-11-26
Attending: OBSTETRICS & GYNECOLOGY
Payer: COMMERCIAL

## 2021-11-26 ENCOUNTER — PRE VISIT (OUTPATIENT)
Dept: MATERNAL FETAL MEDICINE | Facility: CLINIC | Age: 36
End: 2021-11-26

## 2021-11-26 ENCOUNTER — OFFICE VISIT (OUTPATIENT)
Dept: OBGYN | Facility: CLINIC | Age: 36
End: 2021-11-26
Payer: COMMERCIAL

## 2021-11-26 VITALS
WEIGHT: 201 LBS | OXYGEN SATURATION: 98 % | SYSTOLIC BLOOD PRESSURE: 128 MMHG | HEART RATE: 83 BPM | BODY MASS INDEX: 32.44 KG/M2 | DIASTOLIC BLOOD PRESSURE: 77 MMHG

## 2021-11-26 DIAGNOSIS — O20.9 BLEEDING IN EARLY PREGNANCY: ICD-10-CM

## 2021-11-26 DIAGNOSIS — O99.211 OBESITY AFFECTING PREGNANCY IN FIRST TRIMESTER: ICD-10-CM

## 2021-11-26 PROCEDURE — 76801 OB US < 14 WKS SINGLE FETUS: CPT | Performed by: OBSTETRICS & GYNECOLOGY

## 2021-11-26 PROCEDURE — 99213 OFFICE O/P EST LOW 20 MIN: CPT | Performed by: OBSTETRICS & GYNECOLOGY

## 2021-11-27 NOTE — PROGRESS NOTES
"  Assessment & Plan     Subchorionic hemorrhage of placenta in first trimester, single or unspecified fetus  Discussed ultrasound findings.   Rh positive.   Recommend light activity, no intercourse until 2 weeks after bleeding     Obesity affecting pregnancy in first trimester  Discussed. Likely contributing to back pain.   Recommend early 1 hr GTT with next visit, ordered to be done with new ob labs and discussed.   - Glucose tolerance, gest screen, 1 hour; Future    Review of the result(s) of each unique test - ultrasound  Ordering of each unique test         BMI:   Estimated body mass index is 32.44 kg/m  as calculated from the following:    Height as of 11/24/21: 1.676 m (5' 6\").    Weight as of this encounter: 91.2 kg (201 lb).   Weight management plan: Discussed healthy diet and exercise guidelines        No follow-ups on file.    Terrie Wilkerson MD  [facility] is a 36 year old who presents for the following health issues     HPI   Ultrasound review  Has been having first trimester bleeding. Happened 5-6 days ago, then quickly faded to dark spotting.   Otherwise more nausea this pregnancy. More pelvic and back/shoulder pain. Active job doing cleaning.   Starting this pregnancy about 40 pounds heavier than her last one. Is gestational carrier and gained a lot of weight with the hormones.     Past Medical History:   Diagnosis Date     Carrier of group B Streptococcus      Hypertension     only during pregnancy     Migraine      Postpartum depression      Varicella        Past Surgical History:   Procedure Laterality Date     NO HISTORY OF SURGERY       OPERATIVE HYSTEROSCOPY WITH MORCELLATOR N/A 7/13/2021    Procedure: Hysteroscopy with morcellation;  Surgeon: Marybel Monroe MD;  Location:  OR       Family History   Problem Relation Age of Onset     Family History Negative No family hx of        Social History     Socioeconomic History     Marital " status:      Spouse name: Sal     Number of children: 2     Years of education: Not on file     Highest education level: Not on file   Occupational History     Employer: [employer]   Tobacco Use     Smoking status: Never Smoker     Smokeless tobacco: Never Used   Vaping Use     Vaping Use: Never used   Substance and Sexual Activity     Alcohol use: No     Comment: none with pregnancy     Drug use: No     Sexual activity: Yes     Partners: Male   Other Topics Concern      Service Not Asked     Blood Transfusions No     Caffeine Concern Not Asked     Occupational Exposure Not Asked     Hobby Hazards Not Asked     Sleep Concern Not Asked     Stress Concern Not Asked     Weight Concern Not Asked     Special Diet Not Asked     Back Care Not Asked     Exercise Not Asked     Bike Helmet Not Asked     Seat Belt Not Asked     Self-Exams Not Asked     Parent/sibling w/ CABG, MI or angioplasty before 65F 55M? Not Asked   Social History Narrative    Living arrangements - the patient lives with her family.      Social Determinants of Health     Financial Resource Strain: Not on file   Food Insecurity: Not on file   Transportation Needs: Not on file   Physical Activity: Not on file   Stress: Not on file   Social Connections: Not on file   Intimate Partner Violence: Not on file   Housing Stability: Not on file       Current Outpatient Medications   Medication     acetaminophen (TYLENOL) 325 MG tablet     aspirin (ASA) 81 MG chewable tablet     cholecalciferol 125 MCG (5000 UT) CAPS     Prenatal Vit-Fe Fumarate-FA (PRENATAL VITAMIN PO)     No current facility-administered medications for this visit.          Allergies   Allergen Reactions     Eggs      States she doesn't have allergy to eggs, eats eggs     No Known Drug Allergies          Review of Systems   Constitutional, HEENT, cardiovascular, pulmonary, gi and gu systems are negative, except as otherwise noted.      Objective    /77   Pulse 83   Wt 91.2  kg (201 lb)   LMP 09/17/2021   SpO2 98%   Breastfeeding No   BMI 32.44 kg/m    Body mass index is 32.44 kg/m .  Physical Exam   GENERAL: healthy, alert and no distress  PSYCH: mentation appears normal, affect normal/bright    Results for orders placed or performed in visit on 11/26/21 (from the past 24 hour(s))   US OB < 14 Weeks Single    Narrative    Obstetrical Ultrasound Report  OB U/S  < 14 Weeks -  Transabdominal   [facility]  Referring Provider: Dr. Marybel Monroe  Sonographer: Kamryn Navarrete RDMS  Indication:  Viability check      Dating (mm/dd/yyyy):   LMP: unknown  EDC:  06/08/22  GA by IVF transfer, 6w US:  12w2d     Current Scan On:  11/26/2021        EDC:  06/08/22  GA by Current Scan:        12w2d  The calculation of the gestational age by current scan was based on CRL.    Anatomy Scan:  Osborn gestation.    Biometry:  CRL                       5.7 cm                  12w2d                    Yolk Sac               N/A                                                            Fetal heart activity:  Rate and rhythm is within normal limits.  Fetal   heart rate: 161 bpm  Findings: Viable IUP, small IONA x2, 1 = 1.4x 0.8x 0.9cm, 2 = 2.0x 0.5x   0.6cm     Maternal Structures:  Cervix: The cervix appears long and closed.  Right Ovary: Wnl  Left Ovary: Wnl    Impression:   Viable osborn intrauterine pregnancy with growth at 12w 2d (+/- 7-10   days) which is consistent with previously established dating, EDC   6/8/2022.  Two small subchorionic hemorrhages seen.    Marybel Pisano MD

## 2021-11-29 NOTE — PROGRESS NOTES
OB - New OB History and Physical    HPI: Ana Lilia Arrington is a 36 year old  at 13w2d as dated by embryo transfer, consistent with early ultrasound.   Estimated Date of Delivery: 2022.    Patient is gestational carrier; this is her second pregnancy as a gestational carrier.  Different couple than last time.  This couples in [place].  Mom is a psychologist and dad is  at [employer].  Mom was never able to get pregnant on her own.  Then I wrist had to embryo transfers using the intended mom's eggs, but both transfers failed.  This was thought to be due to medications she has to take for migraines.  As such severe migraines that she even wears sunglasses inside.  This pregnancy is the sperm of the  but an egg donor.  Ana Lilia and her  are uncertain of the age of the egg donor.  They have been on the journey with this couple since 2019.    Since becoming pregnant, patient reports she's been feeling nauseated.  Things are slowly improving and she tries to avoid medications whenever possible.  About 1-1/2 weeks ago, she did have an episode of bleeding in the pregnancy.  Woke up feeling well but saw blood in the toilet after she used the restroom.  Had a little bit of pink or brown when she wiped after that, but no further bleeding.  Did not have any cramping associated with this episode    Some muscle soreness of shoulders, neck, and rib cage, worse on the left side.  Likely related to her job as a .    Ultrasound: 21  Viable osborn intrauterine pregnancy with growth at 12w 2d (+/- 7-10 days) which is consistent with previously established dating, EDC 2022.  Two small subchorionic hemorrhages seen.    Obstetric history:     OB History    Para Term  AB Living   5 4 4 0 0 4   SAB IAB Ectopic Multiple Live Births   0 0 0 0 4      # Outcome Date GA Lbr Mayank/2nd Weight Sex Delivery Anes PTL Lv   5 Current            4 Term 18 39w2d 03:45 / 00:08 2.54 kg (5  lb 9.6 oz) F Vag-Spont EPI N MICHELINE      Birth Comments: gestational carrier for couple in [place]      Complications: GBS, Preeclampsia/Hypertension      Name: IVETH BE      Apgar1: 8  Apgar5: 9   3 Term 09/12/10 39w0d 06:00 3.799 kg (8 lb 6 oz) M    MICHELINE      Name: radha   2 Term 08 40w2d 03:00 3.175 kg (7 lb) F    MICHELINE      Name: Marcella   1 Term 06 40w0d 05:00 2.722 kg (6 lb) F    MICHELINE      Name: Estephanie     Pos history of pre-eclampsia with severe features.  Patient denies history of gestational diabetes,  labor.    Gynecologic History:   Patient's last menstrual period was 2021.   STI history: neg  Last Pap: 2018  History of abnormal pap: neg    Allergy: Eggs and No known drug allergies  Patient denies food, latex or environmental allergies.     Current Medications:  Current Outpatient Medications   Medication     acetaminophen (TYLENOL) 325 MG tablet     aspirin (ASA) 81 MG chewable tablet     cholecalciferol 125 MCG (5000 UT) CAPS     Prenatal Vit-Fe Fumarate-FA (PRENATAL VITAMIN PO)     No current facility-administered medications for this visit.       Past Medical History:  Past Medical History:   Diagnosis Date     Carrier of group B Streptococcus      Hypertension     only during pregnancy     Migraine      Postpartum depression      Varicella        Past Surgical History:  Past Surgical History:   Procedure Laterality Date     NO HISTORY OF SURGERY       OPERATIVE HYSTEROSCOPY WITH MORCELLATOR N/A 2021    Procedure: Hysteroscopy with morcellation;  Surgeon: Marybel Monroe MD;  Location:  OR       Social History:  Patient lives with  and kids.  Patient's relationship status is: .    Works as .   Denies current tobacco, alcohol or recreational drug use.   She feels safe in her relationship. Patient denies history of sexual, physical or mental abuse.     Family History:  Family History   Problem Relation Age of Onset     Family  History Negative No family hx of        Review of Systems  Gen:  no change in weight, no fever, no chills, no fatigue  CV: no palpitations, no chest pain, no hypertension, no syncope  Resp: no shortness of breath, no cough, no wheezing, no asthma  GI: + nausea, occ vomiting, no diarrhea, no constipation, no bloating, no GERD  :  no vaginal discharge, no dysuria, no abnormal bleeding, no pelvic pain   Endo: no thyroid problems, no cold/heat intolerance, no acne, no hirsutism, no diabetes  Heme: no easy bruising or bleeding, no history of DVT/PE/CVA  Neuro: no headaches, no seizures, no strokes, no focal deficits      Physical Exam:  Vitals:    21 1116   BP: 120/67   Pulse: 68   SpO2: 97%   Weight: 90.4 kg (199 lb 3.2 oz)     Body mass index is 32.15 kg/m .  Gen: alert, oriented, no distress,  pleasant, appears stated age, appropriately groomed  Neck: supple, trachea midline, no thyromegaly, no lymphadenopathy  HEENT: head normocephalic, atraumatic, normal oropharynx without erythema or exudates  CV: normal heart sounds, regular rate and rhythm, no murmurs  Resp: good inspiratory effort, lungs clear to ascultation bilaterally, no wheezes or rhonchi  Breast: symmetrical without masses, skin changes or nipple discharge.   Abd: soft, nontender, nondistended, normoactive bowel sounds,  no masses or organomegaly, no hernias, no scars  : normal external genitalia with lesions or erythema; normal, well supported urethra, normal Bartholins, normal Skenes;   Extr: warm, well perfused, nontender, no edema  Psych: affect bright, cooperative, responds appropriately      Assessment:  Ana Lilia Arrington is a 36 year old  at 12w4d presenting to establish prenatal care.    Problem List:   History of pre-eclampsia with severe features  IVF pregnancy--gestational carrier  AMA    Plan:  1. Pre-e with severe features: Plan baseline labs and baby ASA--already taking  2. IVF pregnancy--gestational carrier:  Plan [facility] referral,  Level II and fetal echo  3. AMA:  Plan level II US.  Discussed genetic screening, which is scheduled for tomorrow.  Recommended that they find out the age of the egg donor, if possible.  Ideally, would also have intended parents on the phone for release genetic counseling appointment.  They will discuss with intended parents.  4. Obesity:  Early 1 hour GTT  5. Reviewed routine prenatal care. Discussed MD call schedule as well as role of residents and med students both in clinic and hospital.  She is okay with resident care  6. Pap: UTD   7. Diet, Nutrition and Exercise:  Continue PNVs. Continue normal exercise. Her prepregnancy BMI is 31.  According to the WHO guidelines, patient is given a goal of gaining approximately 11-20 pounds during the course of her pregnancy.    8. Immunizations: plan TdaP at 28 weeks.  Will get flu s  9. Fetal anomaly screening: scheduled for tomorrow  10. Routine Prenatal Care: the patient will return to clinic in 4 weeks and prn    Marybel Monroe MD

## 2021-11-30 ENCOUNTER — PRENATAL OFFICE VISIT (OUTPATIENT)
Dept: OBGYN | Facility: CLINIC | Age: 36
End: 2021-11-30
Payer: COMMERCIAL

## 2021-11-30 VITALS
DIASTOLIC BLOOD PRESSURE: 67 MMHG | WEIGHT: 199.2 LBS | HEART RATE: 68 BPM | SYSTOLIC BLOOD PRESSURE: 120 MMHG | OXYGEN SATURATION: 97 % | BODY MASS INDEX: 32.15 KG/M2

## 2021-11-30 DIAGNOSIS — Z33.3 PREGNANT STATE, GESTATIONAL CARRIER: ICD-10-CM

## 2021-11-30 DIAGNOSIS — R73.09 ABNORMAL GLUCOSE TOLERANCE TEST: Primary | ICD-10-CM

## 2021-11-30 DIAGNOSIS — O09.529 ENCOUNTER FOR SUPERVISION OF MULTIGRAVIDA WITH ADVANCED MATERNAL AGE: ICD-10-CM

## 2021-11-30 DIAGNOSIS — E66.811 CLASS 1 OBESITY WITHOUT SERIOUS COMORBIDITY WITH BODY MASS INDEX (BMI) OF 32.0 TO 32.9 IN ADULT, UNSPECIFIED OBESITY TYPE: ICD-10-CM

## 2021-11-30 DIAGNOSIS — O09.299 HISTORY OF PRE-ECLAMPSIA IN PRIOR PREGNANCY, CURRENTLY PREGNANT: ICD-10-CM

## 2021-11-30 DIAGNOSIS — Z23 NEED FOR PROPHYLACTIC VACCINATION AND INOCULATION AGAINST INFLUENZA: ICD-10-CM

## 2021-11-30 DIAGNOSIS — O09.811 PREGNANCY RESULTING FROM IN VITRO FERTILIZATION IN FIRST TRIMESTER: ICD-10-CM

## 2021-11-30 DIAGNOSIS — O99.211 OBESITY AFFECTING PREGNANCY IN FIRST TRIMESTER: ICD-10-CM

## 2021-11-30 DIAGNOSIS — O09.521 MULTIGRAVIDA OF ADVANCED MATERNAL AGE IN FIRST TRIMESTER: ICD-10-CM

## 2021-11-30 DIAGNOSIS — O09.91 HIGH-RISK PREGNANCY IN FIRST TRIMESTER: Primary | ICD-10-CM

## 2021-11-30 LAB
ABO/RH(D): NORMAL
ALBUMIN UR-MCNC: NEGATIVE MG/DL
ANTIBODY SCREEN: NEGATIVE
APPEARANCE UR: CLEAR
BILIRUB UR QL STRIP: NEGATIVE
COLOR UR AUTO: YELLOW
CREAT UR-MCNC: 26 MG/DL
ERYTHROCYTE [DISTWIDTH] IN BLOOD BY AUTOMATED COUNT: 14.9 % (ref 10–15)
GLUCOSE 1H P 50 G GLC PO SERPL-MCNC: 136 MG/DL (ref 70–129)
GLUCOSE UR STRIP-MCNC: NEGATIVE MG/DL
HCT VFR BLD AUTO: 38.4 % (ref 35–47)
HGB BLD-MCNC: 12.7 G/DL (ref 11.7–15.7)
HGB UR QL STRIP: ABNORMAL
KETONES UR STRIP-MCNC: NEGATIVE MG/DL
LEUKOCYTE ESTERASE UR QL STRIP: NEGATIVE
MCH RBC QN AUTO: 28.5 PG (ref 26.5–33)
MCHC RBC AUTO-ENTMCNC: 33.1 G/DL (ref 31.5–36.5)
MCV RBC AUTO: 86 FL (ref 78–100)
NITRATE UR QL: NEGATIVE
PH UR STRIP: 6.5 [PH] (ref 5–7)
PLATELET # BLD AUTO: 272 10E3/UL (ref 150–450)
PROT UR-MCNC: <0.05 G/L
PROT/CREAT 24H UR: NORMAL MG/G{CREAT}
RBC # BLD AUTO: 4.46 10E6/UL (ref 3.8–5.2)
SP GR UR STRIP: 1.01 (ref 1–1.03)
SPECIMEN EXPIRATION DATE: NORMAL
UROBILINOGEN UR STRIP-ACNC: 0.2 E.U./DL
WBC # BLD AUTO: 8.5 10E3/UL (ref 4–11)

## 2021-11-30 PROCEDURE — 86900 BLOOD TYPING SEROLOGIC ABO: CPT | Performed by: OBSTETRICS & GYNECOLOGY

## 2021-11-30 PROCEDURE — 99207 PR FIRST OB VISIT: CPT | Performed by: OBSTETRICS & GYNECOLOGY

## 2021-11-30 PROCEDURE — 84460 ALANINE AMINO (ALT) (SGPT): CPT | Performed by: OBSTETRICS & GYNECOLOGY

## 2021-11-30 PROCEDURE — 90686 IIV4 VACC NO PRSV 0.5 ML IM: CPT | Performed by: OBSTETRICS & GYNECOLOGY

## 2021-11-30 PROCEDURE — 86901 BLOOD TYPING SEROLOGIC RH(D): CPT | Performed by: OBSTETRICS & GYNECOLOGY

## 2021-11-30 PROCEDURE — 81003 URINALYSIS AUTO W/O SCOPE: CPT | Performed by: OBSTETRICS & GYNECOLOGY

## 2021-11-30 PROCEDURE — 84450 TRANSFERASE (AST) (SGOT): CPT | Performed by: OBSTETRICS & GYNECOLOGY

## 2021-11-30 PROCEDURE — 82950 GLUCOSE TEST: CPT | Performed by: OBSTETRICS & GYNECOLOGY

## 2021-11-30 PROCEDURE — 84156 ASSAY OF PROTEIN URINE: CPT | Performed by: OBSTETRICS & GYNECOLOGY

## 2021-11-30 PROCEDURE — 86762 RUBELLA ANTIBODY: CPT | Performed by: OBSTETRICS & GYNECOLOGY

## 2021-11-30 PROCEDURE — 86803 HEPATITIS C AB TEST: CPT | Performed by: OBSTETRICS & GYNECOLOGY

## 2021-11-30 PROCEDURE — 86850 RBC ANTIBODY SCREEN: CPT | Performed by: OBSTETRICS & GYNECOLOGY

## 2021-11-30 PROCEDURE — 86780 TREPONEMA PALLIDUM: CPT | Performed by: OBSTETRICS & GYNECOLOGY

## 2021-11-30 PROCEDURE — 85027 COMPLETE CBC AUTOMATED: CPT | Performed by: OBSTETRICS & GYNECOLOGY

## 2021-11-30 PROCEDURE — 87086 URINE CULTURE/COLONY COUNT: CPT | Performed by: OBSTETRICS & GYNECOLOGY

## 2021-11-30 PROCEDURE — 36415 COLL VENOUS BLD VENIPUNCTURE: CPT | Performed by: OBSTETRICS & GYNECOLOGY

## 2021-11-30 PROCEDURE — 87389 HIV-1 AG W/HIV-1&-2 AB AG IA: CPT | Performed by: OBSTETRICS & GYNECOLOGY

## 2021-11-30 PROCEDURE — 90471 IMMUNIZATION ADMIN: CPT | Performed by: OBSTETRICS & GYNECOLOGY

## 2021-11-30 PROCEDURE — 82306 VITAMIN D 25 HYDROXY: CPT | Performed by: OBSTETRICS & GYNECOLOGY

## 2021-11-30 PROCEDURE — 87340 HEPATITIS B SURFACE AG IA: CPT | Performed by: OBSTETRICS & GYNECOLOGY

## 2021-11-30 PROCEDURE — 82565 ASSAY OF CREATININE: CPT | Performed by: OBSTETRICS & GYNECOLOGY

## 2021-11-30 NOTE — PROGRESS NOTES
[facility]  Genetic Counseling Consult    Patient: Ana Lilia Arrington YOB: 1985   Date of Service: 21      Ana Lilia Arrington was seen at [facility] for genetic consultation to discuss the options for screening and testing for fetal chromosome abnormalities.  The indication for genetic counseling is aneuploidy screening. The patient was accompanied by her partner, Sal, to today's visit.        Impression/Plan:   As you know, Ana Lilia is a gestational carrier for intended parents, Farhad and Reina Carlisle, who reside in [place]. Farhad was available by telephone for today's appointment. This pregnancy was conceived using a 29 year old egg donor and the intended father's sperm. The embryo also had preimplantation genetic testing for aneuploidy (PGT-A) prior to transfer.      Ana Lilia had an ultrasound and blood draw for NIPT (through [facility] lab).  Results are expected within 7-10 days, and will be available in EPIC.  We will contact her to discuss the results, and a copy will be forwarded to the office of the referring OB provider. Ana Lilia provided verbal permission for results to be left on her voicemail. She requested the results include predicted fetal sex information.     Maternal serum AFP (single marker screen) is recommended after 15 weeks to screen for open neural tube defects. A quad screen should not be performed.    An 18-20 week comprehensive ultrasound is standard of care for all women 35 or older at delivery and a fetal echocardiogram is recommended in all IVF pregnancies.     Pregnancy History:   /Parity:    Age at Delivery: 37 year old  SANDY: 2022, by Ultrasound  Gestational Age: 13w0d    No significant complications or exposures were reported in the current pregnancy.    Pregnancy history:  o 2006: term, , female (Ana Lilia' biological child)  o : term, , female (Ana Lilia' biological child)  o :  term, , male (Iris' biological child)  o 2018: term, IOL, vaginal delivery, female (gestational carrier pregnancy). Pregnancy complicated by preeclampsia/hypertension at 39 weeks.       Family History:   A three-generation pedigree was obtained, and is scanned under the  Media  tab.   The following significant findings were reported by Ana Lilia:    The 30 egg donor reported no significant family history and reportedly has two healthy children.    The intended father and sperm donor, Farhad, reported that his family history is negative for multiple miscarriages, stillbirths, birth defects, intellectual disability, and known genetic conditions.       Carrier Screening:   The egg donor is reported to be . The intended father/sperm donor, Farhad Carlisle, is also of  ancestry. Farhad had expanded carrier screening for recessive genetic conditions. His results were available for review today. Farhad is a carrier for Biotinidase deficiency and Factor XI deficiency. Farhad was available by telephone today and reported that the egg donor is not a carrier for biotinidase deficiency. We reviewed that this would overall reduce the risk for the fetus to have biotinidase deficiency. Farhad reported that the egg donor did not have testing for Factor XI deficiency, an autosomal recessive condition, and understands that there is still a risk for this condition for the fetus. The egg donor's carrier screening results were not available for review today.     Risk Assessment for Chromosome Conditions:   We explained that the risk for fetal chromosome abnormalities increases with maternal age. We discussed specific features of common chromosome abnormalities, including Down syndrome, trisomy 13, trisomy 18, and sex chromosome trisomies.      At age 29 (egg donor's age) at midtrimester, the risk to have a baby with Down syndrome is 1 in 760.     At age 29  (egg donor's age) at midtrimester, the risk to have a baby with any  chromosome abnormality is 1 in 380.     Of note, this pregnancy was conceived by IVF and embryos did have preimplantation genetic for aneuploidy prior to transfer. I discussed with  Sal Sung, and Farhad that PGT-A is a screening test and that a normal PGT-A result significantly reduces but does not eliminate the possibility of aneuploidy. We discussed the limitation of cell free fetal DNA testing following PGT-A. Pursuing multiple screening tests may result in conflicting information in which case the option of invasive testing (see below) would be reviewed again.        Testing Options:   We discussed the following options:   First trimester screening    First trimester ultrasound with nuchal translucency and nasal bone assessments, maternal plasma hCG, JULIETA-A, and AFP measurement    Screens for fetal trisomy 21, trisomy 13, and trisomy 18    Cannot screen for open neural tube defects; maternal serum AFP after 15 weeks is recommended     Non-invasive Prenatal Testing (NIPT)/Cell free fetal DNA testing    Maternal plasma cell-free DNA testing; first trimester ultrasound with nuchal translucency and nasal bone assessment is recommended, when appropriate    Screens for fetal trisomy 21, trisomy 13, trisomy 18, and sex chromosome aneuploidy    Cannot screen for open neural tube defects; maternal serum AFP after 15 weeks is recommended     Chorionic villus sampling (CVS)    Invasive procedure typically performed in the first trimester by which placental villi are obtained for the purpose of chromosome analysis and/or other prenatal genetic analysis    Diagnostic results; >99% sensitivity for fetal chromosome abnormalities    Cannot test for open neural tube defects; maternal serum AFP after 15 weeks is recommended     Genetic Amniocentesis    Invasive procedure typically performed in the second trimester by which amniotic fluid is obtained for the purpose of chromosome analysis and/or other prenatal genetic  analysis    Diagnostic results; >99% sensitivity for fetal chromosome abnormalities    AFAFP measurement tests for open neural tube defects     Comprehensive (Level II) ultrasound: Detailed ultrasound performed between 18-22 weeks gestation to screen for major birth defects and markers for aneuploidy.     Fetal echocardiogram is recommended between 22 and 26 weeks to screen for congenital heart defects. Fetal echocardiogram cannot definitively diagnose or exclude the presence of all congenital heart defects.    We reviewed the benefits and limitations of this testing.  Screening tests provide a risk assessment specific to the pregnancy for certain fetal chromosome abnormalities, but cannot definitively diagnose or exclude a fetal chromosome abnormality.  Follow-up genetic counseling and consideration of diagnostic testing is recommended with any abnormal screening result. Diagnostic tests carry inherent risks- including risk of miscarriage- that require careful consideration.  These tests can detect fetal chromosome abnormalities with greater than 99% certainty.  Results can be compromised by maternal cell contamination or mosaicism, and are limited by the resolution of cytogenetic G-banding technology.  There is no screening nor diagnostic test that can detect all forms of birth defects or mental disability.     It was a pleasure to be involved with Iris s care. Face-to-face time of the meeting was 45 minutes.    Melinda Sutton MS, [facility]  Maternal Fetal Medicine  [facility]  Phone:[phone]

## 2021-12-01 ENCOUNTER — HOSPITAL ENCOUNTER (OUTPATIENT)
Dept: ULTRASOUND IMAGING | Facility: CLINIC | Age: 36
End: 2021-12-01
Attending: OBSTETRICS & GYNECOLOGY
Payer: COMMERCIAL

## 2021-12-01 ENCOUNTER — OFFICE VISIT (OUTPATIENT)
Dept: MATERNAL FETAL MEDICINE | Facility: CLINIC | Age: 36
End: 2021-12-01
Attending: OBSTETRICS & GYNECOLOGY
Payer: COMMERCIAL

## 2021-12-01 ENCOUNTER — TRANSFERRED RECORDS (OUTPATIENT)
Dept: HEALTH INFORMATION MANAGEMENT | Facility: CLINIC | Age: 36
End: 2021-12-01

## 2021-12-01 DIAGNOSIS — Z34.90 SUPERVISION OF NORMAL PREGNANCY: Primary | ICD-10-CM

## 2021-12-01 DIAGNOSIS — Z36.9 FIRST TRIMESTER SCREENING: Primary | ICD-10-CM

## 2021-12-01 DIAGNOSIS — O26.90 PREGNANCY RELATED CONDITION, ANTEPARTUM: ICD-10-CM

## 2021-12-01 DIAGNOSIS — O09.811 PREGNANCY RESULTING FROM IN VITRO FERTILIZATION IN FIRST TRIMESTER: ICD-10-CM

## 2021-12-01 LAB
ALT SERPL W P-5'-P-CCNC: 15 U/L (ref 0–50)
AST SERPL W P-5'-P-CCNC: 13 U/L (ref 0–45)
BACTERIA UR CULT: NO GROWTH
CREAT SERPL-MCNC: 0.48 MG/DL (ref 0.52–1.04)
DEPRECATED CALCIDIOL+CALCIFEROL SERPL-MC: 57 UG/L (ref 20–75)
GFR SERPL CREATININE-BSD FRML MDRD: >90 ML/MIN/1.73M2
HBV SURFACE AG SERPL QL IA: NONREACTIVE
HCV AB SERPL QL IA: NONREACTIVE
HIV 1+2 AB+HIV1 P24 AG SERPL QL IA: NONREACTIVE
RUBV IGG SERPL QL IA: 20.2 INDEX
RUBV IGG SERPL QL IA: POSITIVE
T PALLIDUM AB SER QL: NONREACTIVE

## 2021-12-01 PROCEDURE — 36415 COLL VENOUS BLD VENIPUNCTURE: CPT | Performed by: GENETIC COUNSELOR, MS

## 2021-12-01 PROCEDURE — 76813 OB US NUCHAL MEAS 1 GEST: CPT | Mod: 26 | Performed by: OBSTETRICS & GYNECOLOGY

## 2021-12-01 PROCEDURE — 96040 HC GENETIC COUNSELING, EACH 30 MINUTES: CPT | Performed by: GENETIC COUNSELOR, MS

## 2021-12-01 PROCEDURE — 76813 OB US NUCHAL MEAS 1 GEST: CPT

## 2021-12-01 NOTE — PROGRESS NOTES
"Please see \"Imaging\" tab under \"Chart Review\" for details of today's US at the [facility].    Berry Coe MD  Maternal-Fetal Medicine      "

## 2021-12-06 ENCOUNTER — LAB (OUTPATIENT)
Dept: LAB | Facility: CLINIC | Age: 36
End: 2021-12-06
Payer: COMMERCIAL

## 2021-12-06 ENCOUNTER — TELEPHONE (OUTPATIENT)
Dept: MATERNAL FETAL MEDICINE | Facility: CLINIC | Age: 36
End: 2021-12-06

## 2021-12-06 DIAGNOSIS — R73.09 ABNORMAL GLUCOSE TOLERANCE TEST: ICD-10-CM

## 2021-12-06 LAB — SCANNED LAB RESULT: NORMAL

## 2021-12-06 PROCEDURE — 82952 GTT-ADDED SAMPLES: CPT

## 2021-12-06 PROCEDURE — 82951 GLUCOSE TOLERANCE TEST (GTT): CPT

## 2021-12-06 PROCEDURE — 36415 COLL VENOUS BLD VENIPUNCTURE: CPT

## 2021-12-06 NOTE — TELEPHONE ENCOUNTER
Called and discussed low risk cell free fetal DNA results with Iris. Results indicate a low risk for fetal aneuploidy involving chromosomes 21, 18, 13, or sex chromosomes (XX). Fetal sex (female) was disclosed per patient wishes. This puts her current pregnancy at low risk for Down syndrome, trisomy 18, trisomy 13 and sex chromosome abnormalities. Although these results are reassuring, this does not replace a standard chromosome analysis from a chorionic villus sampling or amniocentesis.     Plan:The patient is scheduld to return to [facility] fr follow-up on 12/23/21 and 1/12/22. MSAFP is the appropriate second trimester screening test for open neural tube defects; the maternal quad screen is not recommended. Her results are available in her Epic chart for her primary OB to review.    Melinda Sutton MS, [facility]  Maternal Fetal Medicine  [phone]

## 2021-12-07 LAB
GESTATIONAL GTT 2 HR POST DOSE: 146 MG/DL (ref 60–154)
GESTATIONAL GTT 3 HR POST DOSE: 107 MG/DL (ref 60–139)
GLUCOSE P FAST SERPL-MCNC: 84 MG/DL (ref 60–94)

## 2021-12-08 LAB — GESTATIONAL GTT 1 HR POST DOSE: 169 MG/DL (ref 60–179)

## 2021-12-23 ENCOUNTER — HOSPITAL ENCOUNTER (OUTPATIENT)
Dept: ULTRASOUND IMAGING | Facility: CLINIC | Age: 36
End: 2021-12-23
Attending: OBSTETRICS & GYNECOLOGY
Payer: COMMERCIAL

## 2021-12-23 ENCOUNTER — OFFICE VISIT (OUTPATIENT)
Dept: MATERNAL FETAL MEDICINE | Facility: CLINIC | Age: 36
End: 2021-12-23
Attending: OBSTETRICS & GYNECOLOGY
Payer: COMMERCIAL

## 2021-12-23 DIAGNOSIS — O09.811 PREGNANCY RESULTING FROM IN VITRO FERTILIZATION IN FIRST TRIMESTER: Primary | ICD-10-CM

## 2021-12-23 PROCEDURE — 76815 OB US LIMITED FETUS(S): CPT

## 2021-12-23 PROCEDURE — 76815 OB US LIMITED FETUS(S): CPT | Mod: 26 | Performed by: OBSTETRICS & GYNECOLOGY

## 2022-01-12 ENCOUNTER — HOSPITAL ENCOUNTER (OUTPATIENT)
Dept: ULTRASOUND IMAGING | Facility: CLINIC | Age: 37
End: 2022-01-12
Attending: OBSTETRICS & GYNECOLOGY
Payer: COMMERCIAL

## 2022-01-12 ENCOUNTER — OFFICE VISIT (OUTPATIENT)
Dept: MATERNAL FETAL MEDICINE | Facility: CLINIC | Age: 37
End: 2022-01-12
Attending: OBSTETRICS & GYNECOLOGY
Payer: COMMERCIAL

## 2022-01-12 DIAGNOSIS — O09.811 PREGNANCY RESULTING FROM IN VITRO FERTILIZATION IN FIRST TRIMESTER: ICD-10-CM

## 2022-01-12 DIAGNOSIS — O09.812 PREGNANCY RESULTING FROM IN VITRO FERTILIZATION, SECOND TRIMESTER: Primary | ICD-10-CM

## 2022-01-12 PROCEDURE — 76811 OB US DETAILED SNGL FETUS: CPT

## 2022-01-12 PROCEDURE — 76811 OB US DETAILED SNGL FETUS: CPT | Mod: 26 | Performed by: OBSTETRICS & GYNECOLOGY

## 2022-01-12 NOTE — PROGRESS NOTES
"Please see \"Imaging\" tab under Chart Review for full details.    Aidee Ruelas MD  Maternal Fetal Medicine    "

## 2022-01-15 ENCOUNTER — HEALTH MAINTENANCE LETTER (OUTPATIENT)
Age: 37
End: 2022-01-15

## 2022-01-18 ENCOUNTER — PRENATAL OFFICE VISIT (OUTPATIENT)
Dept: OBGYN | Facility: CLINIC | Age: 37
End: 2022-01-18
Payer: COMMERCIAL

## 2022-01-18 VITALS
OXYGEN SATURATION: 97 % | DIASTOLIC BLOOD PRESSURE: 72 MMHG | SYSTOLIC BLOOD PRESSURE: 120 MMHG | HEART RATE: 85 BPM | BODY MASS INDEX: 32.6 KG/M2 | WEIGHT: 202 LBS

## 2022-01-18 DIAGNOSIS — O09.522 MULTIGRAVIDA OF ADVANCED MATERNAL AGE IN SECOND TRIMESTER: Primary | ICD-10-CM

## 2022-01-18 DIAGNOSIS — O09.812 PREGNANCY RESULTING FROM IN VITRO FERTILIZATION IN SECOND TRIMESTER: ICD-10-CM

## 2022-01-18 DIAGNOSIS — Z33.3 PREGNANT STATE, GESTATIONAL CARRIER: ICD-10-CM

## 2022-01-18 PROCEDURE — 99207 PR PRENATAL VISIT: CPT | Performed by: OBSTETRICS & GYNECOLOGY

## 2022-01-18 NOTE — PROGRESS NOTES
20w2d  Feeling well.  No complaints today.  IP on Facetime for part of the appointment.  Already working with social workers in the hospital to ensure all documentation is taken care of prior to delivery.  Elevated early GCT, but passed GTT.  Discussed will need to repeat glucose testing 24-28w.  Suggested just doing GTT, as high likelihood of GCT elevation again.  She is bummed to have to test again, but agrees with just doing GTT.  Level II WNL.  Fetal echo scheduled.  RTC 4w.  Marybel Monroe MD

## 2022-02-15 ENCOUNTER — HOSPITAL ENCOUNTER (OUTPATIENT)
Dept: CARDIOLOGY | Facility: CLINIC | Age: 37
Discharge: HOME | End: 2022-02-15
Attending: OBSTETRICS & GYNECOLOGY | Admitting: OBSTETRICS & GYNECOLOGY
Payer: COMMERCIAL

## 2022-02-15 ENCOUNTER — OFFICE VISIT (OUTPATIENT)
Dept: CARDIOLOGY | Facility: CLINIC | Age: 37
End: 2022-02-15
Payer: COMMERCIAL

## 2022-02-15 DIAGNOSIS — O35.BXX0 FETAL CARDIAC DISEASE AFFECTING PREGNANCY, SINGLE OR UNSPECIFIED FETUS: Primary | ICD-10-CM

## 2022-02-15 DIAGNOSIS — O09.811 PREGNANCY RESULTING FROM IN VITRO FERTILIZATION IN FIRST TRIMESTER: ICD-10-CM

## 2022-02-15 PROCEDURE — 99202 OFFICE O/P NEW SF 15 MIN: CPT | Mod: 25 | Performed by: PEDIATRICS

## 2022-02-15 PROCEDURE — 93325 DOPPLER ECHO COLOR FLOW MAPG: CPT | Mod: 26 | Performed by: PEDIATRICS

## 2022-02-15 PROCEDURE — 76825 ECHO EXAM OF FETAL HEART: CPT | Mod: 26 | Performed by: PEDIATRICS

## 2022-02-15 PROCEDURE — 93325 DOPPLER ECHO COLOR FLOW MAPG: CPT

## 2022-02-15 PROCEDURE — 76827 ECHO EXAM OF FETAL HEART: CPT | Mod: 26 | Performed by: PEDIATRICS

## 2022-02-15 NOTE — PROGRESS NOTES
[facility]   Heart Center Fetal Consult Note    Patient:  Ana Lilia Arrington MRN:  [id number]   YOB: 1985 Age:  37 year old   Date of Visit:  2/15/2022 PCP:  Gill Verma APRN CNP     Dear Doctor,     I had the pleasure of seeing Ana Lilia Arrington at the [facility] on 2/15/2022 in fetal cardiology consultation for fetal echocardiogram results. She presented today accompanied by her . As you know, she is a 37 year old  at 24w2d who presented for fetal echocardiogram today because of IVF pregnancy.    I performed and interpreted the fetal echocardiogram today, which demonstrated normal fetal cardiac anatomy. Normal fetal intracardiac connections. Normal right and left ventricular size and function. No hydrops.     I reviewed the echo findings today with Ana Lilia Arrington and her partner. She is aware that the study was within normal limits with no major cardiac abnormalities. She is aware of the general limitations of fetal echocardiography. No additional fetal echocardiograms are recommended. No  cardiac follow-up is required.     Thank you for allowing me to participate in Ana Lilia's care. Please do not hesitate to contact me with questions or concerns.    This visit was separate from the performance and interpretation of the ultrasound. The majority of the time (>50%) was spent in counseling and coordination of care. I spent approximately 15 minutes in face-to-face time reviewing the above considerations.    Pipe Simon M.D.  Pediatric Cardiology  [address], 5th floor, [facility] [place] [address]  Phone [phone]  Fax [phone]     RT Nebulizer Bronchodilator Protocol Note    There is a bronchodilator order in the chart from a provider indicating to follow the RT Bronchodilator Protocol and there is an Initiate RT Bronchodilator Protocol order as well (see protocol at bottom of note). The findings from the last RT Protocol Assessment were as follows:  Smoking: Non smoker  Surgical Status: No surgery  Xray: Multiple lobe infiltrates/atelectasis/pleural effusion/edema  Respiratory Pattern: RR 12-20  Mental Status: Alert and Oriented  Breath Sounds: Scattered wheeze  Cough: Strong, productive  Activity Level: Mostly sedentary, minimal walking  Oxygen Requirement: 29% or 3LNC - 5LNC/40%  Indication for Bronchodilator Therapy: On home bronchodilators  Bronchodilator Assessment Score: 6    Aerosolized bronchodilator medication orders have been revised according to the RT Bronchodilator Protocol. RT Bronchodilator Protocol:    Respiratory Therapist to perform RT Therapy Protocol Assessment then follow the protocol. No Indications - adjust the frequency to every 6 hours PRN wheezing or bronchospasm, if no treatments needed after 48 hours then discontinue using Per Protocol order mode. If indication present, adjust the RT bronchodilator orders based on the Bronchodilator Assessment Score as follows:    0-6 - enter or revise RT Bronchodilator order to Albuterol Nebulizer order with frequency of every 2 hours PRN for wheezing or increased work of breathing using Per Protocol order mode. Repeat RT Therapy Protocol Assessment as needed. 7-10 - discontinue any other Inpatient aerosolized bronchodilator medication orders and enter or revise two Albuterol Nebulizer orders, one with BID frequency and one with frequency of every 2 hours PRN wheezing or increased work of breathing using Per Protocol order mode. Repeat RT Therapy Protocol Assessment with second treatment then BID and as needed.       11-13 - discontinue any other Inpatient aerosolized bronchodilator medication orders and enter [employer] order with QID frequency and an Albuterol Nebulizer order with frequency of every 2 hours PRN wheezing or increased work of breathing using Per Protocol order mode. Repeat RT Therapy Protocol Assessment with second treatment then QID and as needed. Greater than 13 - discontinue any other Inpatient aerosolized bronchodilator medication orders and enter a DuoNeb Nebulizer order with every 4 hours frequency and an Albuterol Nebulizer order with frequency of every 2 hours PRN wheezing or increased work of breathing using Per Protocol order mode. Repeat RT Therapy Protocol Assessment with second treatment then every 4 hours and as needed. RT to enter RT Home Evaluation for COPD & MDI Assessment order using Per Protocol order mode.     Electronically signed by Kathya Harmon RCP on 6/18/2021 at 8:31 PM

## 2022-02-17 NOTE — PROGRESS NOTES
24w5d  Doing well recently.  Still having some big frustrations regarding insurance coverage, but pregnancy-wise doing well.  Normal fetal echo recently.  Just doing three our GTT today, as failed early 1 hour GCT.  Discussed would need to start QID testing if abnormal.  RTC 4w.  Tdap at that time.  Marybel Monroe MD

## 2022-02-18 ENCOUNTER — PRENATAL OFFICE VISIT (OUTPATIENT)
Dept: OBGYN | Facility: CLINIC | Age: 37
End: 2022-02-18
Payer: COMMERCIAL

## 2022-02-18 VITALS
BODY MASS INDEX: 33.36 KG/M2 | SYSTOLIC BLOOD PRESSURE: 108 MMHG | OXYGEN SATURATION: 97 % | DIASTOLIC BLOOD PRESSURE: 67 MMHG | HEART RATE: 77 BPM | WEIGHT: 206.7 LBS

## 2022-02-18 DIAGNOSIS — O09.522 MULTIGRAVIDA OF ADVANCED MATERNAL AGE IN SECOND TRIMESTER: Primary | ICD-10-CM

## 2022-02-18 PROCEDURE — 82952 GTT-ADDED SAMPLES: CPT | Performed by: OBSTETRICS & GYNECOLOGY

## 2022-02-18 PROCEDURE — 99207 PR PRENATAL VISIT: CPT | Performed by: OBSTETRICS & GYNECOLOGY

## 2022-02-18 PROCEDURE — 82951 GLUCOSE TOLERANCE TEST (GTT): CPT | Performed by: OBSTETRICS & GYNECOLOGY

## 2022-02-18 PROCEDURE — 36415 COLL VENOUS BLD VENIPUNCTURE: CPT | Performed by: OBSTETRICS & GYNECOLOGY

## 2022-02-19 LAB
GESTATIONAL GTT 1 HR POST DOSE: 157 MG/DL (ref 60–179)
GESTATIONAL GTT 2 HR POST DOSE: 118 MG/DL (ref 60–154)
GESTATIONAL GTT 3 HR POST DOSE: 117 MG/DL (ref 60–139)
GLUCOSE P FAST SERPL-MCNC: 89 MG/DL (ref 60–94)

## 2022-03-18 ENCOUNTER — PRENATAL OFFICE VISIT (OUTPATIENT)
Dept: OBGYN | Facility: CLINIC | Age: 37
End: 2022-03-18
Payer: COMMERCIAL

## 2022-03-18 VITALS
BODY MASS INDEX: 33.25 KG/M2 | OXYGEN SATURATION: 97 % | HEART RATE: 72 BPM | WEIGHT: 206 LBS | DIASTOLIC BLOOD PRESSURE: 69 MMHG | SYSTOLIC BLOOD PRESSURE: 106 MMHG

## 2022-03-18 DIAGNOSIS — Z23 NEED FOR TDAP VACCINATION: ICD-10-CM

## 2022-03-18 DIAGNOSIS — O26.843 UTERINE SIZE-DATE DISCREPANCY IN THIRD TRIMESTER: ICD-10-CM

## 2022-03-18 DIAGNOSIS — O09.523 MULTIGRAVIDA OF ADVANCED MATERNAL AGE IN THIRD TRIMESTER: Primary | ICD-10-CM

## 2022-03-18 DIAGNOSIS — Z33.3 PREGNANT STATE, GESTATIONAL CARRIER: ICD-10-CM

## 2022-03-18 DIAGNOSIS — O09.812 PREGNANCY RESULTING FROM IN VITRO FERTILIZATION IN SECOND TRIMESTER: ICD-10-CM

## 2022-03-18 PROCEDURE — 90471 IMMUNIZATION ADMIN: CPT | Performed by: OBSTETRICS & GYNECOLOGY

## 2022-03-18 PROCEDURE — 90715 TDAP VACCINE 7 YRS/> IM: CPT | Performed by: OBSTETRICS & GYNECOLOGY

## 2022-03-18 PROCEDURE — 99207 PR PRENATAL VISIT: CPT | Performed by: OBSTETRICS & GYNECOLOGY

## 2022-03-18 NOTE — PROGRESS NOTES
28w5d  Doing well since last visit.  Occ cramping/contractions that resolve usually with activity.  No leakage of fluid or vaginal bleeding.  +FM  Fortuantely, insurance stuff was recently all figured out, so they're no longer getting calls about bills.  Discussed hx of pre-eclampsia with severe features.  BP normal lately.  S>D, so will check growth US with next visit  RTC 2-3w.  Marybel Monroe MD

## 2022-04-11 ENCOUNTER — TELEPHONE (OUTPATIENT)
Dept: OBGYN | Facility: CLINIC | Age: 37
End: 2022-04-11
Payer: COMMERCIAL

## 2022-04-11 NOTE — TELEPHONE ENCOUNTER
----- Message from Marybel Monroe MD sent at 4/9/2022 10:38 PM CDT -----  Regarding: needs in person visit  This patient is scheduled for phone prenatal appointment with me on Thursday.  She should really have an in person visit given her gestational age and complications in previous pregnancy.  Please reach out to her and ask if she's able to come into the office instead.      Thanks,  Marybel Monroe MD

## 2022-04-13 NOTE — PROGRESS NOTES
32w4d   Getting more uncomfortable, pelvic pressure and round ligament pain. Will try maternity support belt.  No s/sx of PTL.  Normal FM.  Growth US today shows EFW 30%ile, AC 60%ile.  Reports all required legal documentation has been provided to the hospital.  RTC 2w  Marybel Monroe MD

## 2022-04-14 ENCOUNTER — PRENATAL OFFICE VISIT (OUTPATIENT)
Dept: OBGYN | Facility: CLINIC | Age: 37
End: 2022-04-14
Attending: OBSTETRICS & GYNECOLOGY

## 2022-04-14 ENCOUNTER — HOSPITAL ENCOUNTER (OUTPATIENT)
Dept: ULTRASOUND IMAGING | Facility: CLINIC | Age: 37
Discharge: HOME | End: 2022-04-14
Attending: OBSTETRICS & GYNECOLOGY | Admitting: OBSTETRICS & GYNECOLOGY
Payer: COMMERCIAL

## 2022-04-14 VITALS
OXYGEN SATURATION: 96 % | BODY MASS INDEX: 33.46 KG/M2 | SYSTOLIC BLOOD PRESSURE: 105 MMHG | WEIGHT: 207.3 LBS | DIASTOLIC BLOOD PRESSURE: 68 MMHG | HEART RATE: 78 BPM

## 2022-04-14 DIAGNOSIS — O09.812 PREGNANCY RESULTING FROM IN VITRO FERTILIZATION IN SECOND TRIMESTER: ICD-10-CM

## 2022-04-14 DIAGNOSIS — O09.523 MULTIGRAVIDA OF ADVANCED MATERNAL AGE IN THIRD TRIMESTER: ICD-10-CM

## 2022-04-14 DIAGNOSIS — O09.523 MULTIGRAVIDA OF ADVANCED MATERNAL AGE IN THIRD TRIMESTER: Primary | ICD-10-CM

## 2022-04-14 DIAGNOSIS — O26.899 PELVIC PAIN IN PREGNANCY: ICD-10-CM

## 2022-04-14 DIAGNOSIS — O26.843 UTERINE SIZE-DATE DISCREPANCY IN THIRD TRIMESTER: ICD-10-CM

## 2022-04-14 DIAGNOSIS — Z33.3 PREGNANT STATE, GESTATIONAL CARRIER: ICD-10-CM

## 2022-04-14 DIAGNOSIS — R10.2 PELVIC PAIN IN PREGNANCY: ICD-10-CM

## 2022-04-14 PROCEDURE — 99207 PR PRENATAL VISIT: CPT | Performed by: OBSTETRICS & GYNECOLOGY

## 2022-04-14 PROCEDURE — 76816 OB US FOLLOW-UP PER FETUS: CPT | Mod: 26 | Performed by: RADIOLOGY

## 2022-04-14 PROCEDURE — 76816 OB US FOLLOW-UP PER FETUS: CPT

## 2022-04-26 ENCOUNTER — PRENATAL OFFICE VISIT (OUTPATIENT)
Dept: OBGYN | Facility: CLINIC | Age: 37
End: 2022-04-26
Payer: COMMERCIAL

## 2022-04-26 VITALS
OXYGEN SATURATION: 96 % | HEART RATE: 88 BPM | SYSTOLIC BLOOD PRESSURE: 107 MMHG | DIASTOLIC BLOOD PRESSURE: 73 MMHG | BODY MASS INDEX: 33.73 KG/M2 | WEIGHT: 209 LBS

## 2022-04-26 DIAGNOSIS — O36.8390 ABNORMALITY IN FETAL HEART RATE OR RHYTHM, ANTEPARTUM: ICD-10-CM

## 2022-04-26 DIAGNOSIS — Z33.3 PREGNANT STATE, GESTATIONAL CARRIER: ICD-10-CM

## 2022-04-26 DIAGNOSIS — O09.812 PREGNANCY RESULTING FROM IN VITRO FERTILIZATION IN SECOND TRIMESTER: ICD-10-CM

## 2022-04-26 DIAGNOSIS — O09.523 MULTIGRAVIDA OF ADVANCED MATERNAL AGE IN THIRD TRIMESTER: Primary | ICD-10-CM

## 2022-04-26 PROCEDURE — 59025 FETAL NON-STRESS TEST: CPT | Performed by: OBSTETRICS & GYNECOLOGY

## 2022-04-26 PROCEDURE — 99207 PR PRENATAL VISIT: CPT | Performed by: OBSTETRICS & GYNECOLOGY

## 2022-04-27 ENCOUNTER — TELEPHONE (OUTPATIENT)
Dept: OBGYN | Facility: CLINIC | Age: 37
End: 2022-04-27
Payer: COMMERCIAL

## 2022-04-27 NOTE — TELEPHONE ENCOUNTER
Pt called and states that her employer has not received her [employer] paperwork. It was scanned in on 4/14 and marked as sent. Patient requesting we send it again and also send a copy to her "[employer]hart.

## 2022-05-13 ENCOUNTER — PRENATAL OFFICE VISIT (OUTPATIENT)
Dept: OBGYN | Facility: CLINIC | Age: 37
End: 2022-05-13
Payer: COMMERCIAL

## 2022-05-13 VITALS
BODY MASS INDEX: 33.73 KG/M2 | DIASTOLIC BLOOD PRESSURE: 69 MMHG | HEART RATE: 79 BPM | OXYGEN SATURATION: 97 % | WEIGHT: 209 LBS | SYSTOLIC BLOOD PRESSURE: 108 MMHG

## 2022-05-13 DIAGNOSIS — O09.523 MULTIGRAVIDA OF ADVANCED MATERNAL AGE IN THIRD TRIMESTER: Primary | ICD-10-CM

## 2022-05-13 LAB
HGB BLD-MCNC: 12.3 G/DL (ref 11.7–15.7)
HOLD SPECIMEN: NORMAL

## 2022-05-13 PROCEDURE — 87653 STREP B DNA AMP PROBE: CPT | Performed by: OBSTETRICS & GYNECOLOGY

## 2022-05-13 PROCEDURE — 99207 PR PRENATAL VISIT: CPT | Performed by: OBSTETRICS & GYNECOLOGY

## 2022-05-13 PROCEDURE — 36415 COLL VENOUS BLD VENIPUNCTURE: CPT | Performed by: OBSTETRICS & GYNECOLOGY

## 2022-05-13 NOTE — PROGRESS NOTES
36w5d  Doing well overall.  Feeling more pressure, especially when more active during the day.  No s/sx of pre-eclampsia.  Normal movement.  Baby feels bigger than previous babies.  Will get growth US, although did discuss limitations of growth US.  IP on facetime today, desiring more specific information about timing of delivery.  Discussed inability to predict timing.  Labor, ROM, pre-eclamsia, etc could start at any time.  Encouraged them to make preparations asap.  Reviewed s/sx of pre-e in detail and instructed her to call with any concerns or new symptoms.  Cephalic by BSUS.  GBS and hgb today.  RTC weekly until delivery.  Marybel Monroe MD

## 2022-05-14 LAB — GP B STREP DNA SPEC QL NAA+PROBE: POSITIVE

## 2022-05-20 ENCOUNTER — ANCILLARY PROCEDURE (OUTPATIENT)
Dept: ULTRASOUND IMAGING | Facility: CLINIC | Age: 37
End: 2022-05-20
Attending: OBSTETRICS & GYNECOLOGY
Payer: COMMERCIAL

## 2022-05-20 ENCOUNTER — PRENATAL OFFICE VISIT (OUTPATIENT)
Dept: OBGYN | Facility: CLINIC | Age: 37
End: 2022-05-20
Payer: COMMERCIAL

## 2022-05-20 VITALS
DIASTOLIC BLOOD PRESSURE: 70 MMHG | HEART RATE: 87 BPM | TEMPERATURE: 97.4 F | SYSTOLIC BLOOD PRESSURE: 116 MMHG | WEIGHT: 240.1 LBS | BODY MASS INDEX: 38.75 KG/M2 | OXYGEN SATURATION: 100 %

## 2022-05-20 DIAGNOSIS — O09.523 MULTIGRAVIDA OF ADVANCED MATERNAL AGE IN THIRD TRIMESTER: ICD-10-CM

## 2022-05-20 DIAGNOSIS — O09.523 MULTIGRAVIDA OF ADVANCED MATERNAL AGE IN THIRD TRIMESTER: Primary | ICD-10-CM

## 2022-05-20 DIAGNOSIS — Z33.3 PREGNANT STATE, GESTATIONAL CARRIER: ICD-10-CM

## 2022-05-20 PROCEDURE — 76816 OB US FOLLOW-UP PER FETUS: CPT | Performed by: OBSTETRICS & GYNECOLOGY

## 2022-05-20 PROCEDURE — 99207 PR PRENATAL VISIT: CPT | Performed by: OBSTETRICS & GYNECOLOGY

## 2022-05-20 RX ORDER — MISOPROSTOL 200 UG/1
800 TABLET ORAL
Status: CANCELLED | OUTPATIENT
Start: 2022-05-20

## 2022-05-20 RX ORDER — ONDANSETRON 4 MG/1
4 TABLET, ORALLY DISINTEGRATING ORAL EVERY 6 HOURS PRN
Status: CANCELLED | OUTPATIENT
Start: 2022-05-20

## 2022-05-20 RX ORDER — KETOROLAC TROMETHAMINE 30 MG/ML
30 INJECTION, SOLUTION INTRAMUSCULAR; INTRAVENOUS
Status: CANCELLED | OUTPATIENT
Start: 2022-05-20 | End: 2022-05-25

## 2022-05-20 RX ORDER — PROCHLORPERAZINE 25 MG
25 SUPPOSITORY, RECTAL RECTAL EVERY 12 HOURS PRN
Status: CANCELLED | OUTPATIENT
Start: 2022-05-20

## 2022-05-20 RX ORDER — METOCLOPRAMIDE 5 MG/1
10 TABLET ORAL EVERY 6 HOURS PRN
Status: CANCELLED | OUTPATIENT
Start: 2022-05-20

## 2022-05-20 RX ORDER — CITRIC ACID/SODIUM CITRATE 334-500MG
30 SOLUTION, ORAL ORAL
Status: CANCELLED | OUTPATIENT
Start: 2022-05-20

## 2022-05-20 RX ORDER — MISOPROSTOL 100 UG/1
400 TABLET ORAL
Status: CANCELLED | OUTPATIENT
Start: 2022-05-20

## 2022-05-20 RX ORDER — FENTANYL CITRATE 50 UG/ML
50 INJECTION, SOLUTION INTRAMUSCULAR; INTRAVENOUS EVERY 30 MIN PRN
Status: CANCELLED | OUTPATIENT
Start: 2022-05-20

## 2022-05-20 RX ORDER — OXYTOCIN/0.9 % SODIUM CHLORIDE 30/500 ML
100-340 PLASTIC BAG, INJECTION (ML) INTRAVENOUS CONTINUOUS PRN
Status: CANCELLED | OUTPATIENT
Start: 2022-05-20

## 2022-05-20 RX ORDER — PROCHLORPERAZINE MALEATE 5 MG
10 TABLET ORAL EVERY 6 HOURS PRN
Status: CANCELLED | OUTPATIENT
Start: 2022-05-20

## 2022-05-20 RX ORDER — OXYTOCIN 10 [USP'U]/ML
10 INJECTION, SOLUTION INTRAMUSCULAR; INTRAVENOUS
Status: CANCELLED | OUTPATIENT
Start: 2022-05-20

## 2022-05-20 RX ORDER — NALOXONE HYDROCHLORIDE 0.4 MG/ML
0.2 INJECTION, SOLUTION INTRAMUSCULAR; INTRAVENOUS; SUBCUTANEOUS
Status: CANCELLED | OUTPATIENT
Start: 2022-05-20

## 2022-05-20 RX ORDER — AMOXICILLIN 250 MG
1 CAPSULE ORAL DAILY
COMMUNITY
Start: 2022-05-20 | End: 2022-06-19

## 2022-05-20 RX ORDER — METOCLOPRAMIDE HYDROCHLORIDE 5 MG/ML
10 INJECTION INTRAMUSCULAR; INTRAVENOUS EVERY 6 HOURS PRN
Status: CANCELLED | OUTPATIENT
Start: 2022-05-20

## 2022-05-20 RX ORDER — SODIUM CHLORIDE, SODIUM LACTATE, POTASSIUM CHLORIDE, CALCIUM CHLORIDE 600; 310; 30; 20 MG/100ML; MG/100ML; MG/100ML; MG/100ML
INJECTION, SOLUTION INTRAVENOUS CONTINUOUS
Status: CANCELLED | OUTPATIENT
Start: 2022-05-20

## 2022-05-20 RX ORDER — CARBOPROST TROMETHAMINE 250 UG/ML
250 INJECTION, SOLUTION INTRAMUSCULAR
Status: CANCELLED | OUTPATIENT
Start: 2022-05-20

## 2022-05-20 RX ORDER — ACETAMINOPHEN 325 MG/1
650 TABLET ORAL EVERY 6 HOURS PRN
COMMUNITY
Start: 2022-05-20 | End: 2022-06-19

## 2022-05-20 RX ORDER — LIDOCAINE 40 MG/G
CREAM TOPICAL
Status: CANCELLED | OUTPATIENT
Start: 2022-05-20

## 2022-05-20 RX ORDER — OXYTOCIN/0.9 % SODIUM CHLORIDE 30/500 ML
340 PLASTIC BAG, INJECTION (ML) INTRAVENOUS CONTINUOUS PRN
Status: CANCELLED | OUTPATIENT
Start: 2022-05-20

## 2022-05-20 RX ORDER — NALOXONE HYDROCHLORIDE 0.4 MG/ML
0.4 INJECTION, SOLUTION INTRAMUSCULAR; INTRAVENOUS; SUBCUTANEOUS
Status: CANCELLED | OUTPATIENT
Start: 2022-05-20

## 2022-05-20 RX ORDER — ONDANSETRON 2 MG/ML
4 INJECTION INTRAMUSCULAR; INTRAVENOUS EVERY 6 HOURS PRN
Status: CANCELLED | OUTPATIENT
Start: 2022-05-20

## 2022-05-20 RX ORDER — IBUPROFEN 200 MG
800 TABLET ORAL
Status: CANCELLED | OUTPATIENT
Start: 2022-05-20 | End: 2022-05-25

## 2022-05-20 RX ORDER — ACETAMINOPHEN 325 MG/1
650 TABLET ORAL EVERY 4 HOURS PRN
Status: CANCELLED | OUTPATIENT
Start: 2022-05-20

## 2022-05-20 RX ORDER — IBUPROFEN 200 MG
600 TABLET ORAL EVERY 6 HOURS PRN
COMMUNITY
Start: 2022-05-20 | End: 2022-06-19

## 2022-05-20 RX ORDER — METHYLERGONOVINE MALEATE 0.2 MG/ML
200 INJECTION INTRAVENOUS
Status: CANCELLED | OUTPATIENT
Start: 2022-05-20

## 2022-05-20 ASSESSMENT — PATIENT HEALTH QUESTIONNAIRE - PHQ9: SUM OF ALL RESPONSES TO PHQ QUESTIONS 1-9: 0

## 2022-05-20 NOTE — PROGRESS NOTES
GBS: Positive  Hemoglobin   Date Value Ref Range Status   05/13/2022 12.3 11.7 - 15.7 g/dL Final   07/09/2021 12.4 11.7 - 15.7 g/dL Final   ]    Breast pump rx: na  Labor orders: signed and held  Birth plan: epidural, spontaneous labor, intended parents plan to come  Length of stay: discussed  Disability paperwork: completed  Resident involvement: discussed and agrees.  PP meds OTC    Good fetal movement.   Having some contractions.   States she has not yet met with social work to make arrangements surrounding delivering as a gestational carrier. Referral entered.   Intended parents have rented an apartment close to here for time of birth.   RTC weekly

## 2022-05-20 NOTE — Clinical Note
Chris Sommer,  This patient is a gestational carrier. I am seeing her for the first time, and I don't see anything about her having done the steps with the birthplace to prepare for delivery. She is 37 weeks 5 days. I put a care coordination order in, is there anything else I need to do?  Thanks,  Terrie

## 2022-05-22 ENCOUNTER — HOSPITAL ENCOUNTER (OUTPATIENT)
Facility: CLINIC | Age: 37
Discharge: HOME | End: 2022-05-22
Attending: OBSTETRICS & GYNECOLOGY | Admitting: OBSTETRICS & GYNECOLOGY
Payer: COMMERCIAL

## 2022-05-22 ENCOUNTER — HOSPITAL ENCOUNTER (OUTPATIENT)
Facility: CLINIC | Age: 37
End: 2022-05-22
Admitting: OBSTETRICS & GYNECOLOGY
Payer: COMMERCIAL

## 2022-05-22 ENCOUNTER — E-VISIT (OUTPATIENT)
Dept: OBGYN | Facility: CLINIC | Age: 37
End: 2022-05-22
Payer: COMMERCIAL

## 2022-05-22 VITALS — RESPIRATION RATE: 18 BRPM | TEMPERATURE: 98.4 F | DIASTOLIC BLOOD PRESSURE: 66 MMHG | SYSTOLIC BLOOD PRESSURE: 125 MMHG

## 2022-05-22 DIAGNOSIS — O26.893 PREGNANCY HEADACHE IN THIRD TRIMESTER: Primary | ICD-10-CM

## 2022-05-22 DIAGNOSIS — R51.9 ACUTE NONINTRACTABLE HEADACHE, UNSPECIFIED HEADACHE TYPE: Primary | ICD-10-CM

## 2022-05-22 DIAGNOSIS — R51.9 PREGNANCY HEADACHE IN THIRD TRIMESTER: Primary | ICD-10-CM

## 2022-05-22 LAB
ABO/RH(D): NORMAL
ALT SERPL W P-5'-P-CCNC: 15 U/L (ref 0–50)
ANTIBODY SCREEN: NEGATIVE
AST SERPL W P-5'-P-CCNC: 16 U/L (ref 0–45)
CREAT SERPL-MCNC: 0.53 MG/DL (ref 0.52–1.04)
CREAT UR-MCNC: 31 MG/DL
ERYTHROCYTE [DISTWIDTH] IN BLOOD BY AUTOMATED COUNT: 14.6 % (ref 10–15)
GFR SERPL CREATININE-BSD FRML MDRD: >90 ML/MIN/1.73M2
HCT VFR BLD AUTO: 40.5 % (ref 35–47)
HGB BLD-MCNC: 12.9 G/DL (ref 11.7–15.7)
HOLD SPECIMEN: NORMAL
MCH RBC QN AUTO: 28.4 PG (ref 26.5–33)
MCHC RBC AUTO-ENTMCNC: 31.9 G/DL (ref 31.5–36.5)
MCV RBC AUTO: 89 FL (ref 78–100)
PLATELET # BLD AUTO: 257 10E3/UL (ref 150–450)
PROT UR-MCNC: 0.06 G/L
PROT/CREAT 24H UR: 0.19 G/G CR (ref 0–0.2)
RBC # BLD AUTO: 4.55 10E6/UL (ref 3.8–5.2)
SPECIMEN EXPIRATION DATE: NORMAL
WBC # BLD AUTO: 9 10E3/UL (ref 4–11)

## 2022-05-22 PROCEDURE — 84450 TRANSFERASE (AST) (SGOT): CPT | Performed by: STUDENT IN AN ORGANIZED HEALTH CARE EDUCATION/TRAINING PROGRAM

## 2022-05-22 PROCEDURE — 84156 ASSAY OF PROTEIN URINE: CPT | Performed by: STUDENT IN AN ORGANIZED HEALTH CARE EDUCATION/TRAINING PROGRAM

## 2022-05-22 PROCEDURE — 99207 PR NON-BILLABLE SERV PER CHARTING: CPT | Performed by: OBSTETRICS & GYNECOLOGY

## 2022-05-22 PROCEDURE — 85027 COMPLETE CBC AUTOMATED: CPT | Performed by: STUDENT IN AN ORGANIZED HEALTH CARE EDUCATION/TRAINING PROGRAM

## 2022-05-22 PROCEDURE — 99213 OFFICE O/P EST LOW 20 MIN: CPT | Mod: GC | Performed by: OBSTETRICS & GYNECOLOGY

## 2022-05-22 PROCEDURE — 84460 ALANINE AMINO (ALT) (SGPT): CPT | Performed by: STUDENT IN AN ORGANIZED HEALTH CARE EDUCATION/TRAINING PROGRAM

## 2022-05-22 PROCEDURE — G0463 HOSPITAL OUTPT CLINIC VISIT: HCPCS | Mod: 25

## 2022-05-22 PROCEDURE — 250N000013 HC RX MED GY IP 250 OP 250 PS 637: Performed by: STUDENT IN AN ORGANIZED HEALTH CARE EDUCATION/TRAINING PROGRAM

## 2022-05-22 PROCEDURE — 59025 FETAL NON-STRESS TEST: CPT

## 2022-05-22 PROCEDURE — 82565 ASSAY OF CREATININE: CPT | Performed by: STUDENT IN AN ORGANIZED HEALTH CARE EDUCATION/TRAINING PROGRAM

## 2022-05-22 PROCEDURE — 86901 BLOOD TYPING SEROLOGIC RH(D): CPT | Performed by: STUDENT IN AN ORGANIZED HEALTH CARE EDUCATION/TRAINING PROGRAM

## 2022-05-22 PROCEDURE — 36415 COLL VENOUS BLD VENIPUNCTURE: CPT | Performed by: STUDENT IN AN ORGANIZED HEALTH CARE EDUCATION/TRAINING PROGRAM

## 2022-05-22 RX ORDER — PROCHLORPERAZINE MALEATE 10 MG
10 TABLET ORAL EVERY 6 HOURS PRN
Status: DISCONTINUED | OUTPATIENT
Start: 2022-05-22 | End: 2022-05-22 | Stop reason: HOSPADM

## 2022-05-22 RX ORDER — HYDROXYZINE HYDROCHLORIDE 50 MG/1
50 TABLET, FILM COATED ORAL EVERY 6 HOURS PRN
Status: DISCONTINUED | OUTPATIENT
Start: 2022-05-22 | End: 2022-05-22 | Stop reason: HOSPADM

## 2022-05-22 RX ORDER — ONDANSETRON 2 MG/ML
4 INJECTION INTRAMUSCULAR; INTRAVENOUS EVERY 6 HOURS PRN
Status: DISCONTINUED | OUTPATIENT
Start: 2022-05-22 | End: 2022-05-22 | Stop reason: HOSPADM

## 2022-05-22 RX ORDER — METOCLOPRAMIDE 10 MG/1
10 TABLET ORAL EVERY 6 HOURS PRN
Status: DISCONTINUED | OUTPATIENT
Start: 2022-05-22 | End: 2022-05-22 | Stop reason: HOSPADM

## 2022-05-22 RX ORDER — ONDANSETRON 4 MG/1
4 TABLET, ORALLY DISINTEGRATING ORAL EVERY 6 HOURS PRN
Status: DISCONTINUED | OUTPATIENT
Start: 2022-05-22 | End: 2022-05-22 | Stop reason: HOSPADM

## 2022-05-22 RX ORDER — METOCLOPRAMIDE HYDROCHLORIDE 5 MG/ML
10 INJECTION INTRAMUSCULAR; INTRAVENOUS EVERY 6 HOURS PRN
Status: DISCONTINUED | OUTPATIENT
Start: 2022-05-22 | End: 2022-05-22 | Stop reason: HOSPADM

## 2022-05-22 RX ORDER — CYCLOBENZAPRINE HCL 10 MG
10 TABLET ORAL 3 TIMES DAILY PRN
Qty: 10 TABLET | Refills: 0 | Status: SHIPPED | OUTPATIENT
Start: 2022-05-22 | End: 2023-09-07

## 2022-05-22 RX ORDER — ACETAMINOPHEN 325 MG/1
975 TABLET ORAL EVERY 4 HOURS PRN
Status: DISCONTINUED | OUTPATIENT
Start: 2022-05-22 | End: 2022-05-22 | Stop reason: HOSPADM

## 2022-05-22 RX ORDER — PROCHLORPERAZINE 25 MG
25 SUPPOSITORY, RECTAL RECTAL EVERY 12 HOURS PRN
Status: DISCONTINUED | OUTPATIENT
Start: 2022-05-22 | End: 2022-05-22 | Stop reason: HOSPADM

## 2022-05-22 RX ADMIN — ACETAMINOPHEN 975 MG: 325 TABLET ORAL at 15:20

## 2022-05-22 NOTE — PLAN OF CARE
Data: Patient presented to the Birthplace at 1434.   Reason for maternal/fetal assessment per patient is Rule Out Pre-eclampsia  . Patient is a . Prenatal record reviewed.      OB History    Para Term  AB Living   5 4 4 0 0 4   SAB IAB Ectopic Multiple Live Births   0 0 0 0 4      # Outcome Date GA Lbr Mayank/2nd Weight Sex Delivery Anes PTL Lv   5 Current            4 Term 18 39w2d 03:45 / 00:08 2.54 kg (5 lb 9.6 oz) F Vag-Spont EPI N MICHELINE      Birth Comments: gestational carrier for couple in [place]      Complications: GBS, Preeclampsia/Hypertension      Name: STUARTDEVONIVETH EVANS      Apgar1: 8  Apgar5: 9   3 Term 09/12/10 39w0d 06:00 3.799 kg (8 lb 6 oz) M    MICHELINE      Name: radha   2 Term 08 40w2d 03:00 3.175 kg (7 lb) F    MICHELINE      Name: Marcella   1 Term 06 40w0d 05:00 2.722 kg (6 lb) F    MICHELINE      Name: Estephanie      Medical History:   Past Medical History:   Diagnosis Date     Carrier of group B Streptococcus      Hypertension     only during pregnancy     Migraine      Postpartum depression      Varicella    . Gestational Age 38w0d. VSS. Cervix: not examined.  Fetal movement present. Patient denies cramping, backache, vaginal discharge, pelvic pressure, UTI symptoms, GI problems, bloody show, vaginal bleeding, edema, visual disturbances, epigastric or URQ pain, abdominal pain, rupture of membranes. Support persons present.  Action: Verbal consent for EFM. Triage assessment completed. EFM applied. Uterine assessment rare contractions. Fetal assessment: Presumed adequate fetal oxygenation documented (see flow record). Patient education pamphlets given on fetal movement counts. Patient instructed to report change in fetal movement, vaginal leaking of fluid or bleeding, abdominal pain, or any concerns related to the pregnancy to her nurse/physician.   Response: Dr. Griffin and Dr. Coello informed of bp, labs. Plan per provider is discharge home, flexeril to patient's  pharmacy. Patient verbalized understanding of education and verbalized agreement with plan. Discharged ambulatory at 1723.

## 2022-05-22 NOTE — PLAN OF CARE
Goal Outcome Evaluation:    Plan of Care Reviewed With: patient     Overall Patient Progress: no change         Patient presented to L&D for rule out pre-e.  Iris states she has a headache that began yesterday afternoon, unrelieved by tylenol or sleep.  Denies vision changes, RUQ pain, or difficulty taking a deep breath.  VS WNL.  FHR category 1.  No ctx noted.  Labs drawn and tylenol given.  Plan to send urine and do BPs q30min.  Report given to MARTINE Rashid, care transferred.

## 2022-05-22 NOTE — TELEPHONE ENCOUNTER
TC to patient, recommended that she come to L&D to be evaluated for possible pre-eclampsia.     Teresita Coello MD

## 2022-05-22 NOTE — PROGRESS NOTES
[facility]  OB Triage Note      HPI: Ms. Ana Lilia Arrington is a 37 year old  at 38w0d by embryo transfer, who presents with a headache that started yesterday and hasn't improved with rest and tylenol. She says it's mainly frontal and constant, sometimes throbbing. She denies contractions, leaking fluid, vaginal bleeding.  + Good fetal movement. Denies vision changes, chest pain, SOB, nausea, vomiting, RUQ pain, fevers chills, dysuria, foul-odor discharge.    Obstetric Complications  - hx of pre-E w/ SF with last delivery  - GBS pos    O:  Patient Vitals for the past 24 hrs:   BP Temp Temp src Resp   22 1630 124/71 -- -- --   22 1600 118/63 -- -- --   22 1530 114/71 -- -- 18   22 1500 122/58 -- -- --   22 1444 132/80 98.4  F (36.9  C) Oral 18     Gen: Well-appearing, NAD  CV: Well perfused  Pulm: Normal rate and effort  Abd: Soft, non-tender, non-distended  Ext: no LE edema b/l    FHT: Baseline 140, moderate variability, + accels, - decels  [place]: 0 ctx in 10 mins    Labs:  Component      Latest Ref Rng & Units 2022   WBC      4.0 - 11.0 10e3/uL 9.0   RBC Count      3.80 - 5.20 10e6/uL 4.55   Hemoglobin      11.7 - 15.7 g/dL 12.9   Hematocrit      35.0 - 47.0 % 40.5   MCV      78 - 100 fL 89   MCH      26.5 - 33.0 pg 28.4   MCHC      31.5 - 36.5 g/dL 31.9   RDW      10.0 - 15.0 % 14.6   Platelet Count      150 - 450 10e3/uL 257   Creatinine      0.52 - 1.04 mg/dL 0.53   GFR Estimate      >60 mL/min/1.73m2 >90   AST      0 - 45 U/L 16   ALT      0 - 50 U/L 15       A/P:  Ms. Ana Lilia Arrington is a 37 year old  at 38w0d by embryo transfer presenting with headache. BP in triage wnl. Pre-E labs wnl. UPC 0.06. Headache improved with 1g of tylenol. Reactive and reassuring FHT. Ready for discharge.     Discussed with Dr. Mode Griffin MD  OB/GYN PGY-3  22 4:59 PM

## 2022-05-22 NOTE — DISCHARGE INSTRUCTIONS
Discharge Instruction for Undelivered Patients      You were seen for:  pre eclampsia eval  We Consulted: Dr. Griffin and Dr. Coello  You had (Test or Medicine):fetal monitoring, labs     Diet:   Drink 8 to 12 glasses of liquids (milk, juice, water) every day.  You may eat meals and snacks.     Activity:  Count fetal kicks everyday (see handout)     Call your provider if you notice:  Swelling in your face or increased swelling in your hands or legs.  Headaches that are not relieved by Tylenol (acetaminophen).  Changes in your vision (blurring: seeing spots or stars.)  Nausea (sick to your stomach) and vomiting (throwing up).   Weight gain of 5 pounds or more per week.  Heartburn that doesn't go away.  Signs of bladder infection: pain when you urinate (use the toilet), need to go more often and more urgently.  The bag of roberson (rupture of membranes) breaks, or you notice leaking in your underwear.  Bright red blood in your underwear.  Abdominal (lower belly) or stomach pain.  Second (plus) baby: Contractions (tightening) less than 10 minutes apart and getting stronger.  Increase or change in vaginal discharge (note the color and amount)    Follow-up:  As scheduled in the clinic

## 2022-05-25 ENCOUNTER — PRENATAL OFFICE VISIT (OUTPATIENT)
Dept: OBGYN | Facility: CLINIC | Age: 37
End: 2022-05-25
Payer: COMMERCIAL

## 2022-05-25 VITALS
SYSTOLIC BLOOD PRESSURE: 138 MMHG | BODY MASS INDEX: 34.46 KG/M2 | DIASTOLIC BLOOD PRESSURE: 82 MMHG | TEMPERATURE: 96.4 F | HEART RATE: 83 BPM | WEIGHT: 213.5 LBS

## 2022-05-25 DIAGNOSIS — O09.523 MULTIGRAVIDA OF ADVANCED MATERNAL AGE IN THIRD TRIMESTER: Primary | ICD-10-CM

## 2022-05-25 PROCEDURE — 99207 PR PRENATAL VISIT: CPT | Performed by: OBSTETRICS & GYNECOLOGY

## 2022-05-25 ASSESSMENT — ANXIETY QUESTIONNAIRES
7. FEELING AFRAID AS IF SOMETHING AWFUL MIGHT HAPPEN: NOT AT ALL
3. WORRYING TOO MUCH ABOUT DIFFERENT THINGS: NOT AT ALL
IF YOU CHECKED OFF ANY PROBLEMS ON THIS QUESTIONNAIRE, HOW DIFFICULT HAVE THESE PROBLEMS MADE IT FOR YOU TO DO YOUR WORK, TAKE CARE OF THINGS AT HOME, OR GET ALONG WITH OTHER PEOPLE: NOT DIFFICULT AT ALL
2. NOT BEING ABLE TO STOP OR CONTROL WORRYING: NOT AT ALL
1. FEELING NERVOUS, ANXIOUS, OR ON EDGE: NOT AT ALL
GAD7 TOTAL SCORE: 2
GAD7 TOTAL SCORE: 2
6. BECOMING EASILY ANNOYED OR IRRITABLE: NOT AT ALL
5. BEING SO RESTLESS THAT IT IS HARD TO SIT STILL: NOT AT ALL

## 2022-05-25 ASSESSMENT — PATIENT HEALTH QUESTIONNAIRE - PHQ9
SUM OF ALL RESPONSES TO PHQ QUESTIONS 1-9: 4
5. POOR APPETITE OR OVEREATING: MORE THAN HALF THE DAYS

## 2022-05-25 NOTE — PROGRESS NOTES
38w3d overall feeling ok, no c/o.  Good fm.  Intended mother with her today and reports she has a SW who has filled out paperwork with the hospital.  I let them know I would touch base with our team and make sure everything that needs to be done, is.   Labor instructions and kick counts reviewed. RTC weekly  davin

## 2022-05-31 NOTE — PROGRESS NOTES
39w3d  Presents today with intended mom.  Getting increasingly uncomfortable and has periods of cramping/contractions, but nothing consistent.  No vaginal bleeding or leakage of fluid.  Had HA recently, but it resolved.  BP normal today, so no evidence of pre-eclampsia.  Given discomforts and logistics of delivery with intended parents, wondering about IOL.  Very reasonable given AMA, IVF pregnancy and BMI >30, as well as multiparous status and cervical exam today.  Scheduled for tomorrow, but aware may be delayed if more medically indicated procedures arise.  Level V IOL.  COVID test prior to leaving clinic today  RTC 1w.  Marybel Monroe MD

## 2022-06-01 ENCOUNTER — PRENATAL OFFICE VISIT (OUTPATIENT)
Dept: OBGYN | Facility: CLINIC | Age: 37
End: 2022-06-01
Payer: COMMERCIAL

## 2022-06-01 ENCOUNTER — CARE COORDINATION (OUTPATIENT)
Dept: CARE COORDINATION | Facility: CLINIC | Age: 37
End: 2022-06-01

## 2022-06-01 VITALS
DIASTOLIC BLOOD PRESSURE: 83 MMHG | OXYGEN SATURATION: 97 % | SYSTOLIC BLOOD PRESSURE: 129 MMHG | HEART RATE: 91 BPM | BODY MASS INDEX: 34.22 KG/M2 | WEIGHT: 212 LBS

## 2022-06-01 DIAGNOSIS — O09.523 MULTIGRAVIDA OF ADVANCED MATERNAL AGE IN THIRD TRIMESTER: Primary | ICD-10-CM

## 2022-06-01 DIAGNOSIS — O09.812 PREGNANCY RESULTING FROM IN VITRO FERTILIZATION IN SECOND TRIMESTER: ICD-10-CM

## 2022-06-01 DIAGNOSIS — Z11.59 ENCOUNTER FOR SCREENING FOR VIRAL DISEASE: ICD-10-CM

## 2022-06-01 LAB — SARS-COV-2 RNA RESP QL NAA+PROBE: NEGATIVE

## 2022-06-01 PROCEDURE — 99207 PR PRENATAL VISIT: CPT | Performed by: OBSTETRICS & GYNECOLOGY

## 2022-06-01 PROCEDURE — U0003 INFECTIOUS AGENT DETECTION BY NUCLEIC ACID (DNA OR RNA); SEVERE ACUTE RESPIRATORY SYNDROME CORONAVIRUS 2 (SARS-COV-2) (CORONAVIRUS DISEASE [COVID-19]), AMPLIFIED PROBE TECHNIQUE, MAKING USE OF HIGH THROUGHPUT TECHNOLOGIES AS DESCRIBED BY CMS-2020-01-R: HCPCS | Performed by: OBSTETRICS & GYNECOLOGY

## 2022-06-01 PROCEDURE — U0005 INFEC AGEN DETEC AMPLI PROBE: HCPCS | Performed by: OBSTETRICS & GYNECOLOGY

## 2022-06-01 NOTE — PROGRESS NOTES
This writer has been in contact with Intended parents Farhad and Reina since February to assist in planning for their child's birth.  Intended mom provided Iris with this writer's name and contact information but we have not corresponded and Iris did not have needs or questions during her pregnancy.    This writer is available Thursday and Friday to meet with Iris in L & D or [facility].  Carmeladouglas Shoemaker is aware of the IOL on Thursday and if available both Ana Lilia and her  and Reina and Farhad will have separate rooms on [facility].      Farhad and Reina have made private arrangements to stay at the [facility] as needed but would prefer to room in with baby if possible.    Sarah SRIVASTAVAW, MSW, LICSW  Maternal Child Health

## 2022-06-02 ENCOUNTER — HOSPITAL ENCOUNTER (INPATIENT)
Facility: CLINIC | Age: 37
LOS: 2 days | Discharge: HOME HEALTH | End: 2022-06-04
Attending: OBSTETRICS & GYNECOLOGY | Admitting: OBSTETRICS & GYNECOLOGY
Payer: COMMERCIAL

## 2022-06-02 ENCOUNTER — ANESTHESIA EVENT (OUTPATIENT)
Dept: OBGYN | Facility: CLINIC | Age: 37
End: 2022-06-02
Payer: COMMERCIAL

## 2022-06-02 ENCOUNTER — ANESTHESIA (OUTPATIENT)
Dept: OBGYN | Facility: CLINIC | Age: 37
End: 2022-06-02
Payer: COMMERCIAL

## 2022-06-02 LAB
ABO/RH(D): NORMAL
ANTIBODY SCREEN: NEGATIVE
CRYSTALS AMN MICRO: NORMAL
HGB BLD-MCNC: 12.6 G/DL (ref 11.7–15.7)
PLATELET # BLD AUTO: 226 10E3/UL (ref 150–450)
RUPTURE OF FETAL MEMBRANES BY ROM PLUS: NEGATIVE
SPECIMEN EXPIRATION DATE: NORMAL
T PALLIDUM AB SER QL: NONREACTIVE

## 2022-06-02 PROCEDURE — 250N000011 HC RX IP 250 OP 636: Performed by: ANESTHESIOLOGY

## 2022-06-02 PROCEDURE — 85018 HEMOGLOBIN: CPT | Performed by: STUDENT IN AN ORGANIZED HEALTH CARE EDUCATION/TRAINING PROGRAM

## 2022-06-02 PROCEDURE — 250N000009 HC RX 250: Performed by: STUDENT IN AN ORGANIZED HEALTH CARE EDUCATION/TRAINING PROGRAM

## 2022-06-02 PROCEDURE — 120N000002 HC R&B MED SURG/OB UMMC

## 2022-06-02 PROCEDURE — 3E0R3BZ INTRODUCTION OF ANESTHETIC AGENT INTO SPINAL CANAL, PERCUTANEOUS APPROACH: ICD-10-PCS | Performed by: ANESTHESIOLOGY

## 2022-06-02 PROCEDURE — 258N000003 HC RX IP 258 OP 636: Performed by: STUDENT IN AN ORGANIZED HEALTH CARE EDUCATION/TRAINING PROGRAM

## 2022-06-02 PROCEDURE — 85049 AUTOMATED PLATELET COUNT: CPT | Performed by: STUDENT IN AN ORGANIZED HEALTH CARE EDUCATION/TRAINING PROGRAM

## 2022-06-02 PROCEDURE — 86780 TREPONEMA PALLIDUM: CPT | Performed by: STUDENT IN AN ORGANIZED HEALTH CARE EDUCATION/TRAINING PROGRAM

## 2022-06-02 PROCEDURE — 86901 BLOOD TYPING SEROLOGIC RH(D): CPT | Performed by: STUDENT IN AN ORGANIZED HEALTH CARE EDUCATION/TRAINING PROGRAM

## 2022-06-02 PROCEDURE — 86850 RBC ANTIBODY SCREEN: CPT | Performed by: STUDENT IN AN ORGANIZED HEALTH CARE EDUCATION/TRAINING PROGRAM

## 2022-06-02 PROCEDURE — 250N000011 HC RX IP 250 OP 636: Performed by: STUDENT IN AN ORGANIZED HEALTH CARE EDUCATION/TRAINING PROGRAM

## 2022-06-02 PROCEDURE — 258N000003 HC RX IP 258 OP 636: Performed by: ANESTHESIOLOGY

## 2022-06-02 PROCEDURE — 00HU33Z INSERTION OF INFUSION DEVICE INTO SPINAL CANAL, PERCUTANEOUS APPROACH: ICD-10-PCS | Performed by: ANESTHESIOLOGY

## 2022-06-02 PROCEDURE — 84112 EVAL AMNIOTIC FLUID PROTEIN: CPT | Performed by: STUDENT IN AN ORGANIZED HEALTH CARE EDUCATION/TRAINING PROGRAM

## 2022-06-02 PROCEDURE — 370N000003 HC ANESTHESIA WARD SERVICE

## 2022-06-02 RX ORDER — CITRIC ACID/SODIUM CITRATE 334-500MG
30 SOLUTION, ORAL ORAL
Status: DISCONTINUED | OUTPATIENT
Start: 2022-06-02 | End: 2022-06-03 | Stop reason: HOSPADM

## 2022-06-02 RX ORDER — LIDOCAINE 40 MG/G
CREAM TOPICAL
Status: DISCONTINUED | OUTPATIENT
Start: 2022-06-02 | End: 2022-06-03 | Stop reason: HOSPADM

## 2022-06-02 RX ORDER — PROCHLORPERAZINE MALEATE 10 MG
10 TABLET ORAL EVERY 6 HOURS PRN
Status: DISCONTINUED | OUTPATIENT
Start: 2022-06-02 | End: 2022-06-03 | Stop reason: HOSPADM

## 2022-06-02 RX ORDER — PROCHLORPERAZINE 25 MG
25 SUPPOSITORY, RECTAL RECTAL EVERY 12 HOURS PRN
Status: DISCONTINUED | OUTPATIENT
Start: 2022-06-02 | End: 2022-06-03 | Stop reason: HOSPADM

## 2022-06-02 RX ORDER — PENICILLIN G 3000000 [IU]/50ML
3 INJECTION, SOLUTION INTRAVENOUS EVERY 4 HOURS
Status: DISCONTINUED | OUTPATIENT
Start: 2022-06-02 | End: 2022-06-03 | Stop reason: HOSPADM

## 2022-06-02 RX ORDER — FENTANYL CITRATE 50 UG/ML
50 INJECTION, SOLUTION INTRAMUSCULAR; INTRAVENOUS EVERY 30 MIN PRN
Status: DISCONTINUED | OUTPATIENT
Start: 2022-06-02 | End: 2022-06-03 | Stop reason: HOSPADM

## 2022-06-02 RX ORDER — OXYTOCIN 10 [USP'U]/ML
10 INJECTION, SOLUTION INTRAMUSCULAR; INTRAVENOUS
Status: DISCONTINUED | OUTPATIENT
Start: 2022-06-02 | End: 2022-06-03

## 2022-06-02 RX ORDER — ACETAMINOPHEN 325 MG/1
650 TABLET ORAL EVERY 4 HOURS PRN
Status: DISCONTINUED | OUTPATIENT
Start: 2022-06-02 | End: 2022-06-03 | Stop reason: HOSPADM

## 2022-06-02 RX ORDER — KETOROLAC TROMETHAMINE 30 MG/ML
30 INJECTION, SOLUTION INTRAMUSCULAR; INTRAVENOUS
Status: DISCONTINUED | OUTPATIENT
Start: 2022-06-02 | End: 2022-06-03

## 2022-06-02 RX ORDER — ONDANSETRON 4 MG/1
4 TABLET, ORALLY DISINTEGRATING ORAL EVERY 6 HOURS PRN
Status: DISCONTINUED | OUTPATIENT
Start: 2022-06-02 | End: 2022-06-03 | Stop reason: HOSPADM

## 2022-06-02 RX ORDER — CARBOPROST TROMETHAMINE 250 UG/ML
250 INJECTION, SOLUTION INTRAMUSCULAR
Status: DISCONTINUED | OUTPATIENT
Start: 2022-06-02 | End: 2022-06-03 | Stop reason: HOSPADM

## 2022-06-02 RX ORDER — MISOPROSTOL 200 UG/1
400 TABLET ORAL
Status: DISCONTINUED | OUTPATIENT
Start: 2022-06-02 | End: 2022-06-03 | Stop reason: HOSPADM

## 2022-06-02 RX ORDER — SODIUM CHLORIDE, SODIUM LACTATE, POTASSIUM CHLORIDE, CALCIUM CHLORIDE 600; 310; 30; 20 MG/100ML; MG/100ML; MG/100ML; MG/100ML
INJECTION, SOLUTION INTRAVENOUS CONTINUOUS PRN
Status: DISCONTINUED | OUTPATIENT
Start: 2022-06-02 | End: 2022-06-03 | Stop reason: HOSPADM

## 2022-06-02 RX ORDER — NALOXONE HYDROCHLORIDE 0.4 MG/ML
0.2 INJECTION, SOLUTION INTRAMUSCULAR; INTRAVENOUS; SUBCUTANEOUS
Status: DISCONTINUED | OUTPATIENT
Start: 2022-06-02 | End: 2022-06-03 | Stop reason: HOSPADM

## 2022-06-02 RX ORDER — METOCLOPRAMIDE 10 MG/1
10 TABLET ORAL EVERY 6 HOURS PRN
Status: DISCONTINUED | OUTPATIENT
Start: 2022-06-02 | End: 2022-06-03 | Stop reason: HOSPADM

## 2022-06-02 RX ORDER — FENTANYL/ROPIVACAINE/NS/PF 2MCG/ML-.1
PLASTIC BAG, INJECTION (ML) EPIDURAL
Status: DISCONTINUED | OUTPATIENT
Start: 2022-06-02 | End: 2022-06-03 | Stop reason: HOSPADM

## 2022-06-02 RX ORDER — METOCLOPRAMIDE HYDROCHLORIDE 5 MG/ML
10 INJECTION INTRAMUSCULAR; INTRAVENOUS EVERY 6 HOURS PRN
Status: DISCONTINUED | OUTPATIENT
Start: 2022-06-02 | End: 2022-06-03 | Stop reason: HOSPADM

## 2022-06-02 RX ORDER — NALOXONE HYDROCHLORIDE 0.4 MG/ML
0.4 INJECTION, SOLUTION INTRAMUSCULAR; INTRAVENOUS; SUBCUTANEOUS
Status: DISCONTINUED | OUTPATIENT
Start: 2022-06-02 | End: 2022-06-03 | Stop reason: HOSPADM

## 2022-06-02 RX ORDER — ONDANSETRON 2 MG/ML
4 INJECTION INTRAMUSCULAR; INTRAVENOUS EVERY 6 HOURS PRN
Status: DISCONTINUED | OUTPATIENT
Start: 2022-06-02 | End: 2022-06-03 | Stop reason: HOSPADM

## 2022-06-02 RX ORDER — MISOPROSTOL 200 UG/1
800 TABLET ORAL
Status: DISCONTINUED | OUTPATIENT
Start: 2022-06-02 | End: 2022-06-03 | Stop reason: HOSPADM

## 2022-06-02 RX ORDER — SODIUM CHLORIDE, SODIUM LACTATE, POTASSIUM CHLORIDE, CALCIUM CHLORIDE 600; 310; 30; 20 MG/100ML; MG/100ML; MG/100ML; MG/100ML
INJECTION, SOLUTION INTRAVENOUS CONTINUOUS
Status: DISCONTINUED | OUTPATIENT
Start: 2022-06-02 | End: 2022-06-03 | Stop reason: HOSPADM

## 2022-06-02 RX ORDER — OXYTOCIN/0.9 % SODIUM CHLORIDE 30/500 ML
1-30 PLASTIC BAG, INJECTION (ML) INTRAVENOUS CONTINUOUS
Status: DISCONTINUED | OUTPATIENT
Start: 2022-06-02 | End: 2022-06-03 | Stop reason: HOSPADM

## 2022-06-02 RX ORDER — NALBUPHINE HYDROCHLORIDE 20 MG/ML
2.5-5 INJECTION, SOLUTION INTRAMUSCULAR; INTRAVENOUS; SUBCUTANEOUS EVERY 6 HOURS PRN
Status: DISCONTINUED | OUTPATIENT
Start: 2022-06-02 | End: 2022-06-03

## 2022-06-02 RX ORDER — OXYTOCIN/0.9 % SODIUM CHLORIDE 30/500 ML
100-340 PLASTIC BAG, INJECTION (ML) INTRAVENOUS CONTINUOUS PRN
Status: DISCONTINUED | OUTPATIENT
Start: 2022-06-02 | End: 2022-06-03

## 2022-06-02 RX ORDER — PENICILLIN G POTASSIUM 5000000 [IU]/1
5 INJECTION, POWDER, FOR SOLUTION INTRAMUSCULAR; INTRAVENOUS ONCE
Status: COMPLETED | OUTPATIENT
Start: 2022-06-02 | End: 2022-06-02

## 2022-06-02 RX ORDER — TRANEXAMIC ACID 10 MG/ML
1 INJECTION, SOLUTION INTRAVENOUS EVERY 30 MIN PRN
Status: DISCONTINUED | OUTPATIENT
Start: 2022-06-02 | End: 2022-06-03 | Stop reason: HOSPADM

## 2022-06-02 RX ORDER — IBUPROFEN 800 MG/1
800 TABLET, FILM COATED ORAL
Status: DISCONTINUED | OUTPATIENT
Start: 2022-06-02 | End: 2022-06-03

## 2022-06-02 RX ORDER — OXYTOCIN 10 [USP'U]/ML
10 INJECTION, SOLUTION INTRAMUSCULAR; INTRAVENOUS
Status: DISCONTINUED | OUTPATIENT
Start: 2022-06-02 | End: 2022-06-03 | Stop reason: HOSPADM

## 2022-06-02 RX ORDER — METHYLERGONOVINE MALEATE 0.2 MG/ML
200 INJECTION INTRAVENOUS
Status: DISCONTINUED | OUTPATIENT
Start: 2022-06-02 | End: 2022-06-03 | Stop reason: HOSPADM

## 2022-06-02 RX ORDER — OXYTOCIN/0.9 % SODIUM CHLORIDE 30/500 ML
340 PLASTIC BAG, INJECTION (ML) INTRAVENOUS CONTINUOUS PRN
Status: DISCONTINUED | OUTPATIENT
Start: 2022-06-02 | End: 2022-06-03 | Stop reason: HOSPADM

## 2022-06-02 RX ORDER — TERBUTALINE SULFATE 1 MG/ML
0.25 INJECTION, SOLUTION SUBCUTANEOUS
Status: DISCONTINUED | OUTPATIENT
Start: 2022-06-02 | End: 2022-06-03 | Stop reason: HOSPADM

## 2022-06-02 RX ORDER — FENTANYL CITRATE-0.9 % NACL/PF 10 MCG/ML
100 PLASTIC BAG, INJECTION (ML) INTRAVENOUS EVERY 5 MIN PRN
Status: DISCONTINUED | OUTPATIENT
Start: 2022-06-02 | End: 2022-06-03 | Stop reason: HOSPADM

## 2022-06-02 RX ADMIN — Medication 16 MILLI-UNITS/MIN: at 19:38

## 2022-06-02 RX ADMIN — PENICILLIN G 3 MILLION UNITS: 3000000 INJECTION, SOLUTION INTRAVENOUS at 21:13

## 2022-06-02 RX ADMIN — Medication 2 MILLI-UNITS/MIN: at 15:13

## 2022-06-02 RX ADMIN — BUPIVACAINE HYDROCHLORIDE 10 ML: 2.5 INJECTION, SOLUTION EPIDURAL; INFILTRATION; INTRACAUDAL at 20:00

## 2022-06-02 RX ADMIN — PENICILLIN G POTASSIUM 5 MILLION UNITS: 5000000 POWDER, FOR SOLUTION INTRAMUSCULAR; INTRAPLEURAL; INTRATHECAL; INTRAVENOUS at 13:04

## 2022-06-02 RX ADMIN — Medication 18 MILLI-UNITS/MIN: at 20:47

## 2022-06-02 RX ADMIN — SODIUM CHLORIDE, POTASSIUM CHLORIDE, SODIUM LACTATE AND CALCIUM CHLORIDE: 600; 310; 30; 20 INJECTION, SOLUTION INTRAVENOUS at 23:50

## 2022-06-02 RX ADMIN — PENICILLIN G 3 MILLION UNITS: 3000000 INJECTION, SOLUTION INTRAVENOUS at 17:14

## 2022-06-02 ASSESSMENT — ACTIVITIES OF DAILY LIVING (ADL)
ADLS_ACUITY_SCORE: 18

## 2022-06-02 NOTE — ANESTHESIA PREPROCEDURE EVALUATION
Anesthesia Pre-Procedure Evaluation    Patient: Ana Lilia Arrington   MRN: [id number] : 1985        Procedure : * No procedures listed *          Past Medical History:   Diagnosis Date     Carrier of group B Streptococcus      Hypertension     only during pregnancy     Migraine      Postpartum depression      Varicella       Past Surgical History:   Procedure Laterality Date     NO HISTORY OF SURGERY       OPERATIVE HYSTEROSCOPY WITH MORCELLATOR N/A 2021    Procedure: Hysteroscopy with morcellation;  Surgeon: Marybel Monroe MD;  Location: [facility] OR      Allergies   Allergen Reactions     Eggs      States she doesn't have allergy to eggs, eats eggs     No Known Drug Allergies       Social History     Tobacco Use     Smoking status: Never Smoker     Smokeless tobacco: Never Used   Substance Use Topics     Alcohol use: No     Comment: none with pregnancy      Wt Readings from Last 1 Encounters:   22 96.6 kg (213 lb)        Anesthesia Evaluation            ROS/MED HX  ENT/Pulmonary:  - neg pulmonary ROS     Neurologic:  - neg neurologic ROS   (+) migraines,     Cardiovascular:     (+) hypertension--PIH ---    METS/Exercise Tolerance:     Hematologic:  - neg hematologic  ROS     Musculoskeletal:       GI/Hepatic:       Renal/Genitourinary:       Endo:  - neg endo ROS     Psychiatric/Substance Use:     (+) psychiatric history depression     Infectious Disease:       Malignancy:       Other: Comment:  at 39w4d           Physical Exam    Airway        Mallampati: II   TM distance: > 3 FB   Neck ROM: full   Mouth opening: > 3 cm    Respiratory Devices and Support         Dental  no notable dental history         Cardiovascular   cardiovascular exam normal          Pulmonary   pulmonary exam normal                OUTSIDE LABS:  CBC:   Lab Results   Component Value Date    WBC 9.0 2022    WBC 8.5 2021    HGB 12.6 2022    HGB 12.9 2022    HCT 40.5 2022    HCT 38.4 2021      06/02/2022     05/22/2022     BMP:   Lab Results   Component Value Date     06/09/2018     06/01/2015    POTASSIUM 4.2 06/09/2018    POTASSIUM 3.7 06/01/2015    CHLORIDE 105 06/09/2018    CHLORIDE 104 06/01/2015    CO2 29 06/09/2018    CO2 25 06/01/2015    BUN 6 (L) 06/09/2018    BUN 15 06/01/2015    CR 0.53 05/22/2022    CR 0.48 (L) 11/30/2021     (H) 07/13/2021    GLC 83 06/09/2018     COAGS: No results found for: PTT, INR, FIBR  POC:   Lab Results   Component Value Date    HCG Negative 07/13/2021     HEPATIC:   Lab Results   Component Value Date    ALBUMIN 2.2 (L) 06/09/2018    PROTTOTAL 6.2 (L) 06/09/2018    ALT 15 05/22/2022    AST 16 05/22/2022    ALKPHOS 114 06/09/2018    BILITOTAL 0.3 06/09/2018     OTHER:   Lab Results   Component Value Date    KATIUSKA 7.2 (L) 06/09/2018    MAG 6.0 (H) 06/09/2018    TSH 1.20 06/01/2015       Anesthesia Plan    ASA Status:  2      Anesthesia Type: Epidural.              Consents    Anesthesia Plan(s) and associated risks, benefits, and realistic alternatives discussed. Questions answered and patient/representative(s) expressed understanding.    - Discussed:     - Discussed with:  Patient         Postoperative Care            Comments:           neg OB ROS.       Antonio Obrien MD

## 2022-06-02 NOTE — H&P
[facility]  OB History and Physical      Ana Lilia Arrington MRN# [id number]   Age: 37 year old YOB: 1985     CC:  eIOL    HPI:  Ana Lilia Arrington is a 37 year old  at 39w4d by ETD, who presents for elective induction of labor. She is a gestational carrier. She reports some vaginal discharge, but denies any LOF concerning for ROM. She denies contractions or vaginal bleeding. Reports normal fetal movement.    She has a history of preeclampsia with severe features. She has experienced mild headaches that resolve with rest.  Denies history of asthma. She denies prior abdominal surgeries. She has a history of a cervical polyp removal.    ROS:   Complete 10-point ROS negative except as noted in HPI. She denies headache currently, blurry vision, chest pain, shortness of breath, RUQ pain.    Pregnancy Complications:  - H/o PreE w/ SF, HELLP nl, UPC 0.19 ()  - Gestational carrier, IVF preg  - Elevated early GCT, nl early GTT, nl 28w GTT  - H/o postpartum depression    Prenatal Labs:   Lab Results   Component Value Date    ABO A 2021    RH Pos 2021    AS Negative 2022    HEPBANG Nonreactive 2021    CHPCRT Negative 2017    GCPCRT Negative 2017    TREPAB Negative 2017    RPR Negative 2007    RUBELLAABIGG >500  Interpretation:  Positive, Immune 2010    HGB 12.9 2022    HIV Negative 2010       GBS Status: positive (22)    OB History  OB History    Para Term  AB Living   5 4 4 0 0 4   SAB IAB Ectopic Multiple Live Births   0 0 0 0 4      # Outcome Date GA Lbr Mayank/2nd Weight Sex Delivery Anes PTL Lv   5 Current            4 Term 18 39w2d 03:45 / 00:08 2.54 kg (5 lb 9.6 oz) F Vag-Spont EPI N MICHELINE      Birth Comments: gestational carrier for couple in [place]      Complications: GBS, Preeclampsia/Hypertension      Name: IVETH BE      Apgar1: 8  Apgar5: 9   3 Term 09/12/10 39w0d 06:00 3.799 kg (8 lb 6 oz) M     MICHELINE      Name: radha   2 Term 08 40w2d 03:00 3.175 kg (7 lb) F    MICHELINE      Name: Marcella   1 Term 06 40w0d 05:00 2.722 kg (6 lb) F    MICHELINE      Name: Estephanie       PMHx:   Past Medical History:   Diagnosis Date     Carrier of group B Streptococcus      Hypertension     only during pregnancy     Migraine      Postpartum depression      Varicella      PSHx:   Past Surgical History:   Procedure Laterality Date     NO HISTORY OF SURGERY       OPERATIVE HYSTEROSCOPY WITH MORCELLATOR N/A 2021    Procedure: Hysteroscopy with morcellation;  Surgeon: Marybel Monroe MD;  Location: [facility] OR     Meds:   No medications prior to admission.     Allergies:    Allergies   Allergen Reactions     Eggs      States she doesn't have allergy to eggs, eats eggs     No Known Drug Allergies       FmHx:   Family History   Problem Relation Age of Onset     Family History Negative No family hx of      SocHx:  She denies any tobacco, alcohol, or other drug use during this pregnancy.    PE:  Vit: No data found.   Gen: Well-appearing, NAD, comfortable  CV: Well perfused, regular rate  Pulm: Breathing comfortably  Abd: Soft, gravid, non-tender  Ext: no LE edema b/l    Pres:  cephalic by BSUS  Memb: intact              FHT: Baseline 140, mod variability, accelerations present, no decelerations   [place]: quiet      Assessment/Plan:  Ana Lilia Arrington is a 37 year old , at 39w4d by ETD, who presents for elective induction of labor.    Induction of labor  Gestational carrier  - Admit to L&D  - Labor: Anticipate . Will plan to start with pitocin, 2 hours after penicillin is in. Will AROM after epidural is placed.  - FWB: Category 1 FHT.  Continue EFM and toco  - Pain: Desires epidural for analgesia  - PNC: Rh pos, Rubella imm, GBS pos - penicillin started on arriva, early GCT elev/nl early GTT/nl 28w GTT, Placenta posterior  - Fen/GI: Reg diet, IVF    History of preeclampsia with severe features  - Baseline HELLP  labs nl, UPC 0.19 (5/22)  - Monitor blood pressures    The patient was discussed with Dr. Monroe who is in agreement with the treatment plan.    Chey Osorio MD  Obstetrics & Gynecology, PGY-2  06/01/2022 10:58 PM

## 2022-06-02 NOTE — PROGRESS NOTES
"   LUIS F MD LABOR & DELIVERY PROGRESS NOTE:   2022 1700 (delayed entry)         SUBJECTIVE:   Patient reports cramping/contractions, but not too painful or frequent.           OBJECTIVE:     Vitals:    22 1208 22 1209 22 1520 22 1645   BP:  119/69     BP Location:  Right arm     Patient Position:  Semi-Maravilla's     Cuff Size:  Adult Large     Pulse:    66   Resp:  18 24 20   Temp:  98.8  F (37.1  C) 99.1  F (37.3  C)    TempSrc:  Oral Oral    Weight: 96.6 kg (213 lb)      Height: 1.676 m (5' 6\")            NST:  reactive  FHT:  130/mod/+ accels, no decels  Cat:   1  [place]:  q 3-7 min, but not tracing well  SVE:  deferred             ASSESSMENT / PLAN:   37 year old  at 39w4d admitted for IOL.    Complications:  IVF pregnancy  AMA  Obesity  Gestational carrier    Labor: cont pitocin.  AROM when contractions more frequent/painful.  Iris would like to consider it when partha painfully q 5min.  Would plan to place epidural prior to AROM.  Fetal well being: Category 1 tracing.  GBS: pos, adequately treated  Pain: epidural at patient request.  Anesthesia resident in to see patient as I was stepping out.    Marybel Monroe MD        "

## 2022-06-02 NOTE — PROGRESS NOTES
Fetal Heart Tracing Review    Objective:   Patient Vitals for the past 4 hrs:   Temp Temp src Pulse Resp   22 1645 -- -- 66 20   22 1520 99.1  F (37.3  C) Oral -- 24       FHT: Baseline 135, mod variability, accelerations present, no decelerations  [place]: 2-3 contractions in 10 minutes    Assessment/Plan:  Ana Lilia Arrington is a 37 year old  at 39w4d by ETD, here for induction of labor. Patient is a gestational carrier.    Labor:  - Continue to titrate with pitocin.  - Pain management: Desires epidural for analgesia prior to AROM  - FWB: Cat 1 reactive and reassuring, reactive and reassuring. Continue EFM and toco.    Chey Osorio MD  Obstetrics & Gynecology, PGY-2  2022 5:25 PM

## 2022-06-02 NOTE — PLAN OF CARE
Goal Outcome Evaluation:    Plan of Care Reviewed With: patient, spouse     Overall Patient Progress: no change Category 1 EFM tracing. Pitocin up to 14mu/hr. Patient reports occasional cramping. She has had anesthesia consult and signed consent for epidural which she plans to request when she has regular contractions ~5 minutes apart. Agreeable to AROM after that. Intended parents have been present part of the shift. Supportive and excited. Continue plan of care. Anticipate

## 2022-06-02 NOTE — PROGRESS NOTES
Dr Monroe at bedside, review patient status. Plan of care. Patient not ready for AROM, patient desires epidural prior to AROM. Anesthesi will consent and consult patient soon

## 2022-06-02 NOTE — PROGRESS NOTES
Dr Monroe notified writer, she assessed patient and made a plan.  Plan to start Pen G for +GBS, start pitocin induction at 1500, patient to get Epidural and then AROM.  She has a history of fast labors.

## 2022-06-03 PROCEDURE — 250N000011 HC RX IP 250 OP 636: Performed by: STUDENT IN AN ORGANIZED HEALTH CARE EDUCATION/TRAINING PROGRAM

## 2022-06-03 PROCEDURE — 258N000003 HC RX IP 258 OP 636: Performed by: STUDENT IN AN ORGANIZED HEALTH CARE EDUCATION/TRAINING PROGRAM

## 2022-06-03 PROCEDURE — 250N000009 HC RX 250: Performed by: OBSTETRICS & GYNECOLOGY

## 2022-06-03 PROCEDURE — 120N000002 HC R&B MED SURG/OB UMMC

## 2022-06-03 PROCEDURE — 59400 OBSTETRICAL CARE: CPT | Mod: GC | Performed by: OBSTETRICS & GYNECOLOGY

## 2022-06-03 PROCEDURE — 0KQM0ZZ REPAIR PERINEUM MUSCLE, OPEN APPROACH: ICD-10-PCS | Performed by: OBSTETRICS & GYNECOLOGY

## 2022-06-03 PROCEDURE — 250N000009 HC RX 250: Performed by: STUDENT IN AN ORGANIZED HEALTH CARE EDUCATION/TRAINING PROGRAM

## 2022-06-03 PROCEDURE — 250N000013 HC RX MED GY IP 250 OP 250 PS 637: Performed by: STUDENT IN AN ORGANIZED HEALTH CARE EDUCATION/TRAINING PROGRAM

## 2022-06-03 PROCEDURE — 722N000001 HC LABOR CARE VAGINAL DELIVERY SINGLE

## 2022-06-03 PROCEDURE — 250N000009 HC RX 250

## 2022-06-03 PROCEDURE — 10907ZC DRAINAGE OF AMNIOTIC FLUID, THERAPEUTIC FROM PRODUCTS OF CONCEPTION, VIA NATURAL OR ARTIFICIAL OPENING: ICD-10-PCS | Performed by: OBSTETRICS & GYNECOLOGY

## 2022-06-03 RX ORDER — OXYTOCIN 10 [USP'U]/ML
10 INJECTION, SOLUTION INTRAMUSCULAR; INTRAVENOUS
Status: DISCONTINUED | OUTPATIENT
Start: 2022-06-03 | End: 2022-06-04 | Stop reason: HOSPADM

## 2022-06-03 RX ORDER — CARBOPROST TROMETHAMINE 250 UG/ML
250 INJECTION, SOLUTION INTRAMUSCULAR
Status: DISCONTINUED | OUTPATIENT
Start: 2022-06-03 | End: 2022-06-04 | Stop reason: HOSPADM

## 2022-06-03 RX ORDER — IBUPROFEN 800 MG/1
800 TABLET, FILM COATED ORAL EVERY 6 HOURS PRN
Status: DISCONTINUED | OUTPATIENT
Start: 2022-06-03 | End: 2022-06-04 | Stop reason: HOSPADM

## 2022-06-03 RX ORDER — OXYTOCIN/0.9 % SODIUM CHLORIDE 30/500 ML
340 PLASTIC BAG, INJECTION (ML) INTRAVENOUS CONTINUOUS PRN
Status: DISCONTINUED | OUTPATIENT
Start: 2022-06-03 | End: 2022-06-04 | Stop reason: HOSPADM

## 2022-06-03 RX ORDER — MISOPROSTOL 200 UG/1
TABLET ORAL
Status: DISCONTINUED
Start: 2022-06-03 | End: 2022-06-03 | Stop reason: HOSPADM

## 2022-06-03 RX ORDER — METHYLERGONOVINE MALEATE 0.2 MG/ML
200 INJECTION INTRAVENOUS
Status: DISCONTINUED | OUTPATIENT
Start: 2022-06-03 | End: 2022-06-04 | Stop reason: HOSPADM

## 2022-06-03 RX ORDER — TRANEXAMIC ACID 10 MG/ML
1 INJECTION, SOLUTION INTRAVENOUS EVERY 30 MIN PRN
Status: DISCONTINUED | OUTPATIENT
Start: 2022-06-03 | End: 2022-06-04 | Stop reason: HOSPADM

## 2022-06-03 RX ORDER — DOCUSATE SODIUM 100 MG/1
100 CAPSULE, LIQUID FILLED ORAL DAILY
Status: DISCONTINUED | OUTPATIENT
Start: 2022-06-03 | End: 2022-06-04 | Stop reason: HOSPADM

## 2022-06-03 RX ORDER — MODIFIED LANOLIN
OINTMENT (GRAM) TOPICAL
Status: DISCONTINUED | OUTPATIENT
Start: 2022-06-03 | End: 2022-06-04 | Stop reason: HOSPADM

## 2022-06-03 RX ORDER — MISOPROSTOL 200 UG/1
400 TABLET ORAL
Status: DISCONTINUED | OUTPATIENT
Start: 2022-06-03 | End: 2022-06-04 | Stop reason: HOSPADM

## 2022-06-03 RX ORDER — ACETAMINOPHEN 325 MG/1
650 TABLET ORAL EVERY 4 HOURS PRN
Status: DISCONTINUED | OUTPATIENT
Start: 2022-06-03 | End: 2022-06-04 | Stop reason: HOSPADM

## 2022-06-03 RX ORDER — MISOPROSTOL 200 UG/1
800 TABLET ORAL
Status: DISCONTINUED | OUTPATIENT
Start: 2022-06-03 | End: 2022-06-04 | Stop reason: HOSPADM

## 2022-06-03 RX ORDER — BISACODYL 10 MG
10 SUPPOSITORY, RECTAL RECTAL DAILY PRN
Status: DISCONTINUED | OUTPATIENT
Start: 2022-06-03 | End: 2022-06-04 | Stop reason: HOSPADM

## 2022-06-03 RX ORDER — LIDOCAINE HYDROCHLORIDE 10 MG/ML
INJECTION, SOLUTION EPIDURAL; INFILTRATION; INTRACAUDAL; PERINEURAL
Status: DISCONTINUED
Start: 2022-06-03 | End: 2022-06-03 | Stop reason: HOSPADM

## 2022-06-03 RX ORDER — HYDROCORTISONE 25 MG/G
CREAM TOPICAL 3 TIMES DAILY PRN
Status: DISCONTINUED | OUTPATIENT
Start: 2022-06-03 | End: 2022-06-04 | Stop reason: HOSPADM

## 2022-06-03 RX ORDER — OXYTOCIN 10 [USP'U]/ML
INJECTION, SOLUTION INTRAMUSCULAR; INTRAVENOUS
Status: DISCONTINUED
Start: 2022-06-03 | End: 2022-06-03 | Stop reason: HOSPADM

## 2022-06-03 RX ORDER — OXYTOCIN/0.9 % SODIUM CHLORIDE 30/500 ML
PLASTIC BAG, INJECTION (ML) INTRAVENOUS
Status: COMPLETED
Start: 2022-06-03 | End: 2022-06-03

## 2022-06-03 RX ADMIN — IBUPROFEN 800 MG: 800 TABLET, FILM COATED ORAL at 11:15

## 2022-06-03 RX ADMIN — PENICILLIN G 3 MILLION UNITS: 3000000 INJECTION, SOLUTION INTRAVENOUS at 01:34

## 2022-06-03 RX ADMIN — SODIUM CHLORIDE, POTASSIUM CHLORIDE, SODIUM LACTATE AND CALCIUM CHLORIDE: 600; 310; 30; 20 INJECTION, SOLUTION INTRAVENOUS at 03:50

## 2022-06-03 RX ADMIN — ACETAMINOPHEN 325MG 650 MG: 325 TABLET ORAL at 23:36

## 2022-06-03 RX ADMIN — IBUPROFEN 800 MG: 800 TABLET, FILM COATED ORAL at 23:49

## 2022-06-03 RX ADMIN — MISOPROSTOL 800 MCG: 200 TABLET ORAL at 08:25

## 2022-06-03 RX ADMIN — PENICILLIN G 3 MILLION UNITS: 3000000 INJECTION, SOLUTION INTRAVENOUS at 05:25

## 2022-06-03 RX ADMIN — IBUPROFEN 800 MG: 800 TABLET, FILM COATED ORAL at 18:06

## 2022-06-03 RX ADMIN — Medication 100 ML/HR: at 09:23

## 2022-06-03 RX ADMIN — Medication 22 MILLI-UNITS/MIN: at 03:51

## 2022-06-03 RX ADMIN — ACETAMINOPHEN 325MG 650 MG: 325 TABLET ORAL at 15:34

## 2022-06-03 RX ADMIN — Medication 10 ML: at 00:55

## 2022-06-03 RX ADMIN — Medication 24 MILLI-UNITS/MIN: at 04:33

## 2022-06-03 RX ADMIN — ROPIVACAINE HYDROCHLORIDE: 2 INJECTION, SOLUTION EPIDURAL; INFILTRATION at 00:51

## 2022-06-03 RX ADMIN — Medication 100 MCG: at 03:19

## 2022-06-03 RX ADMIN — Medication 20 MILLI-UNITS/MIN: at 01:47

## 2022-06-03 RX ADMIN — DOCUSATE SODIUM 100 MG: 100 CAPSULE, LIQUID FILLED ORAL at 11:15

## 2022-06-03 RX ADMIN — SODIUM CHLORIDE, POTASSIUM CHLORIDE, SODIUM LACTATE AND CALCIUM CHLORIDE 1000 ML: 600; 310; 30; 20 INJECTION, SOLUTION INTRAVENOUS at 00:18

## 2022-06-03 RX ADMIN — ACETAMINOPHEN 325MG 650 MG: 325 TABLET ORAL at 11:15

## 2022-06-03 RX ADMIN — Medication 100 MCG: at 01:38

## 2022-06-03 ASSESSMENT — ACTIVITIES OF DAILY LIVING (ADL)
ADLS_ACUITY_SCORE: 18

## 2022-06-03 NOTE — PLAN OF CARE
Data: Ana Lilia Arrington transferred to [id number] via wheelchair at 1103. Baby transferred via crib.  Action: Receiving unit notified of transfer: Yes. Patient and family notified of room change. Report given to Estelita FARLEY at 0959. Belongings sent to receiving unit. Accompanied by Registered Nurse. Oriented patient to surroundings. Call light within reach. ID bands double-checked with receiving RN.  Response: Patient tolerated transfer and is stable.

## 2022-06-03 NOTE — DISCHARGE SUMMARY
[facility] Discharge Summary    Ana Lilia Arrington MRN# [id number]   Age: 37 year old YOB: 1985     Date of Admission:  2022  Date of Discharge:  2022    Admitting Physician:  Valentina Stevens MD  Discharge Physician:  Marybel Pisano MD    Admit Dx:   -  at 39w4d  - H/o PreE w/ SF, HELLP nl, UPC 0.19 ()  - Gestational carrier, IVF preg  - Elevated early GCT, nl early GTT, nl 28w GTT  - H/o postpartum depression    Discharge Dx:  - Same as above, now  s/p     Procedures:  - Spontaneous vaginal delivery  - Epidural analgesia    Admit HPI/Labor Course:  Ana Lilia Arrington is a 37 year old now  at 39w4d, admitted for elective induction of labor. Patient is a gestational carrier. She was started on IV pitocin and AROM'd for clear fluid. She received an epidural for pain control, progressed to complete dilation, pushed and with good maternal effort delivered a liveborn female infant on 6/3/2022 at 0816 from the WANDA  position. Shoulders delivered easily. Infant with spontaneous cry. Placed on mother's abdomen. Cord clamping delayed 1min and infant passed to intended mother. Apgars 9/9, weight 3280g. IV pitocin started. Placenta delivered with fundal massage, gentle cord traction and suprapubic countertraction; noted to be intact with 3 vessel cord. 2nd degree perineal laceration, repaired with 3-0 Vicryl. Fundus firm and lochia minimal at the end of the case. .    Please see her Admission H&P and Delivery Summary for further details.    Postpartum Course:  Her postpartum course was uncomplicated. On PPD#1, she was meeting all of her postpartum goals and deemed stable for discharge. She was voiding without difficulty, tolerating a regular diet without nausea and vomiting, her pain was well controlled on oral pain medicines and her lochia was appropriate. Her hemoglobin prior to delivery was 12.6. Her Rh status was positive, and Rhogam was not  indicated.     Discharge Medications:     Review of your medicines      CONTINUE these medicines which have NOT CHANGED      Dose / Directions   * acetaminophen 325 MG tablet  Commonly known as: TYLENOL  Used for: Indication for care in labor and delivery, antepartum      Dose: 650 mg  Take 2 tablets (650 mg) by mouth every 4 hours as needed for mild pain or fever (greater than or equal to 38  C /100.4  F (oral) or 38.5  C/ 101.4  F (core).)  Quantity: 40 tablet  Refills: 0     * acetaminophen 325 MG tablet  Commonly known as: TYLENOL  Used for: Multigravida of advanced maternal age in third trimester      Dose: 650 mg  Take 2 tablets (650 mg) by mouth every 6 hours as needed for mild pain Start after Delivery.  Refills: 0     cholecalciferol 125 MCG (5000 UT) Caps      Refills: 0     cyclobenzaprine 10 MG tablet  Commonly known as: FLEXERIL  Used for: Acute nonintractable headache, unspecified headache type      Dose: 10 mg  Take 1 tablet (10 mg) by mouth 3 times daily as needed for muscle spasms  Quantity: 10 tablet  Refills: 0     ibuprofen 200 MG tablet  Commonly known as: ADVIL/MOTRIN  Used for: Multigravida of advanced maternal age in third trimester      Dose: 600 mg  Take 3 tablets (600 mg) by mouth every 6 hours as needed for moderate pain Start after delivery  Refills: 0     PRENATAL VITAMIN PO      Refills: 0     senna-docusate 8.6-50 MG tablet  Commonly known as: SENOKOT-S/PERICOLACE  Used for: Multigravida of advanced maternal age in third trimester      Dose: 1 tablet  Take 1 tablet by mouth daily Start after delivery.  Refills: 0         * This list has 2 medication(s) that are the same as other medications prescribed for you. Read the directions carefully, and ask your doctor or other care provider to review them with you.            STOP taking    aspirin 81 MG chewable tablet  Commonly known as: ASA               Discharge/Disposition:  Ana Lilia Arrington was discharged to home in stable condition with  the following instructions/medications:  1) Call for temperature > 100.4, bright red vaginal bleeding >1 pad an hour x 2 hours, foul smelling vaginal discharge, pain not controlled by usual oral pain meds, persistent nausea and vomiting not controlled on medications  2) She desired to wait to discuss contraception.  3) She was instructed to follow-up with her primary OB in 6 weeks for a routine postpartum visit.    Nevin Ziegler MD MSc  OBGYN Resident, PGY3  June 4, 2022, 6:25 AM    Physician Attestation   I, Marybel Pisano MD, saw and evaluated this patient prior to discharge.  I discussed the patient with the resident/fellow and agree with plan of care as documented in the note.      I personally reviewed vital signs, medications, labs and exam.    I personally spent 15 minutes on discharge activities.    Marybel Pisano MD  Date of Service (when I saw the patient): 06/04/22

## 2022-06-03 NOTE — PROGRESS NOTES
[facility]  Labor Progress Note    S: Patient is feeling well. Feeling contractions every 10 minutes. Overall doing well.     O:   Vitals:    22 1209 22 1520 22 1645 22 1930   BP: 119/69   122/62   BP Location: Right arm   Right arm   Patient Position: Semi-Maravilla's   Semi-Maravilla's   Cuff Size: Adult Large   Adult Large   Pulse:   66    Resp: 18 24 20 18   Temp: 98.8  F (37.1  C) 99.1  F (37.3  C)  98.3  F (36.8  C)   TempSrc: Oral Oral  Oral   Weight:       Height:         GEN:  Alert, no acute distress  SVE: deferred    FHT: Baseline 135, moderate variability, + accelerations, no decelerations  [place]: 2-3 contractions in 10 minutes    A/P:  Ana Lilia Arrington is a 37 year old  at 39w4d, here for eIOL. She is a gestational carrier.    # IOL  - continue to titrate pitocin  - patient plans epidural for anesthesia when contractions are every 5 minutes  - will AROM after epidural  - GBS pos, adequate on PCN  - anticipate     # fetal well being  - category 1 FHT, reactive and reassuring  - continuous fetal monitoring  - intrauterine resuscitation PRN    Carlos Alberto Ferreira MD  OB/GYN PGY-2  2022 8:57 PM

## 2022-06-03 NOTE — PROGRESS NOTES
[facility]  Labor Progress Note    S: comfortable with an epidural    O:   Vitals:    22 1645 22 1930 22 2120 22 0000   BP:  122/62 124/72 126/64   BP Location:  Right arm Right arm Right arm   Patient Position:  Semi-Maravilla's Semi-Maravilla's Semi-Maravilla's   Cuff Size:  Adult Large Adult Large Adult Large   Pulse: 66      Resp: 20 18 20 20   Temp:  98.3  F (36.8  C) 98.6  F (37  C)    TempSrc:  Oral Oral Oral   Weight:       Height:         GEN:  Alert, no acute distress  SVE: 3/70/-2, AROM w/clear fluid    FHT: Baseline 135, moderate variability, + accelerations, no decelerations  [place]: 3-4 contractions in 10 minutes    A/P:  Ana Lilia Arrington is a 37 year old  at 39w5d, here for eIOL. She is a gestational carrier.    # IOL  - continue to titrate pitocin, currently at 18mU/min, continue to titrate prn  - s/p AROM  - GBS pos, adequate on PCN  - anticipate     # fetal well being  - category 1 FHT, reactive and reassuring  - continuous fetal monitoring  - intrauterine resuscitation PRN    Carlos Alberto Ferreira MD  OB/GYN PGY-2  2022 1:28 AM

## 2022-06-03 NOTE — PROGRESS NOTES
[facility]  Labor Progress Note    S: Patient is feeling well, starting to get more uncomfortable.     O:   Vitals:    22 1645 22 1930 22 2120 22 0000   BP:  122/62 124/72 126/64   BP Location:  Right arm Right arm Right arm   Patient Position:  Semi-Maravilla's Semi-Maravilla's Semi-Maravilla's   Cuff Size:  Adult Large Adult Large Adult Large   Pulse: 66      Resp: 20 18 20 20   Temp:  98.3  F (36.8  C) 98.6  F (37  C)    TempSrc:  Oral Oral Oral   Weight:       Height:         GEN:  Alert, no acute distress  SVE: deferred    FHT: Baseline 130, moderate variability, + accelerations, no decelerations  [place]: 4 contractions in 10 minutes    A/P:  Ana Lilia Arrington is a 37 year old  at 39w4d, here for eIOL. She is a gestational carrier.    # IOL  - continue to titrate pitocin, currently at 18mU/min  - plan to get epidural now, then will AROM after that  - GBS pos, adequate on PCN  - anticipate     # fetal well being  - category 1 FHT, reactive and reassuring  - continuous fetal monitoring  - intrauterine resuscitation PRN    Carlos Alberto Ferreira MD  OB/GYN PGY-2  2022 12:22 AM

## 2022-06-03 NOTE — PLAN OF CARE
Goal Outcome Evaluation:    Plan of Care Reviewed With: patient     Overall Patient Progress: improving    VSS. Fundus midline, firm, and U/1. Lochia scant. Pain adequately managed with tylenol and ibuprofen. Voiding without difficulty. Continue with plan of care.

## 2022-06-03 NOTE — PROGRESS NOTES
This writer attempted to meet with Ana Lilia but she reported she is very tired and wishes to rest.  She declines any needs at this time.  This is her second time as a gestational carrier and she has strong support from her spouse and children.  Ana Lilia has this writer's contact information should new needs arise.    After Hours/Weekend SW can be paged at [phone] if new needs arise.    Sarah SRIVASTAVAW, MSW, LICSW  Maternal Child Health

## 2022-06-03 NOTE — PROGRESS NOTES
[facility] DELIVERY PROGRESS NOTE:   Setela 3, 2022 3:46 AM         SUBJECTIVE:   Patient reports she's comfortable with epidural           OBJECTIVE:     Vitals:    22 0205 22 0210 22 0215 22 0250   BP: 104/54 99/49 104/56 93/54   BP Location:       Patient Position:       Cuff Size:       Pulse:       Resp:       Temp:       TempSrc:       SpO2: 97% 98% 98% 97%   Weight:       Height:             NST:  reactive  FHT:  130/mod/+ accels, no decels  Cat:   1  [place]:  q 3-4 min  SVE:  60/-2             ASSESSMENT / PLAN:   37 year old  at 39w5d admitted for IOL.     Complications:  IVF pregnancy  AMA  Obesity  Gestational carrier     Labor:   S/p AROM.  Cont pitocin.  Still in early labor.  Hoping things will  a bit once we get to 6cm.  Fetal well being: Category 1 tracing.  GBS: pos, adequately treated  Pain: epidural in place and working well     Marybel Monroe MD

## 2022-06-03 NOTE — ANESTHESIA PROCEDURE NOTES
Epidural catheter Procedure Note    Pre-Procedure   Staff -        Anesthesiologist:  Krysten Landa MD       Resident/Fellow: Antonio Obrien MD       Performed By: resident       Location: OB       Procedure Start/Stop Times: 6/2/2022 8:00 PM and 6/2/2022 8:10 PM       Pre-Anesthestic Checklist: patient identified, IV checked, risks and benefits discussed, informed consent, monitors and equipment checked, pre-op evaluation, at physician/surgeon's request and post-op pain management  Timeout:       Correct Patient: Yes        Correct Procedure: Yes        Correct Site: Yes        Correct Position: Yes   Procedure Documentation  Procedure: epidural catheter       Diagnosis: labor epidural       Patient Position: sitting       Skin prep: DuraPrep and Chloraprep       Local skin infiltrated with mL of 2% lidocaine.        Insertion Site: L3-4. (midline approach).       Technique: LORT saline        REUBEN at 7.5 cm.       Needle Type: ToIdentifiedy needle       Needle Gauge: 17.        Needle Length (Inches): 3.5        Catheter: 18 G.          Catheter threaded easily.         4 cm epidural space.         Threaded 11.5 cm at skin.         # of attempts: 1 and  # of redirects:  1    Assessment/Narrative         Paresthesias: Resolved.       Test dose of 3 mL lidocaine 1.5% w/ 1:200,000 epinephrine at 20:07 CDT.         Test dose negative, 3 minutes after injection, for signs of intravascular, subdural, or intrathecal injection.       Insertion/Infusion Method: LORT saline       No aspiration negative for Heme or CSF via Epidural Catheter.       Sensory Level Left: L3.       Sensory Level Right: L3.    Medication(s) Administered   0.125% bupivacaine 5 mL + fentanyl 20 mcg + NS 5 mL (Epidural) (Mixture components: bupivacaine HCl (PF) 0.25 % Soln, 5 mL; fentaNYL (PF) 100 MCG/2ML Soln, 20 mcg; sodium chloride 0.9 % Soln, 5 mL) - EPIDURAL   10 mL - 6/2/2022 8:00:00 PM  Medication Administration Time: 6/2/2022 8:00  PM

## 2022-06-03 NOTE — PROGRESS NOTES
Patient arrived to [facility] unit via wheelchair at 1100,with belongings, accompanied by spouse/ significant other, with infant transferred via bassinet to [facility]. Received report from Jacqueline COLLINS RN and checked bands. Unit and room orientation completd. Call light given; no concerns present at this time. Continue with plan of care.

## 2022-06-03 NOTE — L&D DELIVERY NOTE
OB Vaginal Delivery Note    Ana Lilia Arrington MRN# [id number]   Age: 37 year old YOB: 1985     Delivery Summary    Ana Lilia Arrington is a 37 year old now  at 39w4d, admitted for elective induction of labor. Patient is a gestational carrier. She was started on IV pitocin and AROM'd for clear fluid. She received an epidural for pain control, progressed to complete dilation, pushed and with good maternal effort delivered a liveborn female infant on 6/3/2022 at 0816 from the WANDA  position. Shoulders delivered easily. Infant with spontaneous cry. Placed on mother's abdomen. Cord clamping delayed 1min and infant passed to intended mother. Apgars 9/9, weight 3280g. IV pitocin started. Placenta delivered with fundal massage, gentle cord traction and suprapubic countertraction; noted to be intact with 3 vessel cord. 2nd degree perineal laceration, repaired with 3-0 Vicryl. Fundus firm and lochia minimal at the end of the case. .    Dr. Stevens was present and gloved for entire delivery and repair.    Chey Mendoza MD  Obstetrics & Gynecology, PGY-2  2022 2:23 PM    GA: 39w5d  GP:   Labor Complications: None   EBL:   mL  Delivery QBL:    Delivery Type: Vaginal, Spontaneous   ROM to Delivery Time: (Delivered) Hours: 6 Minutes: 51  [place] Weight: 3.28 kg (7 lb 3.7 oz)    1 Minute 5 Minute 10 Minute   Apgar Totals: 9   9        CHEY MENDOZA;ROSIO ZOEY        Murphy, Female-Iris [[id number]]    Labor Event Times    Labor onset date: 6/3/22 Onset time: 11:10 PM   Dilation complete date: 6/3/22 Complete time:  8:11 AM   Start pushing date/time: 6/3/2022 0813      Labor Length    2nd Stage (hrs): 0 (min): 5   3rd Stage (hrs): 0 (min): 2      Labor Events     labor?: No   steroids: None  Labor Type: Induction/Cervical ripening  Predominate monitoring during 1st stage: continuous electronic fetal monitoring     Antibiotics received during labor?: Yes  Reason for Antibiotics:  GBS  Antibiotics received for GBS: Penicillin  Antibiotics Given (GBS): Less than or equal to 4 hours prior to delivery     Rupture identifier: Sac 1  Rupture date/time: 6/3/22 0125   Rupture type: Artificial Rupture of Membranes  Fluid color: Clear     Induction: Oxytocin  Induction date/time: 22   Cervical ripening date/time: 22   Indications for induction: Elective     Augmentation: AROM  Indications for augmentation: Ineffective Contraction Pattern  1:1 continuous labor support provided by?: RN Labor partogram used?: no      Delivery/Placenta Date and Time    Delivery Date: 6/3/22 Delivery Time:  8:16 AM   Placenta Date/Time: 6/3/2022  8:18 AM  Oxytocin given at the time of delivery: after delivery of baby  Delivering clinician: Valentina Stevens MD   Other personnel present at delivery:  Provider Role   Chey Osorio MD Resident   Jacqueline Morris, MARTINE Delivery Nurse         Vaginal Counts     Initial count performed by 2 team members:  Two Team Members   BALBINA MORRIS RN       Needles Suture Needles Sponges (RETIRED) Instruments   Initial counts 2  5    Added to count  1     Relief counts       Final counts 2 1 5          Placed during labor Accounted for at the end of labor   FSE NA NA   IUPC NA NA   Cervidil NA NA              Final count performed by 2 team members:  Two Team Members   DR SYMONE MORRIS RN      Final count correct?: Yes     Apgars    Living status: Living   1 Minute 5 Minute 10 Minute 15 Minute 20 Minute   Skin color: 1  1       Heart rate: 2  2       Reflex irritability: 2  2       Muscle tone: 2  2       Respiratory effort: 2  2       Total: 9  9       Apgars assigned by: SHAMAR HIGGINS RN     Cord    Vessels: 3 Vessels    Cord Complications: None               Cord Blood Disposition: Lab    Gases Sent?: No    Delayed cord clamping?: Yes    Cord Clamping Delay (seconds): 31-60 seconds    Stem cell collection?: No       [place] Resuscitation    Methods:  "None  [place] Care at Delivery: Stimulate to cry, to intended parent for skin to skin   Output in Delivery Room: Voided     [place] Measurements    Weight: 7 lb 3.7 oz Length: 1' 8.5\"   Head circumference: 33 cm    Output in delivery room: Voided     Skin to Skin and Feeding Plan    Skin to skin initiation date/time: 1841    Skin to skin with: Mother  Skin to skin end date/time:     Breastfeeding initiated date/time: 6/3/2022 0843     Labor Events and Shoulder Dystocia    Fetal Tracing Prior to Delivery: Category 1  Shoulder dystocia present?: Neg     Delivery (Maternal) (Provider to Complete) ([id number])    Episiotomy: None  Perineal lacerations: 2nd Repaired?: Yes   Repair suture: 3-0 Vicryl  Genital tract inspection done: Pos     Blood Loss  Mother: Ana Lilia Arrington #[id number]   Start of Mother's Information    Delivery Blood Loss  22 2310 - 22 1423       Unable to report on blood loss         Start time is after end time. Verify documentation for labor onset date/time,  procedure start date/time, and birth date/time.        End of Mother's Information  Mother: Ana Lilia Arrington #[id number]          Delivery - Provider to Complete ([id number])    Delivering clinician: Valentina Stevens MD  Attempted Delivery Types (Choose all that apply): Spontaneous Vaginal Delivery  Delivery Type (Choose the 1 that will go to the Birth History): Vaginal, Spontaneous                   Other personnel:  Provider Role   Chey Osorio MD Resident   Jacqueline Morris, RN Delivery Nurse                Placenta    Date/Time: 6/3/2022  8:18 AM  Removal: Spontaneous  Disposition: Hospital disposal           Anesthesia    Method: Epidural  Cervical dilation at placement: 0-3                Presentation and Position    Presentation: Vertex      Anterior                 Chey Osorio MD  "

## 2022-06-04 VITALS
HEIGHT: 66 IN | DIASTOLIC BLOOD PRESSURE: 59 MMHG | RESPIRATION RATE: 16 BRPM | BODY MASS INDEX: 34.23 KG/M2 | HEART RATE: 61 BPM | OXYGEN SATURATION: 98 % | SYSTOLIC BLOOD PRESSURE: 112 MMHG | TEMPERATURE: 97.8 F | WEIGHT: 213 LBS

## 2022-06-04 PROCEDURE — 250N000013 HC RX MED GY IP 250 OP 250 PS 637: Performed by: STUDENT IN AN ORGANIZED HEALTH CARE EDUCATION/TRAINING PROGRAM

## 2022-06-04 RX ADMIN — DOCUSATE SODIUM 100 MG: 100 CAPSULE, LIQUID FILLED ORAL at 09:52

## 2022-06-04 RX ADMIN — ACETAMINOPHEN 325MG 650 MG: 325 TABLET ORAL at 09:51

## 2022-06-04 RX ADMIN — IBUPROFEN 800 MG: 800 TABLET, FILM COATED ORAL at 09:52

## 2022-06-04 ASSESSMENT — ACTIVITIES OF DAILY LIVING (ADL)
ADLS_ACUITY_SCORE: 18

## 2022-06-04 NOTE — PLAN OF CARE
Goal Outcome Evaluation:    Plan of Care Reviewed With: patient     Overall Patient Progress: improving     Vss.pt up independently within room. Voiding without difficulty. Taking IBU and Tylenol for pain control. Pt has been sleeping off and on this evening. Medicated per orders. Pt does get relief from pain meds. Continue with postpartum cares and assist as needed.

## 2022-06-04 NOTE — PLAN OF CARE
Goal Outcome Evaluation:    Plan of Care Reviewed With: patient     Overall Patient Progress: improving    Data: Vital signs within normal limits. Postpartum checks within normal limits - see flow record. Patient eating and drinking normally. Patient able to empty bladder independently and is up ambulating. No apparent signs of infection.  Laceration  healing well. Patient performing self cares and is able to care for infant.  Action: Patient medicated during the shift for pain and cramping. See MAR. Patient reassessed within 1 hour after each medication and pain was improved - patient stated she was comfortable. Patient education done about postpartum goals, timelines and expectations. See flow record.  Response: Patient has not acute concerns this shift. Offered SW support if she needed with any paperwork. Needs to complete EDS reports she will in am.  Plan: Anticipate discharge on 6/4.

## 2022-06-04 NOTE — DISCHARGE INSTRUCTIONS

## 2022-06-04 NOTE — PLAN OF CARE
Goal Outcome Evaluation:  VSS and postpartum assessments WDL.  Up ad fabien with steady gait and independent with cares.  Pain managed with tylenol and ibuprofen per MAR.  Reviewed follow-up for appointments for 2 weeks and 6 weeks postpartum.  Reviewed discharge instructions and answered all questions.  Discharged home with all belongings at 1230.

## 2022-06-04 NOTE — PROGRESS NOTES
Postpartum Progress Note  PPD#1    Subjective:  Patient is doing well. Eating, drinking, ambulating, voiding without difficulty. Passing flatus. Mood good.  Pain well controlled with current pain meds. Lochia minimal. Contraception not yet discussed.    Objective:  Patient Vitals for the past 24 hrs:   BP Temp Temp src Pulse Resp SpO2   22 2345 120/72 97.6  F (36.4  C) Oral -- 16 --   22 1806 109/56 97.8  F (36.6  C) Oral 56 18 --   22 1454 127/77 97.7  F (36.5  C) Oral 61 16 --   22 1115 113/79 97.6  F (36.4  C) Oral 66 16 98 %   22 1023 103/60 -- -- -- -- --   22 1018 98/55 -- -- -- -- --   22 0953 100/59 -- -- -- 16 --   22 0948 (!) 87/54 -- -- -- -- --   22 0933 96/50 -- -- -- 15 --   22 0918 105/55 -- -- -- 15 --   22 0903 108/56 -- -- -- 16 --   22 0848 105/51 -- -- -- 16 --   22 0750 119/59 98.5  F (36.9  C) Oral -- 16 --   22 0717 108/58 -- -- -- -- --   22 0645 104/61 -- -- -- -- 96 %       Gen: resting quietly, NAD  CV: regular rate  Resp: no increased work of breathing on room air   Abd: soft, nondistended, appropriately tender, fundus firm at 3cm below umbilicus  Ext: non-tender, trace edema    HGB  Recent Labs   Lab 22  1233   HGB 12.6     Assessment/Plan:  Ana Lilia Arrington is a now  PPD#1 s/p . Pregnancy was notable for being gestational carrier, hx preeclampsia w/ SF. Delivery uncomplicated. Her postpartum course has been uncomplicated.    # Routine postpartum care:  - PNC: Rh pos. Rubella immune.  No intervention required.  - Pain Management: Continue scheduled ibuprofen and tylenol   - Heme: 12.6 > . No s/sx excessive bleeding.  - GI: PRN bowel regimen  - Voiding spontaneously.  - Infant: with intended parents  - Birth Control: undecided    Discharge to home on PPD#1    Nevin Ziegler MD MSc  OBGYN Resident, PGY3  2022, 6:21 AM    Physician Attestation   I, Marybel Stewart  MD Norris, personally examined and evaluated this patient.  I discussed the patient with the resident/fellow and care team, and agree with the assessment and plan of care as documented in the note of 22.      I personally reviewed vital signs, medications, labs and exam.    Key findings: 37 year old  on PPD 1 s/p  doing well. Gestational carrier, not planning on pumping. Discussed tight bra, ice if milk comes in.  Meeting discharge criteria. Reviewed discharge instructions including activity restrictions, pelvic rest and follow up plan. Reviewed signs of excessive bleeding, infection, postpartum pre-eclampsia, mastitis, DVT and postpartum depression - instructed patient to call if concerned about any of these or other concerns. Patient to follow up in 6 weeks for routine postpartum visit, did not discuss contraception. All questions answered.    Marybel Pisano MD  Date of Service (when I saw the patient): 22

## 2022-07-08 ENCOUNTER — PRENATAL OFFICE VISIT (OUTPATIENT)
Dept: OBGYN | Facility: CLINIC | Age: 37
End: 2022-07-08
Payer: COMMERCIAL

## 2022-07-08 VITALS
DIASTOLIC BLOOD PRESSURE: 74 MMHG | WEIGHT: 200 LBS | OXYGEN SATURATION: 98 % | BODY MASS INDEX: 32.28 KG/M2 | HEART RATE: 91 BPM | SYSTOLIC BLOOD PRESSURE: 126 MMHG

## 2022-07-08 DIAGNOSIS — K59.00 CONSTIPATION, UNSPECIFIED CONSTIPATION TYPE: ICD-10-CM

## 2022-07-08 PROCEDURE — 99207 PR POST PARTUM EXAM: CPT | Performed by: OBSTETRICS & GYNECOLOGY

## 2022-07-08 RX ORDER — SENNA AND DOCUSATE SODIUM 50; 8.6 MG/1; MG/1
1-2 TABLET, FILM COATED ORAL DAILY
Qty: 90 TABLET | Refills: 4 | Status: SHIPPED | OUTPATIENT
Start: 2022-07-08 | End: 2023-09-07

## 2022-07-08 ASSESSMENT — ANXIETY QUESTIONNAIRES
5. BEING SO RESTLESS THAT IT IS HARD TO SIT STILL: NOT AT ALL
2. NOT BEING ABLE TO STOP OR CONTROL WORRYING: NOT AT ALL
7. FEELING AFRAID AS IF SOMETHING AWFUL MIGHT HAPPEN: NOT AT ALL
GAD7 TOTAL SCORE: 0
1. FEELING NERVOUS, ANXIOUS, OR ON EDGE: NOT AT ALL
3. WORRYING TOO MUCH ABOUT DIFFERENT THINGS: NOT AT ALL
6. BECOMING EASILY ANNOYED OR IRRITABLE: NOT AT ALL
GAD7 TOTAL SCORE: 0

## 2022-07-08 ASSESSMENT — PATIENT HEALTH QUESTIONNAIRE - PHQ9
5. POOR APPETITE OR OVEREATING: NOT AT ALL
SUM OF ALL RESPONSES TO PHQ QUESTIONS 1-9: 0

## 2022-07-08 NOTE — PROGRESS NOTES
CC: postpartum visit    SUBJECTIVE:  Ana Lilia Arrington is a 37 year old female  here for a postpartum visit.  She had a  on 6/3/22 delivering a healthy baby girl weighing 7 lbs 3.7 oz at 39w5d.      PHQ-9 SCORE 2022   PHQ-9 Total Score - - -   PHQ-9 Total Score 0 4 0     ODUGLAS-7 SCORE 10/14/2015 2022 2022   Total Score 15 2 0         delivery complications:  No  breast feeding:   N/A, she was a gestational carrier  bladder problems:  No  bowel problems/hemorrhoids:  Yes.  Constipation  episiotomy/laceration/incision healed? Yes: no concerns  vaginal flow:  None at this point  [place]:  No  contraception:  undecided  emotional adjustment:  doing well and happy  back to work:  Monday    Current Outpatient Medications   Medication     acetaminophen (TYLENOL) 325 MG tablet     cholecalciferol 125 MCG (5000 UT) CAPS     cyclobenzaprine (FLEXERIL) 10 MG tablet     Prenatal Vit-Fe Fumarate-FA (PRENATAL VITAMIN PO)     SENNA-docusate sodium (SENNA S) 8.6-50 MG tablet     No current facility-administered medications for this visit.       OBJECTIVE:  Blood pressure 126/74, pulse 91, weight 90.7 kg (200 lb), last menstrual period 2021, SpO2 98 %, unknown if currently breastfeeding.   General - pleasant female in no acute distress.  Abdomen - soft, nontender, nondistended, no hepatosplenomegaly.  Pelvic - EG: normal adult female, BUS: within normal limits, Vagina: well rugated, no discharge Adnexae: no masses or tenderness.  Rectovaginal - deferred.    ASSESSMENT:  37 year old  with normal postpartum exam    PLAN:  May resume normal activities without restrictions  Pap smear was not done today.  Due 8/3/23    Stool softener sent to pharmacy for constipation.    The patient is undecided about contraception, but she and her  are certain they don't desire any more children between the two of them.  Would consider carrying a pregnancy for a couple that they've already  worked with, but no plans at this point.  Discussed and provided info on all contraceptive options, but also discussed sterilization procedure for either of them.  FTP signed in the event she wants [facility] salpingectomy.    Discussed recommended interpregnancy interval of at least 12 months.    Full counseling was provided, and all questions answered. Compliance is strongly emphasized.    Return to clinic in one year for an annual, when due for a pap smear or PRN.    Marybel Monroe MD

## 2022-07-08 NOTE — LETTER
July 8, 2022      Ana Lilia Arrington  [address] [facility] [address]        To Whom It May Concern:    Ana Lilia Arrington was seen in our clinic. She may return to work without restrictions.      Sincerely,        Marybel Monroe MD

## 2022-08-09 NOTE — PROGRESS NOTES
POCT glucose 81.       Homa Zepeda  08/09/22 1952 Patient arrived to [facility] unit via wheelchair at 2330,with belongings, accompanied by parents of infant, with infant in arms. Received report from JYOTHI Yeh RN and checked bands. Unit and room orientation completd. Call light given; no concerns present at this time. Continue with plan of care.

## 2022-08-20 ENCOUNTER — LAB (OUTPATIENT)
Dept: URGENT CARE | Facility: URGENT CARE | Age: 37
End: 2022-08-20
Attending: FAMILY MEDICINE
Payer: COMMERCIAL

## 2022-08-20 DIAGNOSIS — Z20.822 SUSPECTED 2019 NOVEL CORONAVIRUS INFECTION: ICD-10-CM

## 2022-08-22 ENCOUNTER — LAB (OUTPATIENT)
Dept: URGENT CARE | Facility: URGENT CARE | Age: 37
End: 2022-08-22

## 2022-08-22 DIAGNOSIS — Z20.822 SUSPECTED COVID-19 VIRUS INFECTION: ICD-10-CM

## 2022-08-22 LAB — SARS-COV-2 RNA RESP QL NAA+PROBE: POSITIVE

## 2022-08-22 PROCEDURE — U0005 INFEC AGEN DETEC AMPLI PROBE: HCPCS

## 2022-08-22 PROCEDURE — U0003 INFECTIOUS AGENT DETECTION BY NUCLEIC ACID (DNA OR RNA); SEVERE ACUTE RESPIRATORY SYNDROME CORONAVIRUS 2 (SARS-COV-2) (CORONAVIRUS DISEASE [COVID-19]), AMPLIFIED PROBE TECHNIQUE, MAKING USE OF HIGH THROUGHPUT TECHNOLOGIES AS DESCRIBED BY CMS-2020-01-R: HCPCS

## 2022-10-06 NOTE — PROGRESS NOTES
34w1d  Getting increasingly uncomfortable, but no contractions, vaginal bleeding or leakage of fluid.  +FM  On doptone, FHR varied fro 120s-160.  Recommended NST to further evaluate.  NST Reactive.  130/mod/+ accels, no decels  RTC 2w.  Marybel Mnoroe MD     Britney carmichael

## 2023-04-22 ENCOUNTER — HEALTH MAINTENANCE LETTER (OUTPATIENT)
Age: 38
End: 2023-04-22

## 2023-07-24 ENCOUNTER — OFFICE VISIT (OUTPATIENT)
Dept: FAMILY MEDICINE | Facility: CLINIC | Age: 38
End: 2023-07-24
Payer: COMMERCIAL

## 2023-07-24 VITALS
HEIGHT: 64 IN | RESPIRATION RATE: 12 BRPM | TEMPERATURE: 97.7 F | OXYGEN SATURATION: 99 % | DIASTOLIC BLOOD PRESSURE: 78 MMHG | HEART RATE: 63 BPM | WEIGHT: 173 LBS | BODY MASS INDEX: 29.53 KG/M2 | SYSTOLIC BLOOD PRESSURE: 124 MMHG

## 2023-07-24 DIAGNOSIS — Z12.4 CERVICAL CANCER SCREENING: ICD-10-CM

## 2023-07-24 DIAGNOSIS — Z00.00 ENCOUNTER FOR ANNUAL PHYSICAL EXAM: Primary | ICD-10-CM

## 2023-07-24 DIAGNOSIS — M25.531 RIGHT WRIST PAIN: ICD-10-CM

## 2023-07-24 LAB
CHOLEST SERPL-MCNC: 180 MG/DL
HDLC SERPL-MCNC: 67 MG/DL
LDLC SERPL CALC-MCNC: 95 MG/DL
NONHDLC SERPL-MCNC: 113 MG/DL
TRIGL SERPL-MCNC: 91 MG/DL
TSH SERPL DL<=0.005 MIU/L-ACNC: 1.15 UIU/ML (ref 0.3–4.2)

## 2023-07-24 PROCEDURE — 80061 LIPID PANEL: CPT

## 2023-07-24 PROCEDURE — 84443 ASSAY THYROID STIM HORMONE: CPT

## 2023-07-24 PROCEDURE — 99213 OFFICE O/P EST LOW 20 MIN: CPT | Mod: 25

## 2023-07-24 PROCEDURE — 36415 COLL VENOUS BLD VENIPUNCTURE: CPT

## 2023-07-24 PROCEDURE — 99395 PREV VISIT EST AGE 18-39: CPT

## 2023-07-24 ASSESSMENT — ENCOUNTER SYMPTOMS
FEVER: 0
NAUSEA: 0
PARESTHESIAS: 0
DIARRHEA: 0
HEMATOCHEZIA: 0
WEAKNESS: 0
CONSTIPATION: 0
CHILLS: 0
ABDOMINAL PAIN: 0
HEARTBURN: 0
COUGH: 0
ARTHRALGIAS: 0
EYE PAIN: 0
DYSURIA: 0
FREQUENCY: 0
JOINT SWELLING: 0
DIZZINESS: 0
MYALGIAS: 0
HEMATURIA: 0
SHORTNESS OF BREATH: 0
PALPITATIONS: 0
BREAST MASS: 0
SORE THROAT: 0
HEADACHES: 0
NERVOUS/ANXIOUS: 0

## 2023-07-24 ASSESSMENT — PAIN SCALES - GENERAL: PAINLEVEL: MILD PAIN (2)

## 2023-07-24 NOTE — PROGRESS NOTES
SUBJECTIVE:   CC: Ana Lilia is an 38 year old who presents for preventive health visit.        No data to display              Healthy Habits:     Getting at least 3 servings of Calcium per day:  Yes    Bi-annual eye exam:  NO    Dental care twice a year:  Yes    Sleep apnea or symptoms of sleep apnea:  None    Diet:  Regular (no restrictions)    Frequency of exercise:  2-3 days/week    Duration of exercise:  15-30 minutes    Taking medications regularly:  Yes    Medication side effects:  None    Additional concerns today:  No    Right arm and wrist pain that radiates into the shoulder. This has been occurring for a few months. Tingling in the 3rd and 4th digits. 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 Also feels it present on the elbow. Sometimes will feel neck pain when the pain flares up as well. She has been using a wrist brace during sleep and work.    Occupation.  Family: Raheel- . Has three children-12, 14, 16,  Sleep: no issues  Diet: Latin food: beans, rice. Chicken, steak, tilapia/fish, shrimp.  Exercise: Walking at work/home.      MH: No depression  Today's PHQ-2 Score:       7/24/2023    10:45 AM   PHQ-2 ( 1999 [employer])   Q1: Little interest or pleasure in doing things 0   Q2: Feeling down, depressed or hopeless 0   PHQ-2 Score 0   Q1: Little interest or pleasure in doing things Not at all   Q2: Feeling down, depressed or hopeless Not at all   PHQ-2 Score 0     Have you ever done Advance Care Planning? (For example, a Health Directive, POLST, or a discussion with a medical provider or your loved ones about your wishes): No, advance care planning information given to patient to review.  Advanced care planning was discussed at today's visit.    Social History     Tobacco Use    Smoking status: Never    Smokeless tobacco: Never   Substance Use Topics    Alcohol use: No     Comment: none with pregnancy              2023    10:43 AM   Alcohol Use   Prescreen: >3 drinks/day or >7 drinks/week? No          No data to display              Reviewed orders with patient.  Reviewed health maintenance and updated orders accordingly - Yes    Breast Cancer Screenin/24/2023    10:46 AM   Breast CA Risk Assessment (FHS-7)   Do you have a family history of breast, colon, or ovarian cancer? No / Unknown       click delete button to remove this line now  Patient under 40 years of age: Routine Mammogram Screening not recommended.   Pertinent mammograms are reviewed under the imaging tab.    History of abnormal Pap smear: NO - age 30-65 PAP every 5 years with negative HPV co-testing recommended      Latest Ref Rng & Units 8/3/2018     4:30 PM 8/3/2018     2:50 PM 2015    12:00 AM   PAP / HPV   PAP (Historical)  NIL   NIL    HPV 16 DNA NEG^Negative  Negative     HPV 18 DNA NEG^Negative  Negative     Other HR HPV NEG^Negative  Negative       Reviewed and updated as needed this visit by clinical staff   Tobacco  Allergies  Meds              Reviewed and updated as needed this visit by Provider                     Review of Systems   Constitutional:  Negative for chills and fever.   HENT:  Negative for congestion, ear pain, hearing loss and sore throat.    Eyes:  Negative for pain and visual disturbance.   Respiratory:  Negative for cough and shortness of breath.    Cardiovascular:  Negative for chest pain, palpitations and peripheral edema.   Gastrointestinal:  Negative for abdominal pain, constipation, diarrhea, heartburn, hematochezia and nausea.   Breasts:  Negative for tenderness, breast mass and discharge.   Genitourinary:  Negative for dysuria, frequency, genital sores, hematuria, pelvic pain, urgency, vaginal bleeding and vaginal discharge.   Musculoskeletal:  Negative for arthralgias, joint swelling and myalgias.   Skin:  Negative for rash.   Neurological:  Negative for dizziness, weakness, headaches and  "paresthesias.   Psychiatric/Behavioral:  Negative for mood changes. The patient is not nervous/anxious.         OBJECTIVE:   /78 (BP Location: Right arm, Patient Position: Sitting, Cuff Size: Adult Regular)   Pulse 63   Temp 97.7  F (36.5  C) (Temporal)   Resp 12   Ht 1.626 m (5' 4\")   Wt 78.5 kg (173 lb)   SpO2 99%   Breastfeeding No   BMI 29.70 kg/m    Physical Exam  GENERAL: healthy, alert and no distress  EYES: Eyes grossly normal to inspection, PERRL and conjunctivae and sclerae normal  HENT: ear canals and TM's normal, nose and mouth without ulcers or lesions  NECK: no adenopathy, no asymmetry, masses, or scars and thyroid normal to palpation  RESP: lungs clear to auscultation - no rales, rhonchi or wheezes  CV: regular rate and rhythm, normal S1 S2, no S3 or S4, no murmur, click or rub, no peripheral edema and peripheral pulses strong  ABDOMEN: soft, nontender, no hepatosplenomegaly, no masses and bowel sounds normal  : Patient deferred genital exam today.  MS: Right forearm pain reported with resisted pronation.  Phalen and Tinel tests were negative.  No pain at the metacarpal phalangeal joints or base of the thumb.  No pain with resisted thumb extension or flexion.   strength 5 out of 5 bilaterally.  Triceps and brachioradialis reflexes 2+ on the right side.  SKIN: no suspicious lesions or rashes  NEURO: Normal strength and tone, mentation intact and speech normal  PSYCH: mentation appears normal, affect normal/bright      ASSESSMENT/PLAN:   (Z00.00) Encounter for annual physical exam  (primary encounter diagnosis)  Plan: Lipid panel reflex to direct LDL Non-fasting,         TSH with free T4 reflex    (Z12.4) Cervical cancer screening  Deferred due to patient preference    (M25.531) Right wrist pain  Plan: Occupational Therapy Referral  Patient may have underlying extensor tendinitis given that it radiates up to the forearm.  I think there is low likelihood for radiculopathy from the " neck downward at this time, as pain radiates from distal to proximal direction.  She also has intact reflexes that would go against radiculopathy.  She may have some underlying carpal tunnel although Tinel and Phalen tests were negative.  I recommend that she follow-up with OT for management and continue bracing.    Patient has been advised of split billing requirements and indicates understanding: Yes      COUNSELING:  Reviewed preventive health counseling, as reflected in patient instructions       Regular exercise       Healthy diet/nutrition        She reports that she has never smoked. She has never used smokeless tobacco.          Олег Patel NP  [facility]

## 2023-07-26 ENCOUNTER — THERAPY VISIT (OUTPATIENT)
Dept: OCCUPATIONAL THERAPY | Facility: CLINIC | Age: 38
End: 2023-07-26
Payer: COMMERCIAL

## 2023-07-26 DIAGNOSIS — M25.531 RIGHT WRIST PAIN: ICD-10-CM

## 2023-07-26 PROCEDURE — 97165 OT EVAL LOW COMPLEX 30 MIN: CPT | Mod: GO | Performed by: OCCUPATIONAL THERAPIST

## 2023-07-26 PROCEDURE — 97112 NEUROMUSCULAR REEDUCATION: CPT | Mod: GO | Performed by: OCCUPATIONAL THERAPIST

## 2023-07-26 NOTE — PROGRESS NOTES
OCCUPATIONAL THERAPY EVALUATION  Type of Visit: Evaluation    See electronic medical record for Abuse and Falls Screening details.    Subjective      Presenting condition or subjective complaint: no  Date of onset:      Relevant medical history:     Past Medical History:   Diagnosis Date    Carrier of group B Streptococcus     Hypertension     only during pregnancy    Migraine     Postpartum depression     Varicella      Dates & types of surgery: none    Prior diagnostic imaging/testing results:       Prior therapy history for the same diagnosis, illness or injury:        Living Environment  Social support: With a significant other or spouse   Type of home: 2-story   Stairs to enter the home:         Ramp: No   Stairs inside the home: Yes 13 Is there a railing: Yes   Help at home:    Equipment owned:       Employment: Yes   - office cleaning  Hobbies/Interests:  Watching tv, bringing teenagers to the mall    Patient goals for therapy: nothing       Objective   Right hand dominant  Patient reports symptoms of pain, weakness/loss of strength, and tingling     Pain Level (Scale 0-10)   7/26/2023   At Rest 3/10   With Use 6/10     Pain Description  Date 7/26/2023   Location wrist and long finger   Pain Quality Sharp, Shooting, and Tingling   Frequency constant     Pain is worst  nighttime   Exacerbated by  Worse at nighttime   Relieved by none   Progression Gradually worsening/unchanged     Edema  None    Sensation   Decreased Radial Nerve distribution per pt report and Decreased Dorsal Radial Sensory Nerve distribution per pt report    ROM  WNL per visual oBservation    Tenderness: Pain level report on scale 0-10/10  WRIST PALPATION:  Date 7/26/2023   Side R   Ulna Styloid -   TFCC -   Distal Radius -   Radial Styloid -   Extensor wad +   PIN site +     Resisted Testing  Pain Report:  - none    + mild    ++ moderate    +++ severe    7/26/2023   Elbow Extension 4/5 ++   Elbow Flexion 5/5 -   Supination  5/5 -    Pronation 4/5 ++   Wrist Ext with RD, Elbow at side 4/5 ++   Wrist Ext with UD, Elbow at side 4/5 ++   Wrist Ext with RD, Elbow Ext NT d/t pain with motion   Wrist Ext with UD, Elbow Ext NT d/t pain with motion   Wrist Flex with RD, Elbow at side 4/5 + pain on dorsum of wrist   Wrist Flex with UD, Elbow at side 4/5 + pain on dorsum of wrist   EDC with Elbow at side 5/5 +   EDC with Elbow Ext 5/5 -   Long Finger Test 5/5 +     Strength   (Measured in pounds)  Pain Report: - none  + mild    ++ moderate    +++ severe    7/26/2023 7/26/2023   Trials L R   1   54 56   Average 54 56     Assessment & Plan   CLINICAL IMPRESSIONS  Medical Diagnosis:      Treatment Diagnosis:      Impression/Assessment: Pt is a 38 year old female presenting to Occupational Therapy due to few months ago, unknown onset.  The following significant findings have been identified: Impaired ROM, Impaired sensation, Impaired strength, and Pain.  These identified deficits interfere with their ability to perform self care tasks, work tasks, recreational activities, household chores, and meal planning and preparation as compared to previous level of function.   Patient's limitations or Problem List includes: Pain, Weakness, Decreased , Decreased pinch, and Tightness in musculature of the right elbow which interferes with the patient's ability to perform Self Care Tasks (dressing, eating, bathing, hygiene/toileting), Work Tasks, Recreational Activities, and Household Chores as compared to previous level of function.    Clinical Decision Making (Complexity):  Assessment of Occupational Performance: 5 or more Performance Deficits  Occupational Performance Limitations: bathing/showering, toileting, dressing, feeding, functional mobility, personal device care, hygiene and grooming, health management and maintenance, home establishment and management, meal preparation and cleanup, shopping, sleep, work, and leisure activities  Clinical Decision  Making (Complexity): Low complexity    PLAN OF CARE  Treatment Interventions:  Modalities:  Hot Packs  Therapeutic Exercise:  AROM, AAROM, PROM, Tendon Gliding, Blocking, Reverse Blocking, Place and Hold, Contract Relax, Extensor Tracking, Isotonics, Isometrics and Stabilization  Neuromuscular re-education:  Nerve Gliding, Coordination/Dexterity, Sensory re-education, Desensitization, Kinesthetic Training, Proprioceptive Training, Kinesiotaping, Strain Counter Strain, Isometrics, Stabilization   Manual Techniques:  Coordination/Dexterity, Joint mobilization, Scar mobilization, Friction massage, Myofascial release, and Manual edema mobilization  Orthotic Fabrication:  Static and Forearm based  Self Care:  Self Care Tasks and Ergonomic Considerations    Long Term Goals          Frequency of Treatment:    Duration of Treatment:       Recommended Referrals to Other Professionals:   Education Assessment:       Risks and benefits of evaluation/treatment have been explained.   Patient/Family/caregiver agrees with Plan of Care.     Evaluation Time:            Signing Clinician: Barbi Herndon OT

## 2023-09-07 ENCOUNTER — OFFICE VISIT (OUTPATIENT)
Dept: FAMILY MEDICINE | Facility: CLINIC | Age: 38
End: 2023-09-07
Payer: COMMERCIAL

## 2023-09-07 VITALS
OXYGEN SATURATION: 97 % | WEIGHT: 174 LBS | BODY MASS INDEX: 29.71 KG/M2 | RESPIRATION RATE: 12 BRPM | DIASTOLIC BLOOD PRESSURE: 78 MMHG | HEIGHT: 64 IN | SYSTOLIC BLOOD PRESSURE: 108 MMHG | HEART RATE: 57 BPM

## 2023-09-07 DIAGNOSIS — Z00.00 ROUTINE HISTORY AND PHYSICAL EXAMINATION OF ADULT: Primary | ICD-10-CM

## 2023-09-07 DIAGNOSIS — Z11.3 ROUTINE SCREENING FOR STI (SEXUALLY TRANSMITTED INFECTION): ICD-10-CM

## 2023-09-07 DIAGNOSIS — Z12.4 CERVICAL CANCER SCREENING: ICD-10-CM

## 2023-09-07 DIAGNOSIS — G47.00 INSOMNIA, UNSPECIFIED TYPE: ICD-10-CM

## 2023-09-07 PROCEDURE — G0145 SCR C/V CYTO,THINLAYER,RESCR: HCPCS | Performed by: NURSE PRACTITIONER

## 2023-09-07 PROCEDURE — 86780 TREPONEMA PALLIDUM: CPT | Performed by: NURSE PRACTITIONER

## 2023-09-07 PROCEDURE — 86803 HEPATITIS C AB TEST: CPT | Performed by: NURSE PRACTITIONER

## 2023-09-07 PROCEDURE — 36415 COLL VENOUS BLD VENIPUNCTURE: CPT | Performed by: NURSE PRACTITIONER

## 2023-09-07 PROCEDURE — 87591 N.GONORRHOEAE DNA AMP PROB: CPT | Performed by: NURSE PRACTITIONER

## 2023-09-07 PROCEDURE — 99395 PREV VISIT EST AGE 18-39: CPT | Performed by: NURSE PRACTITIONER

## 2023-09-07 PROCEDURE — 87491 CHLMYD TRACH DNA AMP PROBE: CPT | Performed by: NURSE PRACTITIONER

## 2023-09-07 PROCEDURE — 87389 HIV-1 AG W/HIV-1&-2 AB AG IA: CPT | Performed by: NURSE PRACTITIONER

## 2023-09-07 PROCEDURE — 87624 HPV HI-RISK TYP POOLED RSLT: CPT | Performed by: NURSE PRACTITIONER

## 2023-09-07 RX ORDER — HYDROXYZINE HYDROCHLORIDE 25 MG/1
25 TABLET, FILM COATED ORAL
Qty: 90 TABLET | Refills: 1 | Status: SHIPPED | OUTPATIENT
Start: 2023-09-07

## 2023-09-07 ASSESSMENT — PAIN SCALES - GENERAL: PAINLEVEL: NO PAIN (0)

## 2023-09-07 NOTE — PROGRESS NOTES
"  Assessment & Plan   Problem List Items Addressed This Visit    None  Visit Diagnoses       Routine history and physical examination of adult    -  Primary    Cervical cancer screening        Relevant Orders    Pap Screen with HPV - recommended age 30 - 65 years    Routine screening for STI (sexually transmitted infection)        Relevant Orders    NEISSERIA GONORRHOEA PCR    CHLAMYDIA TRACHOMATIS PCR    **HIV Antigen Antibody Combo FUTURE 2mo    Treponema Abs w Reflex to RPR and Titer    Hepatitis C antibody    Insomnia, unspecified type        Relevant Medications    hydrOXYzine (ATARAX) 25 MG tablet           }     BMI:   Estimated body mass index is 29.87 kg/m  as calculated from the following:    Height as of this encounter: 1.626 m (5' 4\").    Weight as of this encounter: 78.9 kg (174 lb).       See Patient Instructions    SONU Ortiz CNP [facility] JARED Sung is a 38 year old, presenting for the following health issues:  Gyn Exam        9/7/2023    11:37 AM   Additional Questions   Roomed by Peter FARLEY       History of Present Illness       Reason for visit:  Pap    She eats 2-3 servings of fruits and vegetables daily.She consumes 1 sweetened beverage(s) daily.She exercises with enough effort to increase her heart rate 20 to 29 minutes per day.  She exercises with enough effort to increase her heart rate 5 days per week.   She is taking medications regularly.     Overall feeling well, and has a lot of energy. 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 She tends to have a hard time getting to sleep after she is done with work, especially as she has to get up in the morning to encourage her kids to do schoolwork. Sometimes gets a bit anxious, especially at work as she has to manage employees, which is new for her. She doesn't deal with much anxiety at home.    Review of Systems   CONSTITUTIONAL: NEGATIVE for fever, " "chills, change in weight  INTEGUMENTARY/SKIN: NEGATIVE for worrisome rashes, moles or lesions  EYES: NEGATIVE for vision changes or irritation  ENT/MOUTH: NEGATIVE for ear, mouth and throat problems  RESP: NEGATIVE for significant cough or SOB  BREAST: NEGATIVE for masses, tenderness or discharge  CV: NEGATIVE for chest pain, palpitations or peripheral edema  GI: NEGATIVE for nausea, abdominal pain, heartburn, or change in bowel habits  : NEGATIVE for frequency, dysuria, or hematuria  MUSCULOSKELETAL: NEGATIVE for significant arthralgias or myalgia  NEURO: NEGATIVE for weakness, dizziness or paresthesias  ENDOCRINE: NEGATIVE for temperature intolerance, skin/hair changes  HEME: NEGATIVE for bleeding problems  PSYCHIATRIC: NEGATIVE for changes in mood or affect      Objective    /78 (BP Location: Left arm, Patient Position: Sitting, Cuff Size: Adult Regular)   Pulse 57   Resp 12   Ht 1.626 m (5' 4\")   Wt 78.9 kg (174 lb)   LMP 09/06/2023 (Approximate)   SpO2 97%   BMI 29.87 kg/m    Body mass index is 29.87 kg/m .  Physical Exam   GENERAL: healthy, alert and no distress  EYES: Eyes grossly normal to inspection, PERRL and conjunctivae and sclerae normal  HENT: ear canals and TM's normal, nose and mouth without ulcers or lesions  NECK: no adenopathy, no asymmetry, masses, or scars and thyroid normal to palpation  RESP: lungs clear to auscultation - no rales, rhonchi or wheezes  BREAST: normal without masses, tenderness or nipple discharge and no palpable axillary masses or adenopathy  CV: regular rate and rhythm, normal S1 S2, no S3 or S4, no murmur, click or rub, no peripheral edema and peripheral pulses strong  ABDOMEN: soft, nontender, no hepatosplenomegaly, no masses and bowel sounds normal   (female): normal female external genitalia, normal urethral meatus, vaginal mucosa, normal cervix/adnexa/uterus without masses or discharge  MS: no gross musculoskeletal defects noted, no edema  NEURO: Normal " strength and tone, mentation intact and speech normal  PSYCH: mentation appears normal, affect normal/bright    Office Visit on 07/24/2023   Component Date Value Ref Range Status    Cholesterol 07/24/2023 180  <200 mg/dL Final    Triglycerides 07/24/2023 91  <150 mg/dL Final    Direct Measure HDL 07/24/2023 67  >=50 mg/dL Final    LDL Cholesterol Calculated 07/24/2023 95  <=100 mg/dL Final    Non HDL Cholesterol 07/24/2023 113  <130 mg/dL Final    TSH 07/24/2023 1.15  0.30 - 4.20 uIU/mL Final

## 2023-09-08 LAB
C TRACH DNA SPEC QL NAA+PROBE: NEGATIVE
HCV AB SERPL QL IA: NONREACTIVE
HIV 1+2 AB+HIV1 P24 AG SERPL QL IA: NONREACTIVE
N GONORRHOEA DNA SPEC QL NAA+PROBE: NEGATIVE
T PALLIDUM AB SER QL: NONREACTIVE

## 2023-09-11 LAB
BKR LAB AP GYN ADEQUACY: NORMAL
BKR LAB AP GYN INTERPRETATION: NORMAL
BKR LAB AP HPV REFLEX: NORMAL
BKR LAB AP LMP: NORMAL
BKR LAB AP PREVIOUS ABNORMAL: NORMAL
PATH REPORT.COMMENTS IMP SPEC: NORMAL
PATH REPORT.COMMENTS IMP SPEC: NORMAL
PATH REPORT.RELEVANT HX SPEC: NORMAL

## 2023-09-12 LAB
HUMAN PAPILLOMA VIRUS 16 DNA: NEGATIVE
HUMAN PAPILLOMA VIRUS 18 DNA: NEGATIVE
HUMAN PAPILLOMA VIRUS FINAL DIAGNOSIS: NORMAL
HUMAN PAPILLOMA VIRUS OTHER HR: NEGATIVE

## 2024-10-07 ENCOUNTER — OFFICE VISIT (OUTPATIENT)
Dept: FAMILY MEDICINE | Facility: CLINIC | Age: 39
End: 2024-10-07
Payer: COMMERCIAL

## 2024-10-07 VITALS
DIASTOLIC BLOOD PRESSURE: 81 MMHG | HEART RATE: 65 BPM | BODY MASS INDEX: 31.24 KG/M2 | WEIGHT: 183 LBS | TEMPERATURE: 97.7 F | RESPIRATION RATE: 14 BRPM | SYSTOLIC BLOOD PRESSURE: 125 MMHG | OXYGEN SATURATION: 100 % | HEIGHT: 64 IN

## 2024-10-07 DIAGNOSIS — Z00.00 ROUTINE HISTORY AND PHYSICAL EXAMINATION OF ADULT: Primary | ICD-10-CM

## 2024-10-07 DIAGNOSIS — Z28.39 INCOMPLETE IMMUNIZATION SERIES: ICD-10-CM

## 2024-10-07 PROCEDURE — 99395 PREV VISIT EST AGE 18-39: CPT | Performed by: NURSE PRACTITIONER

## 2024-10-07 PROCEDURE — 87340 HEPATITIS B SURFACE AG IA: CPT | Performed by: NURSE PRACTITIONER

## 2024-10-07 PROCEDURE — 86704 HEP B CORE ANTIBODY TOTAL: CPT | Performed by: NURSE PRACTITIONER

## 2024-10-07 PROCEDURE — 86706 HEP B SURFACE ANTIBODY: CPT | Performed by: NURSE PRACTITIONER

## 2024-10-07 PROCEDURE — 36415 COLL VENOUS BLD VENIPUNCTURE: CPT | Performed by: NURSE PRACTITIONER

## 2024-10-07 SDOH — HEALTH STABILITY: PHYSICAL HEALTH: ON AVERAGE, HOW MANY MINUTES DO YOU ENGAGE IN EXERCISE AT THIS LEVEL?: 90 MIN

## 2024-10-07 SDOH — HEALTH STABILITY: PHYSICAL HEALTH: ON AVERAGE, HOW MANY DAYS PER WEEK DO YOU ENGAGE IN MODERATE TO STRENUOUS EXERCISE (LIKE A BRISK WALK)?: 5 DAYS

## 2024-10-07 ASSESSMENT — SOCIAL DETERMINANTS OF HEALTH (SDOH): HOW OFTEN DO YOU GET TOGETHER WITH FRIENDS OR RELATIVES?: MORE THAN THREE TIMES A WEEK

## 2024-10-07 ASSESSMENT — PAIN SCALES - GENERAL: PAINLEVEL: NO PAIN (0)

## 2024-10-07 NOTE — PROGRESS NOTES
Preventive Care Visit  [facility]  SONU Ortiz CNP, Family Medicine  Oct 7, 2024      ICD-10-CM    1. Routine history and physical examination of adult  Z00.00       2. Incomplete immunization series  Z28.39 Hepatitis B core antibody     Hepatitis B surface antigen     Hepatitis B Surface Antibody     Hepatitis B core antibody     Hepatitis B surface antigen     Hepatitis B Surface Antibody            Subjective   Iris is a 39 year old, presenting for the following:  Physical        10/7/2024     3:54 PM   Additional Questions   Roomed by Tanna Pak         10/7/2024     3:54 PM   Patient Reported Additional Medications   Patient reports taking the following new medications b12, vitamin D, multivitamin        Health Care Directive  Patient does not have a Health Care Directive or Living Will: Discussed advance care planning with patient; however, patient declined at this time.    HPI        10/7/2024   General Health   How would you rate your overall physical health? Good   Feel stress (tense, anxious, or unable to sleep) Not at all            10/7/2024   Nutrition   Three or more servings of calcium each day? Yes   Diet: Regular (no restrictions)   How many servings of fruit and vegetables per day? (!) 2-3   How many sweetened beverages each day? 0-1            10/7/2024   Exercise   Days per week of moderate/strenous exercise 5 days   Average minutes spent exercising at this level 90 min            10/7/2024   Social Factors   Frequency of gathering with friends or relatives More than three times a week   Worry food won't last until get money to buy more No   Food not last or not have enough money for food? No   Do you have housing? (Housing is defined as stable permanent housing and does not include staying ouside in a car, in a tent, in an abandoned building, in an overnight shelter, or couch-surfing.) Yes   Are you worried about losing your housing? No    Lack of transportation? No   Unable to get utilities (heat,electricity)? No            10/7/2024   Dental   Dentist two times every year? Yes            10/7/2024   TB Screening   Were you born outside of the [place]? Yes            Today's PHQ-2 Score:       10/7/2024     3:47 PM   PHQ-2 ( 1999 [employer])   Q1: Little interest or pleasure in doing things 0   Q2: Feeling down, depressed or hopeless 0   PHQ-2 Score 0   Q1: Little interest or pleasure in doing things Not at all   Q2: Feeling down, depressed or hopeless Not at all   PHQ-2 Score 0           10/7/2024   Substance Use   Alcohol more than 3/day or more than 7/wk No   Do you use any other substances recreationally? No        Social History     Tobacco Use    Smoking status: Never    Smokeless tobacco: Never   Vaping Use    Vaping status: Never Used   Substance Use Topics    Alcohol use: Yes     Comment: 1-2x/year    Drug use: No          Mammogram Screening - Patient under 40 years of age: Routine Mammogram Screening not recommended.         10/7/2024   STI Screening   New sexual partner(s) since last STI/HIV test? No        History of abnormal Pap smear: No - age 30-64 HPV with reflex Pap every 5 years recommended        Latest Ref Rng & Units 9/7/2023    12:03 PM 8/3/2018     4:30 PM 8/3/2018     2:50 PM   PAP / HPV   PAP  Negative for Intraepithelial Lesion or Malignancy (NILM)      PAP (Historical)   NIL     HPV 16 DNA Negative Negative   Negative    HPV 18 DNA Negative Negative   Negative    Other HR HPV Negative Negative   Negative            10/7/2024   Contraception/Family Planning   Questions about contraception or family planning No           Reviewed and updated as needed this visit by Provider                    Lab work is in process  Labs reviewed in EPIC      Review of Systems  Constitutional, HEENT, cardiovascular, pulmonary, GI, , musculoskeletal, neuro, skin, endocrine and psych systems are negative, except as otherwise noted.     Objective   "  Exam  /81   Pulse 65   Temp 97.7  F (36.5  C) (Temporal)   Resp 14   Ht 1.635 m (5' 4.37\")   Wt 83 kg (183 lb)   LMP  (LMP Unknown)   SpO2 100%   BMI 31.05 kg/m     Estimated body mass index is 31.05 kg/m  as calculated from the following:    Height as of this encounter: 1.635 m (5' 4.37\").    Weight as of this encounter: 83 kg (183 lb).    Physical Exam  GENERAL: alert and no distress  EYES: Eyes grossly normal to inspection, PERRL and conjunctivae and sclerae normal  HENT: ear canals and TM's normal, nose and mouth without ulcers or lesions  NECK: no adenopathy, no asymmetry, masses, or scars  RESP: lungs clear to auscultation - no rales, rhonchi or wheezes  CV: regular rate and rhythm, normal S1 S2, no S3 or S4, no murmur, click or rub, no peripheral edema  ABDOMEN: soft, nontender, no hepatosplenomegaly, no masses and bowel sounds normal  MS: no gross musculoskeletal defects noted, no edema  PSYCH: mentation appears normal, affect normal/bright        Signed Electronically by: SONU Ortiz CNP    "

## 2024-10-08 LAB
HBV CORE AB SERPL QL IA: NONREACTIVE
HBV SURFACE AB SERPL IA-ACNC: <3.5 M[IU]/ML
HBV SURFACE AB SERPL IA-ACNC: NONREACTIVE M[IU]/ML
HBV SURFACE AG SERPL QL IA: NONREACTIVE

## 2024-10-09 DIAGNOSIS — Z23 NEED FOR VIRAL IMMUNIZATION: Primary | ICD-10-CM

## 2025-06-30 ENCOUNTER — MYC MEDICAL ADVICE (OUTPATIENT)
Dept: FAMILY MEDICINE | Facility: CLINIC | Age: 40
End: 2025-06-30
Payer: COMMERCIAL

## 2025-07-03 ENCOUNTER — OFFICE VISIT (OUTPATIENT)
Dept: FAMILY MEDICINE | Facility: CLINIC | Age: 40
End: 2025-07-03
Payer: COMMERCIAL

## 2025-07-03 VITALS
OXYGEN SATURATION: 100 % | HEIGHT: 64 IN | DIASTOLIC BLOOD PRESSURE: 77 MMHG | BODY MASS INDEX: 31.16 KG/M2 | HEART RATE: 60 BPM | TEMPERATURE: 98 F | SYSTOLIC BLOOD PRESSURE: 116 MMHG | WEIGHT: 182.5 LBS

## 2025-07-03 DIAGNOSIS — F41.8 SITUATIONAL ANXIETY: Primary | ICD-10-CM

## 2025-07-03 DIAGNOSIS — Z13.1 SCREENING FOR DIABETES MELLITUS: ICD-10-CM

## 2025-07-03 RX ORDER — PROPRANOLOL HYDROCHLORIDE 10 MG/1
10 TABLET ORAL 3 TIMES DAILY PRN
Qty: 60 TABLET | Refills: 1 | Status: SHIPPED | OUTPATIENT
Start: 2025-07-03

## 2025-07-03 ASSESSMENT — ANXIETY QUESTIONNAIRES
6. BECOMING EASILY ANNOYED OR IRRITABLE: NEARLY EVERY DAY
5. BEING SO RESTLESS THAT IT IS HARD TO SIT STILL: NOT AT ALL
GAD7 TOTAL SCORE: 16
IF YOU CHECKED OFF ANY PROBLEMS ON THIS QUESTIONNAIRE, HOW DIFFICULT HAVE THESE PROBLEMS MADE IT FOR YOU TO DO YOUR WORK, TAKE CARE OF THINGS AT HOME, OR GET ALONG WITH OTHER PEOPLE: SOMEWHAT DIFFICULT
4. TROUBLE RELAXING: NEARLY EVERY DAY
1. FEELING NERVOUS, ANXIOUS, OR ON EDGE: NEARLY EVERY DAY
7. FEELING AFRAID AS IF SOMETHING AWFUL MIGHT HAPPEN: SEVERAL DAYS
8. IF YOU CHECKED OFF ANY PROBLEMS, HOW DIFFICULT HAVE THESE MADE IT FOR YOU TO DO YOUR WORK, TAKE CARE OF THINGS AT HOME, OR GET ALONG WITH OTHER PEOPLE?: SOMEWHAT DIFFICULT
7. FEELING AFRAID AS IF SOMETHING AWFUL MIGHT HAPPEN: SEVERAL DAYS
GAD7 TOTAL SCORE: 16
GAD7 TOTAL SCORE: 16
2. NOT BEING ABLE TO STOP OR CONTROL WORRYING: NEARLY EVERY DAY
3. WORRYING TOO MUCH ABOUT DIFFERENT THINGS: NEARLY EVERY DAY

## 2025-07-03 ASSESSMENT — PAIN SCALES - GENERAL: PAINLEVEL_OUTOF10: NO PAIN (0)

## 2025-07-03 ASSESSMENT — PATIENT HEALTH QUESTIONNAIRE - PHQ9
10. IF YOU CHECKED OFF ANY PROBLEMS, HOW DIFFICULT HAVE THESE PROBLEMS MADE IT FOR YOU TO DO YOUR WORK, TAKE CARE OF THINGS AT HOME, OR GET ALONG WITH OTHER PEOPLE: NOT DIFFICULT AT ALL
SUM OF ALL RESPONSES TO PHQ QUESTIONS 1-9: 18
SUM OF ALL RESPONSES TO PHQ QUESTIONS 1-9: 18

## 2025-07-03 ASSESSMENT — ENCOUNTER SYMPTOMS: NERVOUS/ANXIOUS: 1

## 2025-07-03 NOTE — PROGRESS NOTES
ICD-10-CM    1. Situational anxiety  F41.8 propranolol (INDERAL) 10 MG tablet     Adult Mental Health  Referral      2. Screening for diabetes mellitus  Z13.1         -discussed counseling referral to [facility]  Propranolol PRN anxiety  Follow up one month  The longitudinal plan of care for the diagnosis(es)/condition(s) as documented were addressed during this visit. Due to the added complexity in care, I will continue to support Iris in the subsequent management and with ongoing continuity of care.     Subjective   Iris is a 40 year old, presenting for the following health issues:  Anxiety      7/3/2025     8:23 AM   Additional Questions   Roomed by Ravi REVELES     Anxiety    History of Present Illness       Mental Health Follow-up:  Patient presents to follow-up on Depression & Anxiety.Patient's depression since last visit has been:  Bad  The patient is not having other symptoms associated with depression.  Patient's anxiety since last visit has been:  Bad  The patient is not having other symptoms associated with anxiety.  Any significant life events: relationship concerns and housing concerns  Patient is not feeling anxious or having panic attacks.  Patient has no concerns about alcohol or drug use.    She eats 2-3 servings of fruits and vegetables daily.She consumes 1 sweetened beverage(s) daily.She exercises with enough effort to increase her heart rate 9 or less minutes per day.  She exercises with enough effort to increase her heart rate 3 or less days per week.   She is taking medications regularly.      She has been feeling very low and having panic attacks for the past few months. Last year she and her  , but they are still living together with their three children. They are no longer arguing, but it is a stressful living situation. She has noticed trouble sleeping-waking up frequently during the night- and low appetite. She has noticed panic symptoms especially in the  "morning before she goes to work- feeling short of breath and tight in her chest. She went through Post-partum depression after the birth of her second daughter, but did not take any medications or have therapy.          Review of Systems  Constitutional, HEENT, cardiovascular, pulmonary, gi and gu systems are negative, except as otherwise noted.      Objective    /77 (BP Location: Right arm, Patient Position: Sitting, Cuff Size: Adult Regular)   Pulse 60   Temp 98  F (36.7  C) (Temporal)   Ht 1.637 m (5' 4.45\")   Wt 82.8 kg (182 lb 8 oz)   LMP  (LMP Unknown)   SpO2 100%   BMI 30.89 kg/m    Body mass index is 30.89 kg/m .  Physical Exam   GENERAL: alert and no distress  EYES: Eyes grossly normal to inspection, PERRL and conjunctivae and sclerae normal  NECK: no adenopathy, no asymmetry, masses, or scars  RESP: lungs clear to auscultation - no rales, rhonchi or wheezes  CV: regular rate and rhythm, normal S1 S2, no S3 or S4, no murmur, click or rub, no peripheral edema  PSYCH: mentation appears normal, affect normal/bright    Office Visit on 10/07/2024   Component Date Value Ref Range Status    Hepatitis B Core Antibody Total 10/07/2024 Nonreactive  Nonreactive Final    Nonreactive hepatitis B core antibody test results indicate the absence of exposure to hepatitis B virus and no evidence of recent, past/resolved, or chronic hepatitis B.     Hepatitis B Surface Antigen 10/07/2024 Nonreactive  Nonreactive Final    Hepatitis B Surface Antibody 10/07/2024 Nonreactive   Final    Nonreactive results, defined as anti-HBs levels of less than 8.5 mIU/mL, indicate a lack of recovery from acute or chronic hepatitis B or inadequate immune response to HBV vaccination.    Hepatitis B Surface Antibody Instr* 10/07/2024 <3.50  <8.5 m[IU]/mL Final           Signed Electronically by: SONU Ortiz CNP    "

## 2025-08-07 ENCOUNTER — HOSPITAL ENCOUNTER (EMERGENCY)
Facility: CLINIC | Age: 40
Discharge: HOME | End: 2025-08-07
Attending: EMERGENCY MEDICINE | Admitting: EMERGENCY MEDICINE
Payer: COMMERCIAL

## 2025-08-07 VITALS
OXYGEN SATURATION: 97 % | TEMPERATURE: 98.4 F | RESPIRATION RATE: 16 BRPM | HEART RATE: 56 BPM | DIASTOLIC BLOOD PRESSURE: 66 MMHG | SYSTOLIC BLOOD PRESSURE: 105 MMHG

## 2025-08-07 DIAGNOSIS — S82.832D CLOSED FRACTURE OF PROXIMAL END OF LEFT FIBULA WITH ROUTINE HEALING, UNSPECIFIED FRACTURE MORPHOLOGY, SUBSEQUENT ENCOUNTER: ICD-10-CM

## 2025-08-07 DIAGNOSIS — S83.421D SPRAIN OF LATERAL COLLATERAL LIGAMENT OF RIGHT KNEE, SUBSEQUENT ENCOUNTER: Primary | ICD-10-CM

## 2025-08-07 PROCEDURE — 99283 EMERGENCY DEPT VISIT LOW MDM: CPT | Mod: GC | Performed by: EMERGENCY MEDICINE

## 2025-08-07 PROCEDURE — 99283 EMERGENCY DEPT VISIT LOW MDM: CPT | Performed by: EMERGENCY MEDICINE

## 2025-08-07 ASSESSMENT — COLUMBIA-SUICIDE SEVERITY RATING SCALE - C-SSRS
6. HAVE YOU EVER DONE ANYTHING, STARTED TO DO ANYTHING, OR PREPARED TO DO ANYTHING TO END YOUR LIFE?: NO
2. HAVE YOU ACTUALLY HAD ANY THOUGHTS OF KILLING YOURSELF IN THE PAST MONTH?: NO
1. IN THE PAST MONTH, HAVE YOU WISHED YOU WERE DEAD OR WISHED YOU COULD GO TO SLEEP AND NOT WAKE UP?: NO

## 2025-08-07 ASSESSMENT — ACTIVITIES OF DAILY LIVING (ADL)
ADLS_ACUITY_SCORE: 48

## 2025-08-11 ENCOUNTER — TELEPHONE (OUTPATIENT)
Dept: ORTHOPEDICS | Facility: CLINIC | Age: 40
End: 2025-08-11
Payer: COMMERCIAL

## 2025-08-18 ENCOUNTER — VIRTUAL VISIT (OUTPATIENT)
Dept: BEHAVIORAL HEALTH | Facility: CLINIC | Age: 40
End: 2025-08-18
Attending: NURSE PRACTITIONER
Payer: COMMERCIAL

## 2025-08-18 DIAGNOSIS — F43.23 ADJUSTMENT DISORDER WITH MIXED ANXIETY AND DEPRESSED MOOD: Primary | ICD-10-CM

## 2025-08-18 PROCEDURE — 90832 PSYTX W PT 30 MINUTES: CPT | Mod: 95 | Performed by: COUNSELOR

## 2025-08-18 ASSESSMENT — PATIENT HEALTH QUESTIONNAIRE - PHQ9
10. IF YOU CHECKED OFF ANY PROBLEMS, HOW DIFFICULT HAVE THESE PROBLEMS MADE IT FOR YOU TO DO YOUR WORK, TAKE CARE OF THINGS AT HOME, OR GET ALONG WITH OTHER PEOPLE: NOT DIFFICULT AT ALL
SUM OF ALL RESPONSES TO PHQ QUESTIONS 1-9: 5
SUM OF ALL RESPONSES TO PHQ QUESTIONS 1-9: 5

## 2025-08-18 ASSESSMENT — COLUMBIA-SUICIDE SEVERITY RATING SCALE - C-SSRS
1. SINCE LAST CONTACT, HAVE YOU WISHED YOU WERE DEAD OR WISHED YOU COULD GO TO SLEEP AND NOT WAKE UP?: NO
SUICIDE, SINCE LAST CONTACT: NO
2. HAVE YOU ACTUALLY HAD ANY THOUGHTS OF KILLING YOURSELF?: NO
6. HAVE YOU EVER DONE ANYTHING, STARTED TO DO ANYTHING, OR PREPARED TO DO ANYTHING TO END YOUR LIFE?: NO
TOTAL  NUMBER OF INTERRUPTED ATTEMPTS SINCE LAST CONTACT: NO
ATTEMPT SINCE LAST CONTACT: NO

## 2025-08-20 ENCOUNTER — ANCILLARY PROCEDURE (OUTPATIENT)
Dept: GENERAL RADIOLOGY | Facility: CLINIC | Age: 40
End: 2025-08-20
Attending: FAMILY MEDICINE
Payer: COMMERCIAL

## 2025-08-20 ENCOUNTER — OFFICE VISIT (OUTPATIENT)
Dept: ORTHOPEDICS | Facility: CLINIC | Age: 40
End: 2025-08-20
Payer: COMMERCIAL

## 2025-08-20 DIAGNOSIS — S82.832A CLOSED FRACTURE OF PROXIMAL END OF LEFT FIBULA, UNSPECIFIED FRACTURE MORPHOLOGY, INITIAL ENCOUNTER: ICD-10-CM

## 2025-08-20 DIAGNOSIS — S89.91XA RIGHT KNEE INJURY, INITIAL ENCOUNTER: Primary | ICD-10-CM

## 2025-08-20 DIAGNOSIS — M25.361 KNEE INSTABILITY, RIGHT: ICD-10-CM

## 2025-08-20 DIAGNOSIS — S87.82XA CRUSHING INJURY OF LEFT LOWER LEG, INITIAL ENCOUNTER: Primary | ICD-10-CM

## 2025-08-20 DIAGNOSIS — S87.82XA CRUSHING INJURY OF LEFT LOWER LEG, INITIAL ENCOUNTER: ICD-10-CM

## 2025-08-20 DIAGNOSIS — M25.461 EFFUSION OF RIGHT KNEE: ICD-10-CM

## 2025-08-20 PROCEDURE — 73590 X-RAY EXAM OF LOWER LEG: CPT | Mod: LT | Performed by: RADIOLOGY

## 2025-08-20 PROCEDURE — 99203 OFFICE O/P NEW LOW 30 MIN: CPT | Performed by: FAMILY MEDICINE

## 2025-08-20 SDOH — HEALTH STABILITY: PHYSICAL HEALTH: ON AVERAGE, HOW MANY DAYS PER WEEK DO YOU ENGAGE IN MODERATE TO STRENUOUS EXERCISE (LIKE A BRISK WALK)?: 0 DAYS

## 2025-08-20 SDOH — HEALTH STABILITY: PHYSICAL HEALTH: ON AVERAGE, HOW MANY MINUTES DO YOU ENGAGE IN EXERCISE AT THIS LEVEL?: 0 MIN

## 2025-09-02 ENCOUNTER — VIRTUAL VISIT (OUTPATIENT)
Dept: BEHAVIORAL HEALTH | Facility: CLINIC | Age: 40
End: 2025-09-02
Payer: COMMERCIAL

## 2025-09-02 DIAGNOSIS — F43.23 ADJUSTMENT DISORDER WITH MIXED ANXIETY AND DEPRESSED MOOD: Primary | ICD-10-CM

## 2025-09-02 PROCEDURE — 90791 PSYCH DIAGNOSTIC EVALUATION: CPT | Mod: 52 | Performed by: COUNSELOR

## 2025-09-02 ASSESSMENT — COLUMBIA-SUICIDE SEVERITY RATING SCALE - C-SSRS
2. HAVE YOU ACTUALLY HAD ANY THOUGHTS OF KILLING YOURSELF?: NO
SUICIDE, SINCE LAST CONTACT: NO
1. SINCE LAST CONTACT, HAVE YOU WISHED YOU WERE DEAD OR WISHED YOU COULD GO TO SLEEP AND NOT WAKE UP?: NO
ATTEMPT SINCE LAST CONTACT: NO
TOTAL  NUMBER OF INTERRUPTED ATTEMPTS SINCE LAST CONTACT: NO
TOTAL  NUMBER OF ABORTED OR SELF INTERRUPTED ATTEMPTS SINCE LAST CONTACT: NO
6. HAVE YOU EVER DONE ANYTHING, STARTED TO DO ANYTHING, OR PREPARED TO DO ANYTHING TO END YOUR LIFE?: NO

## (undated) DEVICE — SOL WATER IRRIG 1000ML BOTTLE 2F7114

## (undated) DEVICE — DEVICE TISSUE REMOVAL HYSTEROSCOPIC MYOSURE LITE 30-401LITE

## (undated) DEVICE — GLOVE PROTEXIS W/NEU-THERA 6.5  2D73TE65

## (undated) DEVICE — LINEN TOWEL PACK X5 5464

## (undated) DEVICE — GLOVE PROTEXIS BLUE W/NEU-THERA 7.0  2D73EB70

## (undated) DEVICE — SOL NACL 0.9% IRRIG 1000ML BOTTLE 2F7124

## (undated) DEVICE — PAD CHUX UNDERPAD 30X36" P3036C

## (undated) DEVICE — KIT PROCEDURE FLUENT IN/OUT FLOWPAK TISS TRAP FLT-112S

## (undated) DEVICE — Device

## (undated) DEVICE — LINEN GOWN X4 5410

## (undated) DEVICE — SOL NACL 0.9% IRRIG 3000ML BAG 2B7477

## (undated) DEVICE — SEAL SET MYOSURE ROD LENS SCOPE SINGLE USE 40-902

## (undated) RX ORDER — LIDOCAINE HYDROCHLORIDE 20 MG/ML
INJECTION, SOLUTION EPIDURAL; INFILTRATION; INTRACAUDAL; PERINEURAL
Status: DISPENSED
Start: 2021-07-13

## (undated) RX ORDER — LIDOCAINE HYDROCHLORIDE 10 MG/ML
INJECTION, SOLUTION EPIDURAL; INFILTRATION; INTRACAUDAL; PERINEURAL
Status: DISPENSED
Start: 2021-07-13

## (undated) RX ORDER — KETOROLAC TROMETHAMINE 30 MG/ML
INJECTION, SOLUTION INTRAMUSCULAR; INTRAVENOUS
Status: DISPENSED
Start: 2021-07-13

## (undated) RX ORDER — FENTANYL CITRATE 50 UG/ML
INJECTION, SOLUTION INTRAMUSCULAR; INTRAVENOUS
Status: DISPENSED
Start: 2021-07-13

## (undated) RX ORDER — FENTANYL CITRATE-0.9 % NACL/PF 10 MCG/ML
PLASTIC BAG, INJECTION (ML) INTRAVENOUS
Status: DISPENSED
Start: 2021-07-13

## (undated) RX ORDER — ONDANSETRON 2 MG/ML
INJECTION INTRAMUSCULAR; INTRAVENOUS
Status: DISPENSED
Start: 2021-07-13

## (undated) RX ORDER — ACETAMINOPHEN 325 MG/1
TABLET ORAL
Status: DISPENSED
Start: 2021-07-13

## (undated) RX ORDER — DEXAMETHASONE SODIUM PHOSPHATE 4 MG/ML
INJECTION, SOLUTION INTRA-ARTICULAR; INTRALESIONAL; INTRAMUSCULAR; INTRAVENOUS; SOFT TISSUE
Status: DISPENSED
Start: 2021-07-13